# Patient Record
Sex: MALE | Race: WHITE | NOT HISPANIC OR LATINO | Employment: OTHER | URBAN - METROPOLITAN AREA
[De-identification: names, ages, dates, MRNs, and addresses within clinical notes are randomized per-mention and may not be internally consistent; named-entity substitution may affect disease eponyms.]

---

## 2017-12-19 ENCOUNTER — HOSPITAL ENCOUNTER (OUTPATIENT)
Dept: CARDIOLOGY | Facility: HOSPITAL | Age: 72
Discharge: HOME | End: 2017-12-19
Attending: INTERNAL MEDICINE | Admitting: INTERNAL MEDICINE

## 2019-02-12 ENCOUNTER — ON CAMPUS - OUTPATIENT (OUTPATIENT)
Dept: URBAN - METROPOLITAN AREA HOSPITAL 77 | Facility: HOSPITAL | Age: 74
End: 2019-02-12
Payer: MEDICARE

## 2019-02-12 DIAGNOSIS — R19.5 OTHER FECAL ABNORMALITIES: ICD-10-CM

## 2019-02-12 DIAGNOSIS — D12.5 BENIGN NEOPLASM OF SIGMOID COLON: ICD-10-CM

## 2019-02-12 DIAGNOSIS — D12.3 BENIGN NEOPLASM OF TRANSVERSE COLON: ICD-10-CM

## 2019-02-12 DIAGNOSIS — D12.4 BENIGN NEOPLASM OF DESCENDING COLON: ICD-10-CM

## 2019-02-12 DIAGNOSIS — K55.21 ANGIODYSPLASIA OF COLON WITH HEMORRHAGE: ICD-10-CM

## 2019-02-12 DIAGNOSIS — K62.1 RECTAL POLYP: ICD-10-CM

## 2019-02-12 PROCEDURE — 45382 COLONOSCOPY W/CONTROL BLEED: CPT | Performed by: INTERNAL MEDICINE

## 2019-02-12 PROCEDURE — 45385 COLONOSCOPY W/LESION REMOVAL: CPT | Mod: 59 | Performed by: INTERNAL MEDICINE

## 2019-06-13 ENCOUNTER — HOSPITAL ENCOUNTER (OUTPATIENT)
Dept: CARDIOLOGY | Facility: HOSPITAL | Age: 74
Discharge: HOME | End: 2019-06-13
Attending: NURSE PRACTITIONER | Admitting: NURSE PRACTITIONER

## 2019-06-26 ENCOUNTER — TRANSCRIBE ORDERS (OUTPATIENT)
Dept: CARDIOLOGY | Facility: CLINIC | Age: 74
End: 2019-06-26

## 2019-06-26 DIAGNOSIS — I73.9 PVD (PERIPHERAL VASCULAR DISEASE) (HCC): ICD-10-CM

## 2019-06-26 DIAGNOSIS — I35.0 AORTIC STENOSIS, MODERATE: ICD-10-CM

## 2019-06-26 DIAGNOSIS — I73.9 CLAUDICATION (HCC): Primary | ICD-10-CM

## 2019-06-26 DIAGNOSIS — R01.1 MURMUR: ICD-10-CM

## 2019-06-26 DIAGNOSIS — E78.5 HYPERLIPIDEMIA, UNSPECIFIED HYPERLIPIDEMIA TYPE: ICD-10-CM

## 2019-07-08 ENCOUNTER — HOSPITAL ENCOUNTER (OUTPATIENT)
Dept: CARDIOLOGY | Facility: HOSPITAL | Age: 74
Discharge: HOME | End: 2019-07-08
Admitting: NURSE PRACTITIONER

## 2019-07-08 VITALS
WEIGHT: 145 LBS | SYSTOLIC BLOOD PRESSURE: 154 MMHG | HEART RATE: 63 BPM | BODY MASS INDEX: 20.76 KG/M2 | DIASTOLIC BLOOD PRESSURE: 72 MMHG | HEIGHT: 70 IN

## 2019-07-08 DIAGNOSIS — I73.9 CLAUDICATION (HCC): ICD-10-CM

## 2019-07-08 DIAGNOSIS — I35.0 AORTIC STENOSIS, MODERATE: ICD-10-CM

## 2019-07-08 DIAGNOSIS — I73.9 PVD (PERIPHERAL VASCULAR DISEASE) (HCC): ICD-10-CM

## 2019-07-08 DIAGNOSIS — R01.1 MURMUR: ICD-10-CM

## 2019-07-08 DIAGNOSIS — E78.5 HYPERLIPIDEMIA, UNSPECIFIED HYPERLIPIDEMIA TYPE: ICD-10-CM

## 2019-07-08 LAB
BH CV ECHO MEAS - ACS: 0.7 CM
BH CV ECHO MEAS - AO MAX PG (FULL): 39.1 MMHG
BH CV ECHO MEAS - AO MAX PG: 41.1 MMHG
BH CV ECHO MEAS - AO MEAN PG (FULL): 22 MMHG
BH CV ECHO MEAS - AO MEAN PG: 23.1 MMHG
BH CV ECHO MEAS - AO ROOT AREA (BSA CORRECTED): 1.7
BH CV ECHO MEAS - AO ROOT AREA: 7.7 CM^2
BH CV ECHO MEAS - AO ROOT DIAM: 3.1 CM
BH CV ECHO MEAS - AO V2 MAX: 320.4 CM/SEC
BH CV ECHO MEAS - AO V2 MEAN: 227.7 CM/SEC
BH CV ECHO MEAS - AO V2 VTI: 76.6 CM
BH CV ECHO MEAS - ASC AORTA: 3.5 CM
BH CV ECHO MEAS - AVA(I,A): 0.88 CM^2
BH CV ECHO MEAS - AVA(I,D): 0.88 CM^2
BH CV ECHO MEAS - AVA(V,A): 0.92 CM^2
BH CV ECHO MEAS - AVA(V,D): 0.92 CM^2
BH CV ECHO MEAS - BSA(HAYCOCK): 1.8 M^2
BH CV ECHO MEAS - BSA: 1.8 M^2
BH CV ECHO MEAS - BZI_BMI: 20.4 KILOGRAMS/M^2
BH CV ECHO MEAS - BZI_METRIC_HEIGHT: 177.8 CM
BH CV ECHO MEAS - BZI_METRIC_WEIGHT: 64.4 KG
BH CV ECHO MEAS - EDV(CUBED): 123.4 ML
BH CV ECHO MEAS - EDV(MOD-SP4): 106.9 ML
BH CV ECHO MEAS - EDV(TEICH): 117.1 ML
BH CV ECHO MEAS - EF(CUBED): 60.5 %
BH CV ECHO MEAS - EF(MOD-SP4): 52.2 %
BH CV ECHO MEAS - EF(TEICH): 51.9 %
BH CV ECHO MEAS - ESV(CUBED): 48.7 ML
BH CV ECHO MEAS - ESV(MOD-SP4): 51.1 ML
BH CV ECHO MEAS - ESV(TEICH): 56.3 ML
BH CV ECHO MEAS - FS: 26.6 %
BH CV ECHO MEAS - IVS/LVPW: 1.6
BH CV ECHO MEAS - IVSD: 1.4 CM
BH CV ECHO MEAS - LA DIMENSION(2D): 3.9 CM
BH CV ECHO MEAS - LA DIMENSION: 3.9 CM
BH CV ECHO MEAS - LA/AO: 1.3
BH CV ECHO MEAS - LAT PEAK E' VEL: 7 CM/SEC
BH CV ECHO MEAS - LV DIASTOLIC VOL/BSA (35-75): 59.2 ML/M^2
BH CV ECHO MEAS - LV IVRT: 0.06 SEC
BH CV ECHO MEAS - LV MASS(C)D: 208.3 GRAMS
BH CV ECHO MEAS - LV MASS(C)DI: 115.4 GRAMS/M^2
BH CV ECHO MEAS - LV MAX PG: 2 MMHG
BH CV ECHO MEAS - LV MEAN PG: 1.1 MMHG
BH CV ECHO MEAS - LV SYSTOLIC VOL/BSA (12-30): 28.3 ML/M^2
BH CV ECHO MEAS - LV V1 MAX: 70.8 CM/SEC
BH CV ECHO MEAS - LV V1 MEAN: 49 CM/SEC
BH CV ECHO MEAS - LV V1 VTI: 16.2 CM
BH CV ECHO MEAS - LVIDD: 5 CM
BH CV ECHO MEAS - LVIDS: 3.7 CM
BH CV ECHO MEAS - LVOT AREA: 4.2 CM^2
BH CV ECHO MEAS - LVOT DIAM: 2.3 CM
BH CV ECHO MEAS - LVPWD: 0.87 CM
BH CV ECHO MEAS - MED PEAK E' VEL: 4 CM/SEC
BH CV ECHO MEAS - MV A MAX VEL: 114.5 CM/SEC
BH CV ECHO MEAS - MV DEC SLOPE: 676.9 CM/SEC^2
BH CV ECHO MEAS - MV DEC TIME: 0.17 SEC
BH CV ECHO MEAS - MV E MAX VEL: 115 CM/SEC
BH CV ECHO MEAS - MV E/A: 1
BH CV ECHO MEAS - MV MAX PG: 6.1 MMHG
BH CV ECHO MEAS - MV MEAN PG: 2.6 MMHG
BH CV ECHO MEAS - MV P1/2T: 58 MSEC
BH CV ECHO MEAS - MV V2 MAX: 123.7 CM/SEC
BH CV ECHO MEAS - MV V2 MEAN: 75.3 CM/SEC
BH CV ECHO MEAS - MV V2 VTI: 33.9 CM
BH CV ECHO MEAS - MVA(P1/2T): 3.8 CM2
BH CV ECHO MEAS - MVA(VTI): 2 CM^2
BH CV ECHO MEAS - PA ACC TIME: 0.15 SEC
BH CV ECHO MEAS - PA MAX PG (FULL): 0.54 MMHG
BH CV ECHO MEAS - PA MAX PG: 3.4 MMHG
BH CV ECHO MEAS - PA MEAN PG (FULL): 0.27 MMHG
BH CV ECHO MEAS - PA MEAN PG: 1.9 MMHG
BH CV ECHO MEAS - PA PR(ACCEL): 12.6 MMHG
BH CV ECHO MEAS - PA V2 MAX: 91.8 CM/SEC
BH CV ECHO MEAS - PA V2 MEAN: 64.6 CM/SEC
BH CV ECHO MEAS - PA V2 VTI: 20.6 CM
BH CV ECHO MEAS - PAPD(PI EDV): 16.5 MMHG
BH CV ECHO MEAS - PI END-D VEL: 145.8 CM/SEC
BH CV ECHO MEAS - PI MAX PG: 15 MMHG
BH CV ECHO MEAS - PI MAX VEL: 193.4 CM/SEC
BH CV ECHO MEAS - PULM A REVS DUR: 0.08 SEC
BH CV ECHO MEAS - PULM A REVS VEL: 30.5 CM/SEC
BH CV ECHO MEAS - PULM DIAS VEL: 53.8 CM/SEC
BH CV ECHO MEAS - PULM S/D: 1.1
BH CV ECHO MEAS - PULM SYS VEL: 59.8 CM/SEC
BH CV ECHO MEAS - RAP SYSTOLE: 8 MMHG
BH CV ECHO MEAS - RV MAX PG: 2.8 MMHG
BH CV ECHO MEAS - RV MEAN PG: 1.6 MMHG
BH CV ECHO MEAS - RV V1 MAX: 84.1 CM/SEC
BH CV ECHO MEAS - RV V1 MEAN: 60.1 CM/SEC
BH CV ECHO MEAS - RV V1 VTI: 19.4 CM
BH CV ECHO MEAS - RVSP: 49.8 MMHG
BH CV ECHO MEAS - SI(AO): 327.5 ML/M^2
BH CV ECHO MEAS - SI(CUBED): 41.4 ML/M^2
BH CV ECHO MEAS - SI(LVOT): 37.4 ML/M^2
BH CV ECHO MEAS - SI(MOD-SP4): 30.9 ML/M^2
BH CV ECHO MEAS - SI(TEICH): 33.6 ML/M^2
BH CV ECHO MEAS - SV(AO): 591 ML
BH CV ECHO MEAS - SV(CUBED): 74.7 ML
BH CV ECHO MEAS - SV(LVOT): 67.6 ML
BH CV ECHO MEAS - SV(MOD-SP4): 55.8 ML
BH CV ECHO MEAS - SV(TEICH): 60.7 ML
BH CV ECHO MEAS - TAPSE (>1.6): 2.9 CM2
BH CV ECHO MEAS - TR MAX VEL: 323.3 CM/SEC
BH CV ECHO MEASUREMENTS AVERAGE E/E' RATIO: 20.91
BH CV XLRA - RV BASE: 4 CM
BH CV XLRA - RV MID: 2.6 CM
BH CV XLRA - TDI S': 16 CM/SEC
IVRT: 55 MSEC
LEFT ATRIUM VOLUME INDEX: 42 ML/M2
LEFT ATRIUM VOLUME: 74 CM3
LV EF 2D ECHO EST: 65 %
MAXIMAL PREDICTED HEART RATE: 149 BPM
SINUS: 3 CM
STRESS TARGET HR: 127 BPM

## 2019-07-08 PROCEDURE — 93306 TTE W/DOPPLER COMPLETE: CPT

## 2019-07-08 PROCEDURE — 93306 TTE W/DOPPLER COMPLETE: CPT | Performed by: INTERNAL MEDICINE

## 2019-07-09 ENCOUNTER — OFFICE VISIT (OUTPATIENT)
Dept: CARDIOLOGY | Facility: CLINIC | Age: 74
End: 2019-07-09

## 2019-07-09 VITALS
OXYGEN SATURATION: 99 % | HEART RATE: 54 BPM | WEIGHT: 136 LBS | HEIGHT: 67 IN | BODY MASS INDEX: 21.35 KG/M2 | DIASTOLIC BLOOD PRESSURE: 74 MMHG | SYSTOLIC BLOOD PRESSURE: 134 MMHG

## 2019-07-09 DIAGNOSIS — R68.89 ABNORMAL ANKLE BRACHIAL INDEX (ABI): ICD-10-CM

## 2019-07-09 DIAGNOSIS — I35.0 AORTIC STENOSIS, MODERATE: Primary | ICD-10-CM

## 2019-07-09 DIAGNOSIS — I73.9 PVD (PERIPHERAL VASCULAR DISEASE) WITH CLAUDICATION (HCC): ICD-10-CM

## 2019-07-09 PROCEDURE — 99213 OFFICE O/P EST LOW 20 MIN: CPT | Performed by: NURSE PRACTITIONER

## 2019-07-09 RX ORDER — ELECTROLYTES/DEXTROSE
1 SOLUTION, ORAL ORAL DAILY
COMMUNITY

## 2019-07-09 RX ORDER — ASPIRIN 81 MG/1
81 TABLET ORAL
COMMUNITY
Start: 2017-03-08 | End: 2023-01-01

## 2019-07-09 RX ORDER — CLOPIDOGREL BISULFATE 75 MG/1
75 TABLET ORAL DAILY
COMMUNITY
Start: 2017-04-13

## 2019-07-09 RX ORDER — AMMONIUM LACTATE 12 G/100G
CREAM TOPICAL
COMMUNITY
End: 2023-01-01

## 2019-07-09 RX ORDER — ATORVASTATIN CALCIUM 40 MG/1
40 TABLET, FILM COATED ORAL NIGHTLY
COMMUNITY
Start: 2017-03-08

## 2019-07-09 RX ORDER — LISINOPRIL AND HYDROCHLOROTHIAZIDE 25; 20 MG/1; MG/1
1 TABLET ORAL DAILY
COMMUNITY
Start: 2017-03-08 | End: 2023-01-01

## 2019-07-09 NOTE — PROGRESS NOTES
Reason for follow-up: Aortic stenosis/claudication    Dear Dr Desmond NUNES had the pleasure of seeing Holger in followup for peripheral arterial disease.  This is a pleasant 73-year-old  male with no known history of ischemic.  he does have a history of peripheral vascular disease.  He is status post PTA of his right popliteal artery in March of 2017.   He presents today for follow-up on the above conditions.     Mr. Ibrahim presented initially with complaints of right lower extremity discomfort, described as a throbbing sensation that resolves with rest.  His last echo performed 12/2017 showed normal LV function, EF 55 to 60%, diastolic dysfunction and moderate to severe aortic stenosis with valve area 1.2 cm², peak/mean gradient 58/32.  He underwent further evaluation consisting of 2D echocardiogram and ABIs.  He presents today in follow-up for these diagnostics.     2D echocardiogram showed normal LV function, EF 65%.  There was mild aortic insufficiency and moderate to severe aortic stenosis- YG was recommended for further evaluation.  ABIs showed normal left, right digit moderate-severe occlusion.  Patient continues to have claudication, but denies any chest pains, pressure, palpitations or shortness of air.  He is an avid walker and has had no difficulty from a respiratory standpoint.  Results of tests were discussed with the patient in detail today.  Questions were answered.    Impressions  1.  claudication  2. Peripheral arterial disease status post PTA right popliteal artery 03/2017  3. Diabetes mellitus.  4. Hypertension  5. Reformed tobacco abuser.  6. Aortic stenosis  7.  Abnormal ABIs    Plan  We will send patient for further vascular evaluation for his claudication/peripheral vascular disease.  We will also schedule patient for YG to further evaluate aortic valve.  Continue low-dose aspirin daily.  Follow-up after testing.        Chief Complaint   Patient presents with   • Leg Pain      Followup test results            History of Present Illness     The following portions of the patient's history were reviewed and updated as appropriate: allergies, current medications, past family history, past medical history, past social history, past surgical history and problem list.    Review of Systems   Cardiovascular:        Claudication right lower extremity   All other systems reviewed and are negative.      Vitals:    07/09/19 1308   BP: 134/74   Pulse: 54   SpO2: 99%       Physical Exam   Constitutional: He is oriented to person, place, and time. He appears well-developed and well-nourished. No distress.   HENT:   Head: Normocephalic and atraumatic.   Eyes: Conjunctivae and EOM are normal. Pupils are equal, round, and reactive to light.   Neck: Neck supple. No JVD present.   No bruit   Cardiovascular: Normal rate and regular rhythm.   Murmur heard.  2/6 systolic murmur heard to the right sternal border   Pulmonary/Chest: Effort normal and breath sounds normal.   Musculoskeletal: Normal range of motion.   Ambulates freely without assistance   Neurological: He is alert and oriented to person, place, and time. No cranial nerve deficit.   Skin: Skin is warm and dry. No rash noted.   Psychiatric: He has a normal mood and affect. His behavior is normal. Thought content normal.       Past Medical History:   Diagnosis Date   • Alcoholism (CMS/[facility])     hx of   • DM2 (diabetes mellitus, type 2) (CMS/[facility])    • Hyperlipidemia    • Hypertension    • PVD (peripheral vascular disease) (CMS/[facility])     PTA 2017       Past Surgical History:   Procedure Laterality Date   • APPENDECTOMY     • EYE SURGERY Right    • HERNIA REPAIR     • SPLENECTOMY      partial spleen removal         Current Outpatient Medications:   •  aspirin (ASPIR-LOW) 81 MG EC tablet, Take 81 mg by mouth., Disp: , Rfl:   •  atorvastatin (LIPITOR) 40 MG tablet, Take 40 mg by mouth Daily., Disp: , Rfl:   •  clopidogrel (PLAVIX) 75 MG tablet, Take 75 mg by  "mouth Daily., Disp: , Rfl:   •  glucose blood (ONE TOUCH ULTRA TEST) test strip, TEST 2 TIMES DAILY AS INSTRUCTED, Disp: , Rfl:   •  Insulin Glargine (LANTUS SOLOSTAR) 100 UNIT/ML injection pen, Inject 30 Units under the skin into the appropriate area as directed Daily., Disp: , Rfl:   •  Insulin Pen Needle (BD ULTRA-FINE PEN NEEDLES) 29G X 12.7MM misc, ONE MISCELLANEOUS EVERY EVENING, Disp: , Rfl:   •  Insulin Syringe-Needle U-100 30G X 1/2\" 0.5 ML misc, USES TWICE A DAY AS DIRECTED. DX: 250.00, Disp: , Rfl:   •  lisinopril-hydrochlorothiazide (PRINZIDE,ZESTORETIC) 20-25 MG per tablet, Take 1 tablet by mouth Daily., Disp: , Rfl:   •  ammonium lactate (AMLACTIN) 12 % cream, by Intratympanic route., Disp: , Rfl:   •  Multiple Vitamins-Minerals (MULTIVITAMIN ADULT) tablet, Take 1 mg by mouth Daily., Disp: , Rfl:     Allergies   Allergen Reactions   • Contrast Dye Itching       Family History   Problem Relation Age of Onset   • Cancer Mother    • Hypertension Father    • Hypertension Brother        Social History     Tobacco Use   • Smoking status: Former Smoker     Types: Cigarettes   • Smokeless tobacco: Never Used   Substance Use Topics   • Alcohol use: No     Frequency: Never       Physical Exam   Constitutional: He is oriented to person, place, and time. He appears well-developed and well-nourished. No distress.   HENT:   Head: Normocephalic and atraumatic.   Eyes: Conjunctivae and EOM are normal. Pupils are equal, round, and reactive to light.   Neck: Neck supple. No JVD present.   No bruit   Cardiovascular: Normal rate and regular rhythm.   Murmur heard.  2/6 systolic murmur heard to the right sternal border   Pulmonary/Chest: Effort normal and breath sounds normal.   Musculoskeletal: Normal range of motion.   Ambulates freely without assistance   Neurological: He is alert and oriented to person, place, and time. No cranial nerve deficit.   Skin: Skin is warm and dry. No rash noted.   Psychiatric: He has a " normal mood and affect. His behavior is normal. Thought content normal.

## 2019-07-19 ENCOUNTER — APPOINTMENT (OUTPATIENT)
Dept: CARDIOLOGY | Facility: HOSPITAL | Age: 74
End: 2019-07-19

## 2019-07-30 ENCOUNTER — HOSPITAL ENCOUNTER (OUTPATIENT)
Dept: CARDIOLOGY | Facility: HOSPITAL | Age: 74
Discharge: HOME | End: 2019-07-30
Attending: INTERNAL MEDICINE | Admitting: INTERNAL MEDICINE

## 2019-07-30 ENCOUNTER — ANESTHESIA EVENT (OUTPATIENT)
Dept: CARDIOLOGY | Facility: HOSPITAL | Age: 74
End: 2019-07-30

## 2019-07-30 ENCOUNTER — ANESTHESIA (OUTPATIENT)
Dept: CARDIOLOGY | Facility: HOSPITAL | Age: 74
End: 2019-07-30

## 2019-07-30 VITALS
WEIGHT: 132.72 LBS | HEART RATE: 84 BPM | TEMPERATURE: 98.1 F | OXYGEN SATURATION: 99 % | DIASTOLIC BLOOD PRESSURE: 74 MMHG | BODY MASS INDEX: 19 KG/M2 | HEIGHT: 70 IN | RESPIRATION RATE: 16 BRPM | SYSTOLIC BLOOD PRESSURE: 163 MMHG

## 2019-07-30 VITALS — DIASTOLIC BLOOD PRESSURE: 56 MMHG | SYSTOLIC BLOOD PRESSURE: 123 MMHG | OXYGEN SATURATION: 100 %

## 2019-07-30 DIAGNOSIS — I35.0 AORTIC STENOSIS, MODERATE: ICD-10-CM

## 2019-07-30 PROCEDURE — 93312 ECHO TRANSESOPHAGEAL: CPT

## 2019-07-30 PROCEDURE — 93320 DOPPLER ECHO COMPLETE: CPT | Performed by: INTERNAL MEDICINE

## 2019-07-30 PROCEDURE — 93312 ECHO TRANSESOPHAGEAL: CPT | Performed by: INTERNAL MEDICINE

## 2019-07-30 PROCEDURE — 25010000002 PROPOFOL 10 MG/ML EMULSION: Performed by: ANESTHESIOLOGY

## 2019-07-30 PROCEDURE — 93325 DOPPLER ECHO COLOR FLOW MAPG: CPT

## 2019-07-30 PROCEDURE — 93320 DOPPLER ECHO COMPLETE: CPT

## 2019-07-30 PROCEDURE — 93325 DOPPLER ECHO COLOR FLOW MAPG: CPT | Performed by: INTERNAL MEDICINE

## 2019-07-30 RX ORDER — LIDOCAINE HYDROCHLORIDE 20 MG/ML
INJECTION, SOLUTION EPIDURAL; INFILTRATION; INTRACAUDAL; PERINEURAL AS NEEDED
Status: DISCONTINUED | OUTPATIENT
Start: 2019-07-30 | End: 2019-07-30 | Stop reason: SURG

## 2019-07-30 RX ORDER — EPHEDRINE SULFATE/0.9% NACL/PF 25 MG/5 ML
SYRINGE (ML) INTRAVENOUS AS NEEDED
Status: DISCONTINUED | OUTPATIENT
Start: 2019-07-30 | End: 2019-07-30 | Stop reason: SURG

## 2019-07-30 RX ORDER — SODIUM CHLORIDE 9 MG/ML
50 INJECTION, SOLUTION INTRAVENOUS CONTINUOUS
Status: DISCONTINUED | OUTPATIENT
Start: 2019-07-30 | End: 2019-08-12 | Stop reason: HOSPADM

## 2019-07-30 RX ORDER — PROPOFOL 10 MG/ML
VIAL (ML) INTRAVENOUS AS NEEDED
Status: DISCONTINUED | OUTPATIENT
Start: 2019-07-30 | End: 2019-07-30 | Stop reason: SURG

## 2019-07-30 RX ADMIN — Medication 15 MG: at 11:58

## 2019-07-30 RX ADMIN — PROPOFOL 470 MG: 10 INJECTION, EMULSION INTRAVENOUS at 11:32

## 2019-07-30 RX ADMIN — SODIUM CHLORIDE 50 ML/HR: 900 INJECTION, SOLUTION INTRAVENOUS at 10:17

## 2019-07-30 RX ADMIN — LIDOCAINE HYDROCHLORIDE 40 MG: 20 INJECTION, SOLUTION EPIDURAL; INFILTRATION; INTRACAUDAL; PERINEURAL at 11:32

## 2019-07-30 NOTE — ANESTHESIA POSTPROCEDURE EVALUATION
Patient: Holger Ibrahim    Procedure Summary     Date:  07/30/19 Room / Location:  [facility]    Anesthesia Start:  1131 Anesthesia Stop:  1211    Procedure:  ADULT TRANSESOPHAGEAL ECHO (YG) W/ CONT IF NECESSARY PER PROTOCOL Diagnosis:       Aortic stenosis, moderate      (Valvular Disease)      (Aortic Valve Assessment)    Scheduled Providers:  Dalton Thomas DO Provider:  Sven Awad MD    Anesthesia Type:  MAC ASA Status:  3          Anesthesia Type: MAC  Last vitals  BP   151/77 (07/30/19 1245)   Temp   98.1 °F (36.7 °C) (07/30/19 0945)   Pulse   87 (07/30/19 1300)   Resp   16 (07/30/19 0945)     SpO2   100 % (07/30/19 1300)     Post Anesthesia Care and Evaluation    Patient location during evaluation: PACU  Patient participation: complete - patient participated  Level of consciousness: awake  Pain scale: See nurse's notes for pain score.  Pain management: adequate  Airway patency: patent  Anesthetic complications: No anesthetic complications  PONV Status: none  Cardiovascular status: acceptable  Respiratory status: acceptable  Hydration status: acceptable    Comments: Patient seen and examined postoperatively; vital signs stable; SpO2 greater than or equal to 90%; cardiopulmonary status stable; nausea/vomiting adequately controlled; pain adequately controlled; no apparent anesthesia complications; patient discharged from anesthesia care when discharge criteria were met

## 2019-07-30 NOTE — ANESTHESIA PREPROCEDURE EVALUATION
Anesthesia Evaluation     Patient summary reviewed and Nursing notes reviewed   no history of anesthetic complications:  NPO Solid Status: > 8 hours  NPO Liquid Status: > 8 hours           Airway   Mallampati: II  TM distance: >3 FB  Neck ROM: full  No difficulty expected  Dental - normal exam     Pulmonary - normal exam   (+) a smoker Current Abstained day of surgery,   Cardiovascular - normal exam    PT is on anticoagulation therapy    (+) hypertension, valvular problems/murmurs AS, PVD, hyperlipidemia,       Neuro/Psych  (+) psychiatric history,     GI/Hepatic/Renal/Endo    (+)   diabetes mellitus,     Musculoskeletal     Abdominal    Substance History   (+) alcohol use,      OB/GYN          Other                        Anesthesia Plan    ASA 3     MAC   (Patient identified; pre-operative vital signs, all relevant labs/studies, complete medical/surgical/anesthetic history, full medication list, full allergy list, and NPO status obtained/reviewed; physical assessment performed; anesthetic options, side effects, potential complications, risks, and benefits discussed; questions answered; written anesthesia consent obtained; patient cleared for procedure; anesthesia machine and equipment checked and functioning)  Anesthetic plan, all risks, benefits, and alternatives have been provided, discussed and informed consent has been obtained with: patient.

## 2019-07-31 LAB
BH CV ECHO MEAS - AO MAX PG: 40.5 MMHG
BH CV ECHO MEAS - AO MEAN PG: 22.3 MMHG
BH CV ECHO MEAS - AO V2 MAX: 318.2 CM/SEC
BH CV ECHO MEAS - AO V2 MEAN: 210.7 CM/SEC
BH CV ECHO MEAS - AO V2 VTI: 56.4 CM
BH CV ECHO MEAS - BSA(HAYCOCK): 1.7 M^2
BH CV ECHO MEAS - BSA: 1.7 M^2
BH CV ECHO MEAS - BZI_BMI: 18.9 KILOGRAMS/M^2
BH CV ECHO MEAS - BZI_METRIC_HEIGHT: 177.8 CM
BH CV ECHO MEAS - BZI_METRIC_WEIGHT: 59.9 KG

## 2019-08-29 ENCOUNTER — OFFICE VISIT (OUTPATIENT)
Dept: CARDIOLOGY | Facility: CLINIC | Age: 74
End: 2019-08-29

## 2019-08-29 VITALS
DIASTOLIC BLOOD PRESSURE: 72 MMHG | HEIGHT: 70 IN | HEART RATE: 52 BPM | WEIGHT: 131 LBS | OXYGEN SATURATION: 99 % | BODY MASS INDEX: 18.75 KG/M2 | SYSTOLIC BLOOD PRESSURE: 147 MMHG

## 2019-08-29 DIAGNOSIS — I73.9 PVD (PERIPHERAL VASCULAR DISEASE) WITH CLAUDICATION (HCC): ICD-10-CM

## 2019-08-29 DIAGNOSIS — I51.89 DIASTOLIC DYSFUNCTION: ICD-10-CM

## 2019-08-29 DIAGNOSIS — R68.89 ABNORMAL ANKLE BRACHIAL INDEX (ABI): ICD-10-CM

## 2019-08-29 DIAGNOSIS — I35.0 AORTIC STENOSIS, MODERATE: Primary | ICD-10-CM

## 2019-08-29 PROCEDURE — 99213 OFFICE O/P EST LOW 20 MIN: CPT | Performed by: NURSE PRACTITIONER

## 2019-08-29 NOTE — PROGRESS NOTES
[facility] CARDIOLOGY      REASON FOR FOLLOW-UP:  Aortic stenosis  Peripheral arterial disease  Diastolic dysfunction          Chief Complaint   Patient presents with   • Peripheral Vascular Disease     Recent JYOTHI at hospital/PTA of popliteal artery 3/2017   • Cardiac Valve Problem     Echo-EF 65%, mild md HERMINIOerate to severe AS         Dear Dr. Priest        History of Present Illness   I had the pleasure of seeing Holger in the office today.  This is a pleasant 73-year-old  male with no known history of ischemic heart disease.  He does have a history of peripheral vascular disease, aortic valve stenosis, diastolic dysfunction, peripheral arterial disease status post PTA of his right popliteal artery in March of 2017.   He presents today for follow-up on YG    Mr. Ibrhaim presented initially with complaints of right lower extremity discomfort, described as a throbbing sensation that resolves with rest.  His last echo performed 12/2017 showed normal LV function, EF 55 to 60%, diastolic dysfunction and moderate to severe aortic stenosis with valve area 1.2 cm², peak/mean gradient 58/32. ABIs showed normal left, right digit moderate-severe occlusion. He underwent further evaluation consisting of 2D echocardiogram.  Due to abnormal aortic valve findings, YG was recommended.  This was performed 7/9/2019.      YG 7/9/19:  · Left ventricular systolic function is normal.  · Estimated EF appears to be in the range of 56 - 60%.  · There is moderate calcification of the aortic valve mainly affecting the left and right coronary cusp(s).  · A bicuspid valve is suggested with fused left-sided cusps.  · Moderate aortic valve stenosis is present.  · Peak/mean aortic valve gradients are 41/22 mmHg respectively.  · Planimeter aortic valve area is 1.5 cm².  · Mild aortic valve regurgitation is present.  · Mild tricuspid valve regurgitation is present.    He presents today in follow-up from this  procedure.  Results were discussed with the patient in detail.  Questions were answered.  Patient denies any chest pains, pressure, tightness.  He denies any palpitations or shortness of breath.  His only complaint continues to be pain in his right leg and difficulty walking very far.  Vascular surgery consult was placed during his last office visit.  We will follow-up on this for him.         Assessment:  1.  claudication  2. Peripheral arterial disease status post PTA right popliteal artery 03/2017  3. Diabetes mellitus.  4. Hypertension  5. Reformed tobacco abuser.  6. Aortic stenosis  7.  Abnormal ABIs       Plan:  Recommend yearly echo  Patient to call for appointment with vascular  Follow-up in 6 months or sooner if needed        The following portions of the patient's history were reviewed and updated as appropriate: allergies, current medications, past family history, past medical history, past social history, past surgical history and problem list.    REVIEW OF SYSTEMS:    Review of Systems   Cardiovascular:        Claudication right lower extremity   All other systems reviewed and are negative.      Vitals:    08/29/19 1452   BP: 147/72   Pulse: 52   SpO2: 99%         PHYSICAL EXAM:    General: This is a well-developed, thin appearing 73-year-old -American gentleman who is alert, cooperative, no distress, appears stated age  Head:  Normocephalic, atraumatic, mucous membranes moist  Eyes:  Conjunctiva/corneas clear, EOM's intact     Neck:  Supple,  no adenopathy;      Lungs: Clear to auscultation bilaterally, no wheezes rhonchi rales are noted  Chest wall: No tenderness  Musculoskeletal:   Ambulates freely without assistance  Heart::  Regular rate and rhythm, S1 and S2 normal, soft systolic murmur.  Abdomen: Soft, non-tender, nondistended bowel sounds active  Extremities: No cyanosis, clubbing, or edema   Pulses: 2+ and symmetric all extremities  Skin:  No rashes or lesions  Neuro/psych: A&O x3. CN  "II through XII are grossly intact with appropriate affect        Past Medical History:   Diagnosis Date   • Alcoholism (CMS/[facility])     hx of   • DM2 (diabetes mellitus, type 2) (CMS/[facility])    • Hyperlipidemia    • Hypertension    • PVD (peripheral vascular disease) (CMS/[facility])     PTA 2017       Past Surgical History:   Procedure Laterality Date   • APPENDECTOMY     • EYE SURGERY Right    • HERNIA REPAIR     • POPLITEAL ARTERY ANGIOPLASTY Right 2017   • SPLENECTOMY      partial spleen removal         Current Outpatient Medications:   •  ammonium lactate (AMLACTIN) 12 % cream, by Intratympanic route., Disp: , Rfl:   •  aspirin (ASPIR-LOW) 81 MG EC tablet, Take 81 mg by mouth., Disp: , Rfl:   •  atorvastatin (LIPITOR) 40 MG tablet, Take 40 mg by mouth Daily., Disp: , Rfl:   •  clopidogrel (PLAVIX) 75 MG tablet, Take 75 mg by mouth Daily., Disp: , Rfl:   •  glucose blood (ONE TOUCH ULTRA TEST) test strip, TEST 2 TIMES DAILY AS INSTRUCTED, Disp: , Rfl:   •  Insulin Glargine (LANTUS SOLOSTAR) 100 UNIT/ML injection pen, Inject 30 Units under the skin into the appropriate area as directed Every Night., Disp: , Rfl:   •  Insulin Pen Needle (BD ULTRA-FINE PEN NEEDLES) 29G X 12.7MM misc, ONE MISCELLANEOUS EVERY EVENING, Disp: , Rfl:   •  Insulin Syringe-Needle U-100 30G X 1/2\" 0.5 ML misc, USES TWICE A DAY AS DIRECTED. DX: 250.00, Disp: , Rfl:   •  lisinopril-hydrochlorothiazide (PRINZIDE,ZESTORETIC) 20-25 MG per tablet, Take 1 tablet by mouth Daily., Disp: , Rfl:   •  Multiple Vitamins-Minerals (MULTIVITAMIN ADULT) tablet, Take 1 mg by mouth Daily., Disp: , Rfl:     Allergies   Allergen Reactions   • Contrast Dye Itching       Family History   Problem Relation Age of Onset   • Cancer Mother    • Hypertension Father    • Hypertension Brother        Social History     Tobacco Use   • Smoking status: Current Some Day Smoker     Packs/day: 0.25     Types: Cigarettes   • Smokeless tobacco: Never Used   Substance Use Topics   • Alcohol " use: No     Frequency: Never           Current Electrocardiogram:  Procedures      JAH Tipton  08/29/19  3:16 PM

## 2019-09-05 DIAGNOSIS — I73.9 PVD (PERIPHERAL VASCULAR DISEASE) WITH CLAUDICATION (HCC): Primary | ICD-10-CM

## 2019-10-01 ENCOUNTER — APPOINTMENT (OUTPATIENT)
Dept: VASCULAR SURGERY | Facility: HOSPITAL | Age: 74
End: 2019-10-01

## 2019-10-01 ENCOUNTER — TRANSCRIBE ORDERS (OUTPATIENT)
Dept: ADMINISTRATIVE | Facility: HOSPITAL | Age: 74
End: 2019-10-01

## 2019-10-01 DIAGNOSIS — R09.89 BILATERAL CAROTID BRUITS: ICD-10-CM

## 2019-10-01 DIAGNOSIS — I73.9 CLAUDICATION (HCC): Primary | ICD-10-CM

## 2019-10-01 PROCEDURE — G0463 HOSPITAL OUTPT CLINIC VISIT: HCPCS | Performed by: SURGERY

## 2019-10-03 ENCOUNTER — HOSPITAL ENCOUNTER (OUTPATIENT)
Dept: CARDIOLOGY | Facility: HOSPITAL | Age: 74
Discharge: HOME | End: 2019-10-03

## 2019-10-03 ENCOUNTER — HOSPITAL ENCOUNTER (OUTPATIENT)
Dept: CARDIOLOGY | Facility: HOSPITAL | Age: 74
Discharge: HOME | End: 2019-10-03
Admitting: SURGERY

## 2019-10-03 DIAGNOSIS — I73.9 CLAUDICATION (HCC): ICD-10-CM

## 2019-10-03 DIAGNOSIS — R09.89 BILATERAL CAROTID BRUITS: ICD-10-CM

## 2019-10-03 DIAGNOSIS — I73.9 PVD (PERIPHERAL VASCULAR DISEASE) (HCC): ICD-10-CM

## 2019-10-03 LAB
BH CV LEA RIGHT ANT TIBIAL A PROX PSV: 54 CM/S
BH CV LEA RIGHT CFA PROX PSV: 151 CM/S
BH CV LEA RIGHT DFA PROX PSV: 113 CM/S
BH CV LEA RIGHT PERONEAL  MID PSV: 47 CM/S
BH CV LEA RIGHT POPITEAL A  DISTAL PSV: 31 CM/S
BH CV LEA RIGHT POPITEAL A  MID PSV: 136 CM/S
BH CV LEA RIGHT POPITEAL A  PROX PSV: 619 CM/S
BH CV LEA RIGHT PTA MID PSV: 0 CM/S
BH CV LEA RIGHT SFA DISTAL PSV: 62 CM/S
BH CV LEA RIGHT SFA MID PSV: 74 CM/S
BH CV LEA RIGHT SFA PROX PSV: 79 CM/S
BH CV LOWER ARTERIAL LEFT DORSALIS PEDIS SYS MAX: 255 MMHG
BH CV LOWER ARTERIAL LEFT GREAT TOE SYS MAX: 81 MMHG
BH CV LOWER ARTERIAL LEFT POST TIBIAL SYS MAX: 255 MMHG
BH CV LOWER ARTERIAL RIGHT DORSALIS PEDIS SYS MAX: 255 MMHG
BH CV LOWER ARTERIAL RIGHT GREAT TOE SYS MAX: 20 MMHG
BH CV LOWER ARTERIAL RIGHT POST TIBIAL SYS MAX: 25 MMHG
BH CV XLRA MEAS LEFT BULB EDV: -106 CM/SEC
BH CV XLRA MEAS LEFT BULB PSV: -355 CM/SEC
BH CV XLRA MEAS LEFT DIST CCA EDV: -54.2 CM/SEC
BH CV XLRA MEAS LEFT DIST CCA PSV: -243 CM/SEC
BH CV XLRA MEAS LEFT DIST ICA EDV: -33.8 CM/SEC
BH CV XLRA MEAS LEFT DIST ICA PSV: -91 CM/SEC
BH CV XLRA MEAS LEFT ICA/CCA RATIO: 1.4
BH CV XLRA MEAS LEFT MID CCA EDV: 42.6 CM/SEC
BH CV XLRA MEAS LEFT MID CCA PSV: 161 CM/SEC
BH CV XLRA MEAS LEFT PROX CCA EDV: 12.9 CM/SEC
BH CV XLRA MEAS LEFT PROX CCA PSV: 63.1 CM/SEC
BH CV XLRA MEAS LEFT PROX ECA PSV: -139 CM/SEC
BH CV XLRA MEAS LEFT PROX ICA EDV: 106 CM/SEC
BH CV XLRA MEAS LEFT PROX ICA PSV: 335 CM/SEC
BH CV XLRA MEAS LEFT PROX SCLA PSV: 178 CM/SEC
BH CV XLRA MEAS LEFT VERTEBRAL A PSV: -93.2 CM/SEC
BH CV XLRA MEAS RIGHT CCA RATIO VEL: 82.6 CM/SEC
BH CV XLRA MEAS RIGHT DIST CCA EDV: 19.3 CM/SEC
BH CV XLRA MEAS RIGHT DIST CCA PSV: 82.6 CM/SEC
BH CV XLRA MEAS RIGHT DIST ICA EDV: -16.7 CM/SEC
BH CV XLRA MEAS RIGHT DIST ICA PSV: -73.8 CM/SEC
BH CV XLRA MEAS RIGHT ICA RATIO VEL: -145 CM/SEC
BH CV XLRA MEAS RIGHT ICA/CCA RATIO: -1.8
BH CV XLRA MEAS RIGHT PROX CCA EDV: 14.3 CM/SEC
BH CV XLRA MEAS RIGHT PROX CCA PSV: 82.6 CM/SEC
BH CV XLRA MEAS RIGHT PROX ECA PSV: -93.8 CM/SEC
BH CV XLRA MEAS RIGHT PROX ICA EDV: -30.8 CM/SEC
BH CV XLRA MEAS RIGHT PROX ICA PSV: -145 CM/SEC
BH CV XLRA MEAS RIGHT PROX SCLA PSV: 123 CM/SEC
UPPER ARTERIAL LEFT ARM BRACHIAL SYS MAX: 131 MMHG
UPPER ARTERIAL RIGHT ARM BRACHIAL SYS MAX: 137 MMHG

## 2019-10-03 PROCEDURE — 93926 LOWER EXTREMITY STUDY: CPT

## 2019-10-03 PROCEDURE — 93922 UPR/L XTREMITY ART 2 LEVELS: CPT

## 2019-10-03 PROCEDURE — 93880 EXTRACRANIAL BILAT STUDY: CPT

## 2019-10-08 ENCOUNTER — APPOINTMENT (OUTPATIENT)
Dept: VASCULAR SURGERY | Facility: HOSPITAL | Age: 74
End: 2019-10-08

## 2019-10-08 PROCEDURE — G0463 HOSPITAL OUTPT CLINIC VISIT: HCPCS | Performed by: SURGERY

## 2019-11-04 RX ORDER — DIPHENHYDRAMINE HCL 50 MG
50 CAPSULE ORAL ONCE
COMMUNITY
End: 2023-01-01

## 2019-11-04 RX ORDER — PREDNISONE 50 MG/1
50 TABLET ORAL ONCE
COMMUNITY
End: 2023-01-01

## 2019-11-05 ENCOUNTER — HOSPITAL ENCOUNTER (OUTPATIENT)
Facility: HOSPITAL | Age: 74
Setting detail: HOSPITAL OUTPATIENT SURGERY
Discharge: HOME | End: 2019-11-05
Attending: SURGERY | Admitting: SURGERY

## 2019-11-05 VITALS
HEART RATE: 101 BPM | TEMPERATURE: 98.1 F | SYSTOLIC BLOOD PRESSURE: 123 MMHG | DIASTOLIC BLOOD PRESSURE: 55 MMHG | RESPIRATION RATE: 14 BRPM | BODY MASS INDEX: 19 KG/M2 | OXYGEN SATURATION: 100 % | WEIGHT: 132.72 LBS | HEIGHT: 70 IN

## 2019-11-05 DIAGNOSIS — I73.9 PAD (PERIPHERAL ARTERY DISEASE) (HCC): ICD-10-CM

## 2019-11-05 LAB
ANION GAP SERPL CALCULATED.3IONS-SCNC: 11 MMOL/L (ref 5–15)
BASOPHILS # BLD AUTO: 0 10*3/MM3 (ref 0–0.2)
BASOPHILS NFR BLD AUTO: 0.1 % (ref 0–1.5)
BUN BLD-MCNC: 28 MG/DL (ref 8–23)
BUN/CREAT SERPL: 19.9 (ref 7–25)
CALCIUM SPEC-SCNC: 9.4 MG/DL (ref 8.6–10.5)
CHLORIDE SERPL-SCNC: 110 MMOL/L (ref 98–107)
CO2 SERPL-SCNC: 24 MMOL/L (ref 22–29)
CREAT BLD-MCNC: 1.41 MG/DL (ref 0.76–1.27)
DEPRECATED RDW RBC AUTO: 47.7 FL (ref 37–54)
EOSINOPHIL # BLD AUTO: 0 10*3/MM3 (ref 0–0.4)
EOSINOPHIL NFR BLD AUTO: 0 % (ref 0.3–6.2)
ERYTHROCYTE [DISTWIDTH] IN BLOOD BY AUTOMATED COUNT: 14.5 % (ref 12.3–15.4)
GFR SERPL CREATININE-BSD FRML MDRD: 49 ML/MIN/1.73
GLUCOSE BLD-MCNC: 156 MG/DL (ref 65–99)
GLUCOSE BLDC GLUCOMTR-MCNC: 144 MG/DL (ref 70–105)
HCT VFR BLD AUTO: 33.6 % (ref 37.5–51)
HGB BLD-MCNC: 10.9 G/DL (ref 13–17.7)
INR PPP: 1.09 (ref 0.9–1.1)
LYMPHOCYTES # BLD AUTO: 0.4 10*3/MM3 (ref 0.7–3.1)
LYMPHOCYTES NFR BLD AUTO: 6.3 % (ref 19.6–45.3)
MCH RBC QN AUTO: 29.8 PG (ref 26.6–33)
MCHC RBC AUTO-ENTMCNC: 32.5 G/DL (ref 31.5–35.7)
MCV RBC AUTO: 91.8 FL (ref 79–97)
MONOCYTES # BLD AUTO: 0 10*3/MM3 (ref 0.1–0.9)
MONOCYTES NFR BLD AUTO: 0.7 % (ref 5–12)
NEUTROPHILS # BLD AUTO: 5.6 10*3/MM3 (ref 1.7–7)
NEUTROPHILS NFR BLD AUTO: 92.9 % (ref 42.7–76)
NRBC BLD AUTO-RTO: 0 /100 WBC (ref 0–0.2)
PLATELET # BLD AUTO: 229 10*3/MM3 (ref 140–450)
PMV BLD AUTO: 10 FL (ref 6–12)
POTASSIUM BLD-SCNC: 4.4 MMOL/L (ref 3.5–5.2)
PROTHROMBIN TIME: 11.2 SECONDS (ref 9.6–11.7)
RBC # BLD AUTO: 3.66 10*6/MM3 (ref 4.14–5.8)
SODIUM BLD-SCNC: 145 MMOL/L (ref 136–145)
WBC NRBC COR # BLD: 6 10*3/MM3 (ref 3.4–10.8)

## 2019-11-05 PROCEDURE — 85025 COMPLETE CBC W/AUTO DIFF WBC: CPT | Performed by: SURGERY

## 2019-11-05 PROCEDURE — 82962 GLUCOSE BLOOD TEST: CPT

## 2019-11-05 PROCEDURE — C1769 GUIDE WIRE: HCPCS | Performed by: SURGERY

## 2019-11-05 PROCEDURE — C1760 CLOSURE DEV, VASC: HCPCS | Performed by: SURGERY

## 2019-11-05 PROCEDURE — 85610 PROTHROMBIN TIME: CPT | Performed by: SURGERY

## 2019-11-05 PROCEDURE — 75710 ARTERY X-RAYS ARM/LEG: CPT | Performed by: SURGERY

## 2019-11-05 PROCEDURE — 0 IOPAMIDOL PER 1 ML: Performed by: SURGERY

## 2019-11-05 PROCEDURE — C1894 INTRO/SHEATH, NON-LASER: HCPCS | Performed by: SURGERY

## 2019-11-05 PROCEDURE — 25010000002 HYDRALAZINE PER 20 MG: Performed by: SURGERY

## 2019-11-05 PROCEDURE — C1725 CATH, TRANSLUMIN NON-LASER: HCPCS | Performed by: SURGERY

## 2019-11-05 PROCEDURE — 25010000002 HEPARIN (PORCINE) PER 1000 UNITS: Performed by: SURGERY

## 2019-11-05 PROCEDURE — 80048 BASIC METABOLIC PNL TOTAL CA: CPT | Performed by: SURGERY

## 2019-11-05 PROCEDURE — C1887 CATHETER, GUIDING: HCPCS | Performed by: SURGERY

## 2019-11-05 RX ORDER — SODIUM CHLORIDE 0.9 % (FLUSH) 0.9 %
10 SYRINGE (ML) INJECTION AS NEEDED
Status: DISCONTINUED | OUTPATIENT
Start: 2019-11-05 | End: 2019-11-05 | Stop reason: HOSPADM

## 2019-11-05 RX ORDER — LIDOCAINE HYDROCHLORIDE 20 MG/ML
INJECTION, SOLUTION INFILTRATION; PERINEURAL AS NEEDED
Status: DISCONTINUED | OUTPATIENT
Start: 2019-11-05 | End: 2019-11-05 | Stop reason: HOSPADM

## 2019-11-05 RX ORDER — AMLODIPINE BESYLATE 5 MG/1
5 TABLET ORAL DAILY
COMMUNITY
End: 2023-01-01

## 2019-11-05 RX ORDER — HYDRALAZINE HYDROCHLORIDE 20 MG/ML
INJECTION INTRAMUSCULAR; INTRAVENOUS AS NEEDED
Status: DISCONTINUED | OUTPATIENT
Start: 2019-11-05 | End: 2019-11-05 | Stop reason: HOSPADM

## 2019-11-05 RX ORDER — SODIUM CHLORIDE 0.9 % (FLUSH) 0.9 %
3 SYRINGE (ML) INJECTION EVERY 12 HOURS SCHEDULED
Status: DISCONTINUED | OUTPATIENT
Start: 2019-11-05 | End: 2019-11-05 | Stop reason: HOSPADM

## 2019-11-05 RX ORDER — HEPARIN SODIUM 1000 [USP'U]/ML
INJECTION, SOLUTION INTRAVENOUS; SUBCUTANEOUS AS NEEDED
Status: DISCONTINUED | OUTPATIENT
Start: 2019-11-05 | End: 2019-11-05 | Stop reason: HOSPADM

## 2019-11-05 RX ORDER — SODIUM CHLORIDE 9 MG/ML
100 INJECTION, SOLUTION INTRAVENOUS CONTINUOUS
Status: DISCONTINUED | OUTPATIENT
Start: 2019-11-05 | End: 2019-11-05 | Stop reason: HOSPADM

## 2019-11-05 RX ADMIN — SODIUM CHLORIDE 100 ML/HR: 0.9 INJECTION, SOLUTION INTRAVENOUS at 16:09

## 2019-11-05 NOTE — H&P
Name: Holger Ibrahim ADMIT: 2019   : 1945  PCP: Vikram Priest MD    MRN: [id number] LOS: 0 days   AGE/SEX: 74 y.o. male  ROOM: [address] [place] [address]         CHIEF COMPLAINT: severe right leg claudication   HPI: Holger Ibrahim is a 74 y.o. male whose previous medical records I have reviewed and summarize below in addition to the findings from today's exam. Severe lifestyle limiting claudication despite best medical management.     PAST MEDICAL HISTORY:   Past Medical History:   Diagnosis Date   • Alcoholism (CMS/[facility])     hx of   • DM2 (diabetes mellitus, type 2) (CMS/[facility])    • Hyperlipidemia    • Hypertension    • PVD (peripheral vascular disease) (CMS/[facility])     2017      PAST SURGICAL HISTORY:   Past Surgical History:   Procedure Laterality Date   • APPENDECTOMY     • EYE SURGERY Right    • HERNIA REPAIR     • POPLITEAL ARTERY ANGIOPLASTY Right 2017   • SPLENECTOMY      partial spleen removal      FAMILY HISTORY:   Family History   Problem Relation Age of Onset   • Cancer Mother    • Hypertension Father    • Hypertension Brother       SOCIAL HISTORY:   Social History     Tobacco Use   • Smoking status: Current Some Day Smoker     Packs/day: 0.25     Types: Cigarettes   • Smokeless tobacco: Never Used   Substance Use Topics   • Alcohol use: No     Frequency: Never   • Drug use: No      MEDICATIONS:   No current facility-administered medications on file prior to encounter.      Current Outpatient Medications on File Prior to Encounter   Medication Sig Dispense Refill   • amLODIPine (NORVASC) 5 MG tablet Take 5 mg by mouth Daily.     • ammonium lactate (AMLACTIN) 12 % cream Apply  topically to the appropriate area as directed.     • aspirin (ASPIR-LOW) 81 MG EC tablet Take 81 mg by mouth.     • atorvastatin (LIPITOR) 40 MG tablet Take 40 mg by mouth Daily.     • clopidogrel (PLAVIX) 75 MG tablet Take 75 mg by mouth Daily.     • diphenhydrAMINE (BENADRYL) 50 MG capsule Take 50 mg by mouth 1  "(One) Time.     • glucose blood (ONE TOUCH ULTRA TEST) test strip TEST 2 TIMES DAILY AS INSTRUCTED     • Insulin Glargine (LANTUS SOLOSTAR) 100 UNIT/ML injection pen Inject 32 Units under the skin into the appropriate area as directed Every Night.     • Insulin Pen Needle (BD ULTRA-FINE PEN NEEDLES) 29G X 12.7MM misc ONE MISCELLANEOUS EVERY EVENING     • Insulin Syringe-Needle U-100 30G X 1/2\" 0.5 ML misc USES TWICE A DAY AS DIRECTED.  DX: 250.00     • lisinopril-hydrochlorothiazide (PRINZIDE,ZESTORETIC) 20-25 MG per tablet Take 1 tablet by mouth Daily.     • Multiple Vitamins-Minerals (MULTIVITAMIN ADULT) tablet Take 1 mg by mouth Daily.     • predniSONE (DELTASONE) 50 MG tablet Take 50 mg by mouth 1 (One) Time.               ALLERGIES: Contrast dye     COMPLETE REVIEW OF SYSTEMS:     Review of Systems   Constitutional: Positive for activity change and fatigue.   Respiratory: Negative for shortness of breath.    Cardiovascular: Negative for chest pain.   Gastrointestinal: Negative for abdominal pain.         PHYSICAL EXAM:   Patient Vitals for the past 24 hrs:   BP Temp Temp src Pulse Resp SpO2 Height Weight   11/05/19 1140 159/80 98.1 °F (36.7 °C) Oral 77 14 99 % 177.8 cm (70\") 60.2 kg (132 lb 11.5 oz)        Physical Exam   Constitutional: He appears well-developed. No distress.   HENT:   Head: Normocephalic and atraumatic.   Cardiovascular: Normal rate and regular rhythm.   Pulmonary/Chest: Effort normal. No respiratory distress.   Abdominal: Soft. There is no tenderness.   Neurological: He is alert.   Skin: Skin is warm and dry.             LABS:      Recent Results (from the past 24 hour(s))   POC Glucose Once    Collection Time: 11/05/19 10:43 AM   Result Value Ref Range    Glucose 144 (H) 70 - 105 mg/dL   Basic Metabolic Panel    Collection Time: 11/05/19 11:27 AM   Result Value Ref Range    Glucose 156 (H) 65 - 99 mg/dL    BUN 28 (H) 8 - 23 mg/dL    Creatinine 1.41 (H) 0.76 - 1.27 mg/dL    Sodium 145 136 - " 145 mmol/L    Potassium 4.4 3.5 - 5.2 mmol/L    Chloride 110 (H) 98 - 107 mmol/L    CO2 24.0 22.0 - 29.0 mmol/L    Calcium 9.4 8.6 - 10.5 mg/dL    eGFR Non African Amer 49 (L) >60 mL/min/1.73    BUN/Creatinine Ratio 19.9 7.0 - 25.0    Anion Gap 11.0 5.0 - 15.0 mmol/L   Protime-INR    Collection Time: 11/05/19 11:27 AM   Result Value Ref Range    Protime 11.2 9.6 - 11.7 Seconds    INR 1.09 0.90 - 1.10   CBC Auto Differential    Collection Time: 11/05/19 11:27 AM   Result Value Ref Range    WBC 6.00 3.40 - 10.80 10*3/mm3    RBC 3.66 (L) 4.14 - 5.80 10*6/mm3    Hemoglobin 10.9 (L) 13.0 - 17.7 g/dL    Hematocrit 33.6 (L) 37.5 - 51.0 %    MCV 91.8 79.0 - 97.0 fL    MCH 29.8 26.6 - 33.0 pg    MCHC 32.5 31.5 - 35.7 g/dL    RDW 14.5 12.3 - 15.4 %    RDW-SD 47.7 37.0 - 54.0 fl    MPV 10.0 6.0 - 12.0 fL    Platelets 229 140 - 450 10*3/mm3    Neutrophil % 92.9 (H) 42.7 - 76.0 %    Lymphocyte % 6.3 (L) 19.6 - 45.3 %    Monocyte % 0.7 (L) 5.0 - 12.0 %    Eosinophil % 0.0 (L) 0.3 - 6.2 %    Basophil % 0.1 0.0 - 1.5 %    Neutrophils, Absolute 5.60 1.70 - 7.00 10*3/mm3    Lymphocytes, Absolute 0.40 (L) 0.70 - 3.10 10*3/mm3    Monocytes, Absolute 0.00 (L) 0.10 - 0.90 10*3/mm3    Eosinophils, Absolute 0.00 0.00 - 0.40 10*3/mm3    Basophils, Absolute 0.00 0.00 - 0.20 10*3/mm3    nRBC 0.0 0.0 - 0.2 /100 WBC   ]    The following radiologic or non-invasive studies including the images have been independently reviewed by me and my impressions are as follows: monophasic flow pattens bilaterally       ASSESSMENT/PLAN: 74 y.o. male with progressive lifestyle limiting right calf 2 block claudication. He has no tissue loss or rest pain. He had lower extremity ABIs, which are incompressible bilaterally with digit pressure of right 20 and left 81 consistent with severe right and mild left peripheral vascular disease. He did have lower extremity arterial duplex scan with velocities right superficial femoral artery in 70s and popliteal artery in  600s.   He has failed medical therapy      Plan:   Cath lab in [place] for a left femoral approach with right leg arteriogram with popliteal angioplasty and possible stent.     I have discussed with the patient the following five points:  1-  I have been asked to see Holger Ibrahim for the treatment of the diagnosis of: severe right leg claudication  2- The planned treatment of this diagnosis is: arteriogram with possible stent  3- The risks of a procedure include but are not limited to the following: bleeding, thrombosis, damage to adjacent anatomical structures including nerves or blood vessels, infections, wound healing problems, the need for further procedures, whether planned or emergent. The benefits of a procedure include but are not limited to the following: decrease in claudication  4- Alternatives to the planned procedure include:conservative management   5- The natural history of the diagnosis if no treatment is given is: slow progression   Assessment/Plan       * No active hospital problems. *      Jonny Ferguson MD   11/05/19

## 2019-11-25 ENCOUNTER — TRANSCRIBE ORDERS (OUTPATIENT)
Dept: ADMINISTRATIVE | Facility: HOSPITAL | Age: 74
End: 2019-11-25

## 2019-11-25 DIAGNOSIS — I73.9 PERIPHERAL VASCULAR DISEASE, UNSPECIFIED (HCC): Primary | ICD-10-CM

## 2019-11-26 ENCOUNTER — HOSPITAL ENCOUNTER (OUTPATIENT)
Dept: CARDIOLOGY | Facility: HOSPITAL | Age: 74
Discharge: HOME | End: 2019-11-26
Admitting: SURGERY

## 2019-11-26 ENCOUNTER — APPOINTMENT (OUTPATIENT)
Dept: VASCULAR SURGERY | Facility: HOSPITAL | Age: 74
End: 2019-11-26

## 2019-11-26 DIAGNOSIS — I73.9 PERIPHERAL VASCULAR DISEASE, UNSPECIFIED (HCC): ICD-10-CM

## 2019-11-26 LAB
BH CV LOWER ARTERIAL LEFT DORSALIS PEDIS SYS MAX: 255 MMHG
BH CV LOWER ARTERIAL LEFT GREAT TOE SYS MAX: 131 MMHG
BH CV LOWER ARTERIAL LEFT POST TIBIAL SYS MAX: 255 MMHG
BH CV LOWER ARTERIAL LEFT TBI RATIO: 0.73
BH CV LOWER ARTERIAL RIGHT ABI RATIO: 0.72
BH CV LOWER ARTERIAL RIGHT DORSALIS PEDIS SYS MAX: 255 MMHG
BH CV LOWER ARTERIAL RIGHT GREAT TOE SYS MAX: 71 MMHG
BH CV LOWER ARTERIAL RIGHT POST TIBIAL SYS MAX: 130 MMHG
BH CV LOWER ARTERIAL RIGHT TBI RATIO: 0.39
UPPER ARTERIAL LEFT ARM BRACHIAL SYS MAX: 178 MMHG
UPPER ARTERIAL RIGHT ARM BRACHIAL SYS MAX: 180 MMHG

## 2019-11-26 PROCEDURE — 93922 UPR/L XTREMITY ART 2 LEVELS: CPT

## 2019-11-26 PROCEDURE — G0463 HOSPITAL OUTPT CLINIC VISIT: HCPCS

## 2019-11-27 ENCOUNTER — TRANSCRIBE ORDERS (OUTPATIENT)
Dept: ADMINISTRATIVE | Facility: HOSPITAL | Age: 74
End: 2019-11-27

## 2019-11-27 DIAGNOSIS — I73.9 PERIPHERAL VASCULAR DISEASE, UNSPECIFIED (HCC): Primary | ICD-10-CM

## 2020-03-03 ENCOUNTER — OFFICE VISIT (OUTPATIENT)
Dept: CARDIOLOGY | Facility: CLINIC | Age: 75
End: 2020-03-03

## 2020-03-03 VITALS
BODY MASS INDEX: 19.61 KG/M2 | WEIGHT: 137 LBS | SYSTOLIC BLOOD PRESSURE: 138 MMHG | HEART RATE: 60 BPM | HEIGHT: 70 IN | DIASTOLIC BLOOD PRESSURE: 66 MMHG | OXYGEN SATURATION: 100 % | RESPIRATION RATE: 18 BRPM

## 2020-03-03 DIAGNOSIS — I73.9 PVD (PERIPHERAL VASCULAR DISEASE) WITH CLAUDICATION (HCC): ICD-10-CM

## 2020-03-03 DIAGNOSIS — I51.89 DIASTOLIC DYSFUNCTION: ICD-10-CM

## 2020-03-03 DIAGNOSIS — I35.0 AORTIC STENOSIS, MODERATE: Primary | ICD-10-CM

## 2020-03-03 DIAGNOSIS — R68.89 ABNORMAL ANKLE BRACHIAL INDEX (ABI): ICD-10-CM

## 2020-03-03 DIAGNOSIS — I10 ESSENTIAL HYPERTENSION: ICD-10-CM

## 2020-03-03 PROCEDURE — 99213 OFFICE O/P EST LOW 20 MIN: CPT | Performed by: NURSE PRACTITIONER

## 2020-03-03 PROCEDURE — 93000 ELECTROCARDIOGRAM COMPLETE: CPT | Performed by: NURSE PRACTITIONER

## 2020-03-03 NOTE — PROGRESS NOTES
[facility] CARDIOLOGY      REASON FOR FOLLOW-UP:  Peripheral vascular disease  Aortic valve stenosis  Diastolic dysfunction            Chief Complaint   Patient presents with   • Aortic Stenosis     6 month follow up          Dear Dr. Priest,    History of Present Illness     I had the pleasure of seeing Holger in the office today.  This is a pleasant 74-year-old  male with no known history of ischemic heart disease.  He does have a history of peripheral vascular disease, aortic valve stenosis, diastolic dysfunction, peripheral arterial disease status post PTA of his right popliteal artery in March of 2017.   Patient was referred to vascular surgeon.    Mr. Ibrahim was seen in vascular consultation by Dr. Ferguson.  He underwent percutaneous angioplasty of the right popliteal artery 11/5/2019 the patient presents in follow-up from that procedure.  Today, the patient reports no further pain in his right leg.  He is smiling, very happy that he is able to do all the activities he wants to do.  He denies any numbness or tingling in his foot, no paresthesias.  In addition, the patient reports no chest discomfort, shortness of breath, dizziness or lightheadedness.  No abnormal bleeding.  His blood pressure is under excellent control at 138/66.  EKG shows sinus rhythm.             Assessment:  1.  claudication  2. Peripheral arterial disease status post PTA right popliteal artery 03/2017  3. Diabetes mellitus.  4. Hypertension  5. Reformed tobacco abuser.  6. Aortic stenosis  7.  Abnormal ABIs        Plan:  Recommend yearly echo  Follow-up in 6 months or sooner if needed       The following portions of the patient's history were reviewed and updated as appropriate: allergies, current medications, past family history, past medical history, past social history, past surgical history and problem list.    REVIEW OF SYSTEMS:    Review of Systems   All other systems reviewed and are  negative.      Vitals:    03/03/20 1313   BP: 138/66   Pulse: 60   Resp: 18   SpO2: 100%         PHYSICAL EXAM:    General: Well-developed, well-nourished 74-year-old -American gentleman who is alert, cooperative, no distress, appears stated age  Head:  Normocephalic, atraumatic, mucous membranes moist  Eyes:  Conjunctiva/corneas clear, EOM's intact     Neck:  Supple,  no JVD or bruit     Lungs: Clear to auscultation bilaterally, no wheezes rhonchi rales are noted  Chest wall: No tenderness  Musculoskeletal:   Ambulates freely with slow shuffling gait  Heart::  Regular rate and rhythm, S1 and S2 normal, no murmur, rub or gallop  Abdomen: Soft, non-tender, nondistended bowel sounds active, no abdominal bruit  Extremities: No cyanosis, clubbing, or edema   Pulses: 2+ and symmetric all extremities  Skin:  No rashes or lesions  Neuro/psych: A&O x3. CN II through XII are grossly intact with appropriate affect        Past Medical History:   Diagnosis Date   • Alcoholism (CMS/HCC)     hx of   • DM2 (diabetes mellitus, type 2) (CMS/[facility])    • Hyperlipidemia    • Hypertension    • PVD (peripheral vascular disease) (CMS/[facility])     PTA 2017       Past Surgical History:   Procedure Laterality Date   • APPENDECTOMY     • CARDIAC CATHETERIZATION N/A 11/5/2019    Procedure: PERIPHERAL ANGIOGRAPHY, Rt popliteal PTA;  Surgeon: Jonny Ferguson MD;  Location: [facility] INVASIVE LOCATION;  Service: Cardiovascular   • EYE SURGERY Right    • HERNIA REPAIR     • LEG SURGERY  01/2020   • POPLITEAL ARTERY ANGIOPLASTY Right 2017   • SPLENECTOMY      partial spleen removal         Current Outpatient Medications:   •  amLODIPine (NORVASC) 5 MG tablet, Take 5 mg by mouth Daily., Disp: , Rfl:   •  ammonium lactate (AMLACTIN) 12 % cream, Apply  topically to the appropriate area as directed., Disp: , Rfl:   •  aspirin (ASPIR-LOW) 81 MG EC tablet, Take 81 mg by mouth., Disp: , Rfl:   •  atorvastatin (LIPITOR) 40 MG tablet, Take 40 mg  "by mouth Daily., Disp: , Rfl:   •  clopidogrel (PLAVIX) 75 MG tablet, Take 75 mg by mouth Daily., Disp: , Rfl:   •  diphenhydrAMINE (BENADRYL) 50 MG capsule, Take 50 mg by mouth 1 (One) Time., Disp: , Rfl:   •  glucose blood (ONE TOUCH ULTRA TEST) test strip, TEST 2 TIMES DAILY AS INSTRUCTED, Disp: , Rfl:   •  Insulin Glargine (LANTUS SOLOSTAR) 100 UNIT/ML injection pen, Inject 32 Units under the skin into the appropriate area as directed Every Night., Disp: , Rfl:   •  Insulin Pen Needle (BD ULTRA-FINE PEN NEEDLES) 29G X 12.7MM misc, ONE MISCELLANEOUS EVERY EVENING, Disp: , Rfl:   •  Insulin Syringe-Needle U-100 30G X 1/2\" 0.5 ML misc, USES TWICE A DAY AS DIRECTED. DX: 250.00, Disp: , Rfl:   •  lisinopril-hydrochlorothiazide (PRINZIDE,ZESTORETIC) 20-25 MG per tablet, Take 1 tablet by mouth Daily., Disp: , Rfl:   •  Multiple Vitamins-Minerals (MULTIVITAMIN ADULT) tablet, Take 1 mg by mouth Daily., Disp: , Rfl:   •  predniSONE (DELTASONE) 50 MG tablet, Take 50 mg by mouth 1 (One) Time., Disp: , Rfl:     Allergies   Allergen Reactions   • Contrast Dye Itching       Family History   Problem Relation Age of Onset   • Cancer Mother    • Hypertension Father    • Hypertension Brother        Social History     Tobacco Use   • Smoking status: Current Some Day Smoker     Packs/day: 0.25     Types: Cigarettes   • Smokeless tobacco: Never Used   Substance Use Topics   • Alcohol use: No     Frequency: Never           Current Electrocardiogram:    ECG 12 Lead  Date/Time: 3/3/2020 4:04 PM  Performed by: Evelina Garcia APRN  Authorized by: Evelina Garcai APRN   Comparison: not compared with previous ECG   Rhythm: sinus rhythm  BPM: 60  Q waves: V2                  JAH Tipton  03/04/20  4:03 PM              "

## 2020-03-04 PROBLEM — I10 ESSENTIAL HYPERTENSION: Status: ACTIVE | Noted: 2020-03-04

## 2020-03-05 ENCOUNTER — APPOINTMENT (OUTPATIENT)
Dept: CARDIOLOGY | Facility: HOSPITAL | Age: 75
End: 2020-03-05

## 2020-03-05 ENCOUNTER — HOSPITAL ENCOUNTER (OUTPATIENT)
Dept: CARDIOLOGY | Facility: HOSPITAL | Age: 75
Discharge: HOME | End: 2020-03-05

## 2020-03-05 ENCOUNTER — HOSPITAL ENCOUNTER (OUTPATIENT)
Dept: CARDIOLOGY | Facility: HOSPITAL | Age: 75
Discharge: HOME | End: 2020-03-05
Admitting: SURGERY

## 2020-03-05 ENCOUNTER — APPOINTMENT (OUTPATIENT)
Dept: VASCULAR SURGERY | Facility: HOSPITAL | Age: 75
End: 2020-03-05

## 2020-03-05 ENCOUNTER — TRANSCRIBE ORDERS (OUTPATIENT)
Dept: ADMINISTRATIVE | Facility: HOSPITAL | Age: 75
End: 2020-03-05

## 2020-03-05 DIAGNOSIS — I65.23 BILATERAL CAROTID ARTERY OCCLUSION: ICD-10-CM

## 2020-03-05 DIAGNOSIS — I65.23 BILATERAL CAROTID ARTERY OCCLUSION: Primary | ICD-10-CM

## 2020-03-05 DIAGNOSIS — I73.9 PERIPHERAL VASCULAR DISEASE, UNSPECIFIED (HCC): ICD-10-CM

## 2020-03-05 PROCEDURE — 93922 UPR/L XTREMITY ART 2 LEVELS: CPT

## 2020-03-05 PROCEDURE — G0463 HOSPITAL OUTPT CLINIC VISIT: HCPCS

## 2020-03-05 PROCEDURE — 93880 EXTRACRANIAL BILAT STUDY: CPT

## 2020-03-06 LAB
BH CV XLRA MEAS LEFT BULB EDV: -42.3 CM/SEC
BH CV XLRA MEAS LEFT BULB PSV: -223 CM/SEC
BH CV XLRA MEAS LEFT CCA RATIO VEL: -262 CM/SEC
BH CV XLRA MEAS LEFT DIST CCA EDV: -40.8 CM/SEC
BH CV XLRA MEAS LEFT DIST CCA PSV: -262 CM/SEC
BH CV XLRA MEAS LEFT DIST ICA EDV: -23.2 CM/SEC
BH CV XLRA MEAS LEFT DIST ICA PSV: -93 CM/SEC
BH CV XLRA MEAS LEFT ICA RATIO VEL: -333 CM/SEC
BH CV XLRA MEAS LEFT ICA/CCA RATIO: 1.3
BH CV XLRA MEAS LEFT MID ICA EDV: -36.6 CM/SEC
BH CV XLRA MEAS LEFT MID ICA PSV: -141 CM/SEC
BH CV XLRA MEAS LEFT PROX CCA EDV: 14.1 CM/SEC
BH CV XLRA MEAS LEFT PROX CCA PSV: 66.6 CM/SEC
BH CV XLRA MEAS LEFT PROX ECA PSV: -187 CM/SEC
BH CV XLRA MEAS LEFT PROX ICA EDV: 83 CM/SEC
BH CV XLRA MEAS LEFT PROX ICA PSV: 337 CM/SEC
BH CV XLRA MEAS LEFT PROX SCLA PSV: 149 CM/SEC
BH CV XLRA MEAS LEFT VERTEBRAL A PSV: -84.3 CM/SEC
BH CV XLRA MEAS RIGHT BULB EDV: -83.3 CM/SEC
BH CV XLRA MEAS RIGHT BULB PSV: -337 CM/SEC
BH CV XLRA MEAS RIGHT CCA RATIO VEL: -101 CM/SEC
BH CV XLRA MEAS RIGHT DIST CCA EDV: -16.8 CM/SEC
BH CV XLRA MEAS RIGHT DIST CCA PSV: -101 CM/SEC
BH CV XLRA MEAS RIGHT DIST ICA EDV: -23.1 CM/SEC
BH CV XLRA MEAS RIGHT DIST ICA PSV: -103 CM/SEC
BH CV XLRA MEAS RIGHT ICA RATIO VEL: -107 CM/SEC
BH CV XLRA MEAS RIGHT ICA/CCA RATIO: 1.1
BH CV XLRA MEAS RIGHT PROX CCA EDV: -13.7 CM/SEC
BH CV XLRA MEAS RIGHT PROX CCA PSV: -75.8 CM/SEC
BH CV XLRA MEAS RIGHT PROX ECA PSV: -87 CM/SEC
BH CV XLRA MEAS RIGHT PROX ICA EDV: -18.9 CM/SEC
BH CV XLRA MEAS RIGHT PROX ICA PSV: -107 CM/SEC
BH CV XLRA MEAS RIGHT PROX SCLA PSV: 135 CM/SEC
BH CV XLRA MEAS RIGHT VERTEBRAL A PSV: -39.2 CM/SEC

## 2020-03-07 LAB
BH CV LOWER ARTERIAL LEFT DORSALIS PEDIS SYS MAX: 255 MMHG
BH CV LOWER ARTERIAL LEFT GREAT TOE SYS MAX: 94 MMHG
BH CV LOWER ARTERIAL LEFT POST TIBIAL SYS MAX: 255 MMHG
BH CV LOWER ARTERIAL LEFT TBI RATIO: 0.57
BH CV LOWER ARTERIAL RIGHT ABI RATIO: 0.91
BH CV LOWER ARTERIAL RIGHT DORSALIS PEDIS SYS MAX: 255 MMHG
BH CV LOWER ARTERIAL RIGHT GREAT TOE SYS MAX: 49 MMHG
BH CV LOWER ARTERIAL RIGHT POST TIBIAL SYS MAX: 150 MMHG
BH CV LOWER ARTERIAL RIGHT TBI RATIO: 0.3
UPPER ARTERIAL LEFT ARM BRACHIAL SYS MAX: 161 MMHG
UPPER ARTERIAL RIGHT ARM BRACHIAL SYS MAX: 164 MMHG

## 2020-09-17 ENCOUNTER — APPOINTMENT (OUTPATIENT)
Dept: VASCULAR SURGERY | Facility: HOSPITAL | Age: 75
End: 2020-09-17

## 2020-09-17 ENCOUNTER — HOSPITAL ENCOUNTER (OUTPATIENT)
Dept: CARDIOLOGY | Facility: HOSPITAL | Age: 75
Discharge: HOME | End: 2020-09-17

## 2020-09-17 ENCOUNTER — APPOINTMENT (OUTPATIENT)
Dept: CARDIOLOGY | Facility: HOSPITAL | Age: 75
End: 2020-09-17

## 2020-09-17 DIAGNOSIS — I65.23 BILATERAL CAROTID ARTERY OCCLUSION: ICD-10-CM

## 2020-09-17 DIAGNOSIS — I73.9 PERIPHERAL VASCULAR DISEASE, UNSPECIFIED (HCC): ICD-10-CM

## 2020-09-17 PROCEDURE — 93880 EXTRACRANIAL BILAT STUDY: CPT

## 2020-09-17 PROCEDURE — 93922 UPR/L XTREMITY ART 2 LEVELS: CPT

## 2020-09-17 PROCEDURE — G0463 HOSPITAL OUTPT CLINIC VISIT: HCPCS

## 2020-09-20 LAB
BH CV LOWER ARTERIAL LEFT ABI RATIO: 1.25
BH CV LOWER ARTERIAL LEFT GREAT TOE SYS MAX: 184 MMHG
BH CV LOWER ARTERIAL LEFT POST TIBIAL SYS MAX: 211 MMHG
BH CV LOWER ARTERIAL LEFT TBI RATIO: 1.09
BH CV LOWER ARTERIAL RIGHT ABI RATIO: 0.98
BH CV LOWER ARTERIAL RIGHT GREAT TOE SYS MAX: 132 MMHG
BH CV LOWER ARTERIAL RIGHT POST TIBIAL SYS MAX: 165 MMHG
BH CV LOWER ARTERIAL RIGHT TBI RATIO: 0.78
BH CV XLRA MEAS LEFT BULB EDV: -85.9 CM/SEC
BH CV XLRA MEAS LEFT BULB PSV: -333 CM/SEC
BH CV XLRA MEAS LEFT CCA RATIO VEL: -228 CM/SEC
BH CV XLRA MEAS LEFT DIST CCA EDV: -33.7 CM/SEC
BH CV XLRA MEAS LEFT DIST CCA PSV: -206 CM/SEC
BH CV XLRA MEAS LEFT DIST ICA EDV: -34 CM/SEC
BH CV XLRA MEAS LEFT DIST ICA PSV: -91 CM/SEC
BH CV XLRA MEAS LEFT ICA RATIO VEL: -329 CM/SEC
BH CV XLRA MEAS LEFT ICA/CCA RATIO: 1.6
BH CV XLRA MEAS LEFT MID CCA EDV: -39.8 CM/SEC
BH CV XLRA MEAS LEFT MID CCA PSV: -214 CM/SEC
BH CV XLRA MEAS LEFT PROX CCA EDV: 15.4 CM/SEC
BH CV XLRA MEAS LEFT PROX CCA PSV: 58.8 CM/SEC
BH CV XLRA MEAS LEFT PROX ECA PSV: -59.5 CM/SEC
BH CV XLRA MEAS LEFT PROX ICA EDV: 85.9 CM/SEC
BH CV XLRA MEAS LEFT PROX ICA PSV: 333 CM/SEC
BH CV XLRA MEAS LEFT PROX SCLA PSV: 131 CM/SEC
BH CV XLRA MEAS LEFT VERTEBRAL A PSV: -57.4 CM/SEC
BH CV XLRA MEAS RIGHT CCA RATIO VEL: -78.3 CM/SEC
BH CV XLRA MEAS RIGHT DIST CCA EDV: -19.9 CM/SEC
BH CV XLRA MEAS RIGHT DIST CCA PSV: -78.3 CM/SEC
BH CV XLRA MEAS RIGHT DIST ICA EDV: -32.9 CM/SEC
BH CV XLRA MEAS RIGHT DIST ICA PSV: -109 CM/SEC
BH CV XLRA MEAS RIGHT ICA RATIO VEL: -109 CM/SEC
BH CV XLRA MEAS RIGHT ICA/CCA RATIO: 1.4
BH CV XLRA MEAS RIGHT PROX CCA EDV: -17.4 CM/SEC
BH CV XLRA MEAS RIGHT PROX CCA PSV: -72.7 CM/SEC
BH CV XLRA MEAS RIGHT PROX ECA PSV: -70.2 CM/SEC
BH CV XLRA MEAS RIGHT PROX ICA EDV: -28 CM/SEC
BH CV XLRA MEAS RIGHT PROX ICA PSV: -98.2 CM/SEC
BH CV XLRA MEAS RIGHT PROX SCLA PSV: 94.6 CM/SEC
BH CV XLRA MEAS RIGHT VERTEBRAL A PSV: -69.4 CM/SEC
UPPER ARTERIAL LEFT ARM BRACHIAL SYS MAX: 160 MMHG
UPPER ARTERIAL RIGHT ARM BRACHIAL SYS MAX: 169 MMHG

## 2020-09-24 ENCOUNTER — TRANSCRIBE ORDERS (OUTPATIENT)
Dept: ADMINISTRATIVE | Facility: HOSPITAL | Age: 75
End: 2020-09-24

## 2020-09-24 DIAGNOSIS — I73.9 PERIPHERAL VASCULAR DISEASE, UNSPECIFIED (HCC): ICD-10-CM

## 2020-09-24 DIAGNOSIS — I65.23 BILATERAL CAROTID ARTERY OCCLUSION: Primary | ICD-10-CM

## 2021-03-19 ENCOUNTER — APPOINTMENT (OUTPATIENT)
Dept: CARDIOLOGY | Facility: HOSPITAL | Age: 76
End: 2021-03-19

## 2021-03-23 ENCOUNTER — HOSPITAL ENCOUNTER (OUTPATIENT)
Dept: CARDIOLOGY | Facility: HOSPITAL | Age: 76
Discharge: HOME | End: 2021-03-23

## 2021-03-23 DIAGNOSIS — I65.23 BILATERAL CAROTID ARTERY OCCLUSION: ICD-10-CM

## 2021-03-23 DIAGNOSIS — I73.9 PERIPHERAL VASCULAR DISEASE, UNSPECIFIED (HCC): ICD-10-CM

## 2021-03-23 LAB
BH CV LOWER ARTERIAL LEFT DORSALIS PEDIS SYS MAX: 254 MMHG
BH CV LOWER ARTERIAL LEFT GREAT TOE SYS MAX: 115 MMHG
BH CV LOWER ARTERIAL LEFT POST TIBIAL SYS MAX: 254 MMHG
BH CV LOWER ARTERIAL LEFT TBI RATIO: 0.77
BH CV LOWER ARTERIAL RIGHT ABI RATIO: 1.57
BH CV LOWER ARTERIAL RIGHT DORSALIS PEDIS SYS MAX: 254 MMHG
BH CV LOWER ARTERIAL RIGHT GREAT TOE SYS MAX: 58 MMHG
BH CV LOWER ARTERIAL RIGHT POST TIBIAL SYS MAX: 234 MMHG
BH CV LOWER ARTERIAL RIGHT TBI RATIO: 0.39
BH CV XLRA MEAS LEFT BULB EDV: -61.1 CM/SEC
BH CV XLRA MEAS LEFT BULB PSV: -234 CM/SEC
BH CV XLRA MEAS LEFT CCA RATIO VEL: 176 CM/SEC
BH CV XLRA MEAS LEFT DIST CCA EDV: -24.2 CM/SEC
BH CV XLRA MEAS LEFT DIST CCA PSV: -173 CM/SEC
BH CV XLRA MEAS LEFT DIST ICA EDV: -24.1 CM/SEC
BH CV XLRA MEAS LEFT DIST ICA PSV: -74.2 CM/SEC
BH CV XLRA MEAS LEFT ICA RATIO VEL: -339 CM/SEC
BH CV XLRA MEAS LEFT ICA/CCA RATIO: -1.9
BH CV XLRA MEAS LEFT MID CCA EDV: 34.9 CM/SEC
BH CV XLRA MEAS LEFT MID CCA PSV: 176 CM/SEC
BH CV XLRA MEAS LEFT PROX CCA EDV: 11.8 CM/SEC
BH CV XLRA MEAS LEFT PROX CCA PSV: 58 CM/SEC
BH CV XLRA MEAS LEFT PROX ECA PSV: -133 CM/SEC
BH CV XLRA MEAS LEFT PROX ICA EDV: -70.6 CM/SEC
BH CV XLRA MEAS LEFT PROX ICA PSV: -339 CM/SEC
BH CV XLRA MEAS LEFT PROX SCLA PSV: 144 CM/SEC
BH CV XLRA MEAS LEFT VERTEBRAL A PSV: -83.1 CM/SEC
BH CV XLRA MEAS RIGHT CCA RATIO VEL: 150 CM/SEC
BH CV XLRA MEAS RIGHT DIST CCA EDV: -17.4 CM/SEC
BH CV XLRA MEAS RIGHT DIST CCA PSV: -67.7 CM/SEC
BH CV XLRA MEAS RIGHT DIST ICA EDV: -22.4 CM/SEC
BH CV XLRA MEAS RIGHT DIST ICA PSV: -77 CM/SEC
BH CV XLRA MEAS RIGHT ICA RATIO VEL: -89.5 CM/SEC
BH CV XLRA MEAS RIGHT ICA/CCA RATIO: -0.6
BH CV XLRA MEAS RIGHT MID CCA EDV: 30 CM/SEC
BH CV XLRA MEAS RIGHT MID CCA PSV: 150 CM/SEC
BH CV XLRA MEAS RIGHT PROX CCA EDV: 11.8 CM/SEC
BH CV XLRA MEAS RIGHT PROX CCA PSV: 83.2 CM/SEC
BH CV XLRA MEAS RIGHT PROX ECA PSV: -64.6 CM/SEC
BH CV XLRA MEAS RIGHT PROX ICA EDV: -20.5 CM/SEC
BH CV XLRA MEAS RIGHT PROX ICA PSV: -89.5 CM/SEC
BH CV XLRA MEAS RIGHT PROX SCLA PSV: 110 CM/SEC
BH CV XLRA MEAS RIGHT VERTEBRAL A EDV: -8.9 CM/SEC
BH CV XLRA MEAS RIGHT VERTEBRAL A PSV: -51.1 CM/SEC
UPPER ARTERIAL LEFT ARM BRACHIAL SYS MAX: 141 MMHG
UPPER ARTERIAL RIGHT ARM BRACHIAL SYS MAX: 149 MMHG

## 2021-03-23 PROCEDURE — 93880 EXTRACRANIAL BILAT STUDY: CPT

## 2021-03-23 PROCEDURE — 93922 UPR/L XTREMITY ART 2 LEVELS: CPT

## 2021-03-24 ENCOUNTER — APPOINTMENT (OUTPATIENT)
Dept: VASCULAR SURGERY | Facility: HOSPITAL | Age: 76
End: 2021-03-24

## 2021-03-30 ENCOUNTER — APPOINTMENT (OUTPATIENT)
Dept: VASCULAR SURGERY | Facility: HOSPITAL | Age: 76
End: 2021-03-30

## 2021-03-30 PROCEDURE — G0463 HOSPITAL OUTPT CLINIC VISIT: HCPCS

## 2021-05-10 ENCOUNTER — TRANSCRIBE ORDERS (OUTPATIENT)
Dept: ADMINISTRATIVE | Facility: HOSPITAL | Age: 76
End: 2021-05-10

## 2021-05-10 DIAGNOSIS — I73.9 PERIPHERAL VASCULAR DISEASE, UNSPECIFIED (HCC): ICD-10-CM

## 2021-05-10 DIAGNOSIS — I65.23 BILATERAL CAROTID ARTERY OCCLUSION: Primary | ICD-10-CM

## 2021-09-30 ENCOUNTER — HOSPITAL ENCOUNTER (OUTPATIENT)
Dept: CARDIOLOGY | Facility: HOSPITAL | Age: 76
Discharge: HOME | End: 2021-09-30

## 2021-09-30 DIAGNOSIS — I65.23 BILATERAL CAROTID ARTERY OCCLUSION: ICD-10-CM

## 2021-09-30 DIAGNOSIS — I73.9 PERIPHERAL VASCULAR DISEASE, UNSPECIFIED (HCC): ICD-10-CM

## 2021-09-30 LAB
BH CV LOWER ARTERIAL LEFT ABI RATIO: 1.17
BH CV LOWER ARTERIAL LEFT DORSALIS PEDIS SYS MAX: 254 MMHG
BH CV LOWER ARTERIAL LEFT GREAT TOE SYS MAX: 109 MMHG
BH CV LOWER ARTERIAL LEFT POST TIBIAL SYS MAX: 187 MMHG
BH CV LOWER ARTERIAL LEFT TBI RATIO: 0.68
BH CV LOWER ARTERIAL RIGHT ABI RATIO: 1.47
BH CV LOWER ARTERIAL RIGHT DORSALIS PEDIS SYS MAX: 254 MMHG
BH CV LOWER ARTERIAL RIGHT GREAT TOE SYS MAX: 113 MMHG
BH CV LOWER ARTERIAL RIGHT POST TIBIAL SYS MAX: 235 MMHG
BH CV LOWER ARTERIAL RIGHT TBI RATIO: 0.71
BH CV XLRA MEAS LEFT BULB EDV: -20.8 CM/SEC
BH CV XLRA MEAS LEFT BULB PSV: -184 CM/SEC
BH CV XLRA MEAS LEFT CCA RATIO VEL: -200 CM/SEC
BH CV XLRA MEAS LEFT DIST CCA EDV: -24.7 CM/SEC
BH CV XLRA MEAS LEFT DIST CCA PSV: -200 CM/SEC
BH CV XLRA MEAS LEFT DIST ICA EDV: -23.4 CM/SEC
BH CV XLRA MEAS LEFT DIST ICA PSV: -102 CM/SEC
BH CV XLRA MEAS LEFT ICA RATIO VEL: -329 CM/SEC
BH CV XLRA MEAS LEFT ICA/CCA RATIO: 1.6
BH CV XLRA MEAS LEFT MID CCA EDV: 28 CM/SEC
BH CV XLRA MEAS LEFT MID CCA PSV: 163 CM/SEC
BH CV XLRA MEAS LEFT PROX CCA EDV: 10.8 CM/SEC
BH CV XLRA MEAS LEFT PROX CCA PSV: 51.6 CM/SEC
BH CV XLRA MEAS LEFT PROX ECA PSV: -270 CM/SEC
BH CV XLRA MEAS LEFT PROX ICA EDV: -81.2 CM/SEC
BH CV XLRA MEAS LEFT PROX ICA PSV: -329 CM/SEC
BH CV XLRA MEAS LEFT PROX SCLA PSV: 166 CM/SEC
BH CV XLRA MEAS LEFT VERTEBRAL A PSV: -165 CM/SEC
BH CV XLRA MEAS RIGHT CCA RATIO VEL: 72.4 CM/SEC
BH CV XLRA MEAS RIGHT DIST CCA EDV: -12.3 CM/SEC
BH CV XLRA MEAS RIGHT DIST CCA PSV: -62.3 CM/SEC
BH CV XLRA MEAS RIGHT DIST ICA EDV: -22.4 CM/SEC
BH CV XLRA MEAS RIGHT DIST ICA PSV: -91.3 CM/SEC
BH CV XLRA MEAS RIGHT ICA RATIO VEL: -91.3 CM/SEC
BH CV XLRA MEAS RIGHT ICA/CCA RATIO: -1.3
BH CV XLRA MEAS RIGHT PROX CCA EDV: 9.7 CM/SEC
BH CV XLRA MEAS RIGHT PROX CCA PSV: 72.4 CM/SEC
BH CV XLRA MEAS RIGHT PROX ECA PSV: -57.1 CM/SEC
BH CV XLRA MEAS RIGHT PROX ICA EDV: -14.9 CM/SEC
BH CV XLRA MEAS RIGHT PROX ICA PSV: -67.2 CM/SEC
BH CV XLRA MEAS RIGHT PROX SCLA PSV: 131 CM/SEC
BH CV XLRA MEAS RIGHT VERTEBRAL A PSV: -31.5 CM/SEC
BH CVPROX RIGHT ICA HIDDEN LRR: 1 CM
LEFT ARM BP: 154 MMHG
MAXIMAL PREDICTED HEART RATE: 147 BPM
MAXIMAL PREDICTED HEART RATE: 147 BPM
RIGHT ARM BP: 160 MMHG
STRESS TARGET HR: 125 BPM
STRESS TARGET HR: 125 BPM
UPPER ARTERIAL LEFT ARM BRACHIAL SYS MAX: 154 MMHG
UPPER ARTERIAL RIGHT ARM BRACHIAL SYS MAX: 160 MMHG

## 2021-09-30 PROCEDURE — 93922 UPR/L XTREMITY ART 2 LEVELS: CPT

## 2021-09-30 PROCEDURE — 93880 EXTRACRANIAL BILAT STUDY: CPT

## 2021-10-07 ENCOUNTER — APPOINTMENT (OUTPATIENT)
Dept: VASCULAR SURGERY | Facility: HOSPITAL | Age: 76
End: 2021-10-07

## 2021-10-07 PROCEDURE — G0463 HOSPITAL OUTPT CLINIC VISIT: HCPCS

## 2021-12-22 ENCOUNTER — TRANSCRIBE ORDERS (OUTPATIENT)
Dept: ADMINISTRATIVE | Facility: HOSPITAL | Age: 76
End: 2021-12-22

## 2021-12-22 DIAGNOSIS — I65.23 BILATERAL CAROTID ARTERY OCCLUSION: ICD-10-CM

## 2021-12-22 DIAGNOSIS — I73.9 PERIPHERAL VASCULAR DISEASE, UNSPECIFIED (HCC): Primary | ICD-10-CM

## 2022-01-01 ENCOUNTER — APPOINTMENT (OUTPATIENT)
Dept: CARDIOLOGY | Facility: HOSPITAL | Age: 77
End: 2022-01-01

## 2022-01-01 ENCOUNTER — APPOINTMENT (OUTPATIENT)
Dept: VASCULAR SURGERY | Facility: HOSPITAL | Age: 77
End: 2022-01-01

## 2022-01-01 ENCOUNTER — TRANSCRIBE ORDERS (OUTPATIENT)
Dept: ADMINISTRATIVE | Facility: HOSPITAL | Age: 77
End: 2022-01-01

## 2022-01-01 ENCOUNTER — HOSPITAL ENCOUNTER (OUTPATIENT)
Dept: CARDIOLOGY | Facility: HOSPITAL | Age: 77
Discharge: HOME | End: 2022-04-12

## 2022-01-01 ENCOUNTER — HOSPITAL ENCOUNTER (OUTPATIENT)
Dept: CARDIOLOGY | Facility: HOSPITAL | Age: 77
Discharge: HOME | End: 2022-11-15

## 2022-01-01 DIAGNOSIS — I73.9 PERIPHERAL VASCULAR DISEASE, UNSPECIFIED: ICD-10-CM

## 2022-01-01 DIAGNOSIS — I73.9 PERIPHERAL VASCULAR DISEASE, UNSPECIFIED: Primary | ICD-10-CM

## 2022-01-01 DIAGNOSIS — I65.23 BILATERAL CAROTID ARTERY OCCLUSION: ICD-10-CM

## 2022-01-01 LAB
BH CV LOWER ARTERIAL LEFT ABI RATIO: NORMAL
BH CV LOWER ARTERIAL LEFT ABI RATIO: NORMAL
BH CV LOWER ARTERIAL LEFT DORSALIS PEDIS SYS MAX: NORMAL
BH CV LOWER ARTERIAL LEFT DORSALIS PEDIS SYS MAX: NORMAL
BH CV LOWER ARTERIAL LEFT GREAT TOE SYS MAX: 101
BH CV LOWER ARTERIAL LEFT GREAT TOE SYS MAX: 91
BH CV LOWER ARTERIAL LEFT POST TIBIAL SYS MAX: NORMAL
BH CV LOWER ARTERIAL LEFT POST TIBIAL SYS MAX: NORMAL
BH CV LOWER ARTERIAL LEFT TBI RATIO: 0.67
BH CV LOWER ARTERIAL LEFT TBI RATIO: 0.77
BH CV LOWER ARTERIAL RIGHT ABI RATIO: 1.79
BH CV LOWER ARTERIAL RIGHT ABI RATIO: NORMAL
BH CV LOWER ARTERIAL RIGHT DORSALIS PEDIS SYS MAX: 242
BH CV LOWER ARTERIAL RIGHT DORSALIS PEDIS SYS MAX: NORMAL
BH CV LOWER ARTERIAL RIGHT GREAT TOE SYS MAX: 55
BH CV LOWER ARTERIAL RIGHT GREAT TOE SYS MAX: 58
BH CV LOWER ARTERIAL RIGHT POST TIBIAL SYS MAX: 109
BH CV LOWER ARTERIAL RIGHT POST TIBIAL SYS MAX: NORMAL
BH CV LOWER ARTERIAL RIGHT TBI RATIO: 0.42
BH CV LOWER ARTERIAL RIGHT TBI RATIO: 0.43
BH CV XLRA MEAS LEFT DIST CCA EDV: -39.9 CM/SEC
BH CV XLRA MEAS LEFT DIST CCA EDV: 32.9 CM/SEC
BH CV XLRA MEAS LEFT DIST CCA PSV: -194 CM/SEC
BH CV XLRA MEAS LEFT DIST CCA PSV: 142 CM/SEC
BH CV XLRA MEAS LEFT DIST ICA EDV: -23.6 CM/SEC
BH CV XLRA MEAS LEFT DIST ICA EDV: -30.6 CM/SEC
BH CV XLRA MEAS LEFT DIST ICA PSV: -76.4 CM/SEC
BH CV XLRA MEAS LEFT DIST ICA PSV: -87.8 CM/SEC
BH CV XLRA MEAS LEFT ICA/CCA RATIO: -2.23
BH CV XLRA MEAS LEFT ICA/CCA RATIO: 1.97
BH CV XLRA MEAS LEFT MID CCA EDV: -38.1 CM/SEC
BH CV XLRA MEAS LEFT MID CCA EDV: 17.4 CM/SEC
BH CV XLRA MEAS LEFT MID CCA PSV: -194 CM/SEC
BH CV XLRA MEAS LEFT MID CCA PSV: 96.3 CM/SEC
BH CV XLRA MEAS LEFT PROX CCA EDV: 11.8 CM/SEC
BH CV XLRA MEAS LEFT PROX CCA EDV: 12.6 CM/SEC
BH CV XLRA MEAS LEFT PROX CCA PSV: 48.3 CM/SEC
BH CV XLRA MEAS LEFT PROX CCA PSV: 57 CM/SEC
BH CV XLRA MEAS LEFT PROX ECA PSV: -76.3 CM/SEC
BH CV XLRA MEAS LEFT PROX ECA PSV: 93.9 CM/SEC
BH CV XLRA MEAS LEFT PROX ICA EDV: -71.1 CM/SEC
BH CV XLRA MEAS LEFT PROX ICA EDV: -89.4 CM/SEC
BH CV XLRA MEAS LEFT PROX ICA PSV: -317 CM/SEC
BH CV XLRA MEAS LEFT PROX ICA PSV: -383 CM/SEC
BH CV XLRA MEAS LEFT PROX SCLA PSV: 118 CM/SEC
BH CV XLRA MEAS LEFT PROX SCLA PSV: 127 CM/SEC
BH CV XLRA MEAS LEFT VERTEBRAL A PSV: -66.3 CM/SEC
BH CV XLRA MEAS LEFT VERTEBRAL A PSV: -94.9 CM/SEC
BH CV XLRA MEAS RIGHT DIST CCA EDV: -12.3 CM/SEC
BH CV XLRA MEAS RIGHT DIST CCA EDV: -14.5 CM/SEC
BH CV XLRA MEAS RIGHT DIST CCA PSV: -54.1 CM/SEC
BH CV XLRA MEAS RIGHT DIST CCA PSV: -71.1 CM/SEC
BH CV XLRA MEAS RIGHT DIST ICA EDV: -16.2 CM/SEC
BH CV XLRA MEAS RIGHT DIST ICA EDV: -24.9 CM/SEC
BH CV XLRA MEAS RIGHT DIST ICA PSV: -67.3 CM/SEC
BH CV XLRA MEAS RIGHT DIST ICA PSV: -83.2 CM/SEC
BH CV XLRA MEAS RIGHT ICA/CCA RATIO: 1.46
BH CV XLRA MEAS RIGHT ICA/CCA RATIO: 1.56
BH CV XLRA MEAS RIGHT PROX CCA EDV: -7.7 CM/SEC
BH CV XLRA MEAS RIGHT PROX CCA EDV: -9.7 CM/SEC
BH CV XLRA MEAS RIGHT PROX CCA PSV: -43 CM/SEC
BH CV XLRA MEAS RIGHT PROX CCA PSV: -46.9 CM/SEC
BH CV XLRA MEAS RIGHT PROX ECA PSV: -54.5 CM/SEC
BH CV XLRA MEAS RIGHT PROX ECA PSV: -90.1 CM/SEC
BH CV XLRA MEAS RIGHT PROX ICA EDV: -17 CM/SEC
BH CV XLRA MEAS RIGHT PROX ICA EDV: -23.8 CM/SEC
BH CV XLRA MEAS RIGHT PROX ICA PSV: -104 CM/SEC
BH CV XLRA MEAS RIGHT PROX ICA PSV: -84.5 CM/SEC
BH CV XLRA MEAS RIGHT PROX SCLA PSV: 93.2 CM/SEC
BH CV XLRA MEAS RIGHT PROX SCLA PSV: 97.8 CM/SEC
BH CV XLRA MEAS RIGHT VERTEBRAL A PSV: 26.7 CM/SEC
BH CV XLRA MEAS RIGHT VERTEBRAL A PSV: 33.3 CM/SEC
LEFT ARM BP: 130 MMHG
LEFT ARM BP: 132 MMHG
MAXIMAL PREDICTED HEART RATE: 145 BPM
MAXIMAL PREDICTED HEART RATE: 145 BPM
MAXIMAL PREDICTED HEART RATE: 146 BPM
MAXIMAL PREDICTED HEART RATE: 146 BPM
RIGHT ARM BP: 130 MMHG
RIGHT ARM BP: 135 MMHG
STRESS TARGET HR: 123 BPM
STRESS TARGET HR: 123 BPM
STRESS TARGET HR: 124 BPM
STRESS TARGET HR: 124 BPM
UPPER ARTERIAL LEFT ARM BRACHIAL SYS MAX: 130
UPPER ARTERIAL LEFT ARM BRACHIAL SYS MAX: 132 MMHG
UPPER ARTERIAL RIGHT ARM BRACHIAL SYS MAX: 130 MMHG
UPPER ARTERIAL RIGHT ARM BRACHIAL SYS MAX: 135

## 2022-01-01 PROCEDURE — 93880 EXTRACRANIAL BILAT STUDY: CPT

## 2022-01-01 PROCEDURE — G0463 HOSPITAL OUTPT CLINIC VISIT: HCPCS

## 2022-01-01 PROCEDURE — 93922 UPR/L XTREMITY ART 2 LEVELS: CPT

## 2022-03-11 ENCOUNTER — TRANSCRIBE ORDERS (OUTPATIENT)
Dept: ADMINISTRATIVE | Facility: HOSPITAL | Age: 77
End: 2022-03-11

## 2022-03-11 ENCOUNTER — HOSPITAL ENCOUNTER (OUTPATIENT)
Dept: CARDIOLOGY | Facility: HOSPITAL | Age: 77
Discharge: HOME | End: 2022-03-11
Admitting: NURSE PRACTITIONER

## 2022-03-11 DIAGNOSIS — R60.0 BILATERAL LOWER EXTREMITY EDEMA: Primary | ICD-10-CM

## 2022-03-11 DIAGNOSIS — R60.0 BILATERAL LOWER EXTREMITY EDEMA: ICD-10-CM

## 2022-03-11 DIAGNOSIS — R60.0 EDEMA OF BOTH LEGS: Primary | ICD-10-CM

## 2022-03-11 LAB

## 2022-03-11 PROCEDURE — 93970 EXTREMITY STUDY: CPT

## 2022-03-17 ENCOUNTER — APPOINTMENT (OUTPATIENT)
Dept: CARDIOLOGY | Facility: HOSPITAL | Age: 77
End: 2022-03-17

## 2023-01-01 ENCOUNTER — HOSPITAL ENCOUNTER (INPATIENT)
Facility: HOSPITAL | Age: 78
LOS: 5 days | Discharge: NURSING, REHAB OR LONG-TERM CARE | DRG: 193 | End: 2023-03-13
Attending: EMERGENCY MEDICINE | Admitting: STUDENT IN AN ORGANIZED HEALTH CARE EDUCATION/TRAINING PROGRAM
Payer: MEDICARE

## 2023-01-01 ENCOUNTER — APPOINTMENT (OUTPATIENT)
Dept: GENERAL RADIOLOGY | Facility: HOSPITAL | Age: 78
DRG: 193 | End: 2023-01-01
Payer: MEDICARE

## 2023-01-01 ENCOUNTER — APPOINTMENT (OUTPATIENT)
Dept: GENERAL RADIOLOGY | Facility: HOSPITAL | Age: 78
DRG: 637 | End: 2023-01-01
Payer: MEDICARE

## 2023-01-01 ENCOUNTER — HOSPITAL ENCOUNTER (INPATIENT)
Facility: HOSPITAL | Age: 78
LOS: 2 days | DRG: 637 | End: 2023-03-24
Attending: EMERGENCY MEDICINE | Admitting: INTERNAL MEDICINE
Payer: MEDICARE

## 2023-01-01 ENCOUNTER — HOSPITAL ENCOUNTER (INPATIENT)
Facility: HOSPITAL | Age: 78
LOS: 10 days | Discharge: NURSING, REHAB OR LONG-TERM CARE | DRG: 637 | End: 2023-03-07
Attending: EMERGENCY MEDICINE | Admitting: INTERNAL MEDICINE
Payer: MEDICARE

## 2023-01-01 ENCOUNTER — APPOINTMENT (OUTPATIENT)
Dept: CARDIOLOGY | Facility: HOSPITAL | Age: 78
DRG: 637 | End: 2023-01-01
Payer: MEDICARE

## 2023-01-01 ENCOUNTER — APPOINTMENT (OUTPATIENT)
Dept: INTERVENTIONAL RADIOLOGY/VASCULAR | Facility: HOSPITAL | Age: 78
DRG: 193 | End: 2023-01-01
Payer: MEDICARE

## 2023-01-01 VITALS
WEIGHT: 165.34 LBS | SYSTOLIC BLOOD PRESSURE: 126 MMHG | RESPIRATION RATE: 14 BRPM | DIASTOLIC BLOOD PRESSURE: 88 MMHG | HEART RATE: 62 BPM | HEIGHT: 70 IN | OXYGEN SATURATION: 98 % | TEMPERATURE: 97.4 F | BODY MASS INDEX: 23.67 KG/M2

## 2023-01-01 VITALS
OXYGEN SATURATION: 98 % | TEMPERATURE: 97.8 F | RESPIRATION RATE: 16 BRPM | SYSTOLIC BLOOD PRESSURE: 98 MMHG | HEIGHT: 70 IN | HEART RATE: 66 BPM | DIASTOLIC BLOOD PRESSURE: 58 MMHG | WEIGHT: 180.78 LBS | BODY MASS INDEX: 25.88 KG/M2

## 2023-01-01 VITALS
HEART RATE: 89 BPM | WEIGHT: 171.74 LBS | DIASTOLIC BLOOD PRESSURE: 49 MMHG | TEMPERATURE: 98.8 F | SYSTOLIC BLOOD PRESSURE: 81 MMHG | OXYGEN SATURATION: 96 % | BODY MASS INDEX: 24.59 KG/M2 | HEIGHT: 70 IN | RESPIRATION RATE: 27 BRPM

## 2023-01-01 DIAGNOSIS — E87.79 OTHER HYPERVOLEMIA: ICD-10-CM

## 2023-01-01 DIAGNOSIS — E16.2 HYPOGLYCEMIA: Primary | ICD-10-CM

## 2023-01-01 DIAGNOSIS — J18.9 PNEUMONIA DUE TO INFECTIOUS ORGANISM, UNSPECIFIED LATERALITY, UNSPECIFIED PART OF LUNG: ICD-10-CM

## 2023-01-01 LAB
ABO GROUP BLD: NORMAL
ACTH PLAS-MCNC: 23 PG/ML (ref 7.2–63.3)
ACTH PLAS-MCNC: 28.6 PG/ML (ref 7.2–63.3)
ALBUMIN SERPL-MCNC: 2.3 G/DL (ref 3.5–5.2)
ALBUMIN SERPL-MCNC: 2.5 G/DL (ref 3.5–5.2)
ALBUMIN SERPL-MCNC: 2.6 G/DL (ref 3.5–5.2)
ALBUMIN SERPL-MCNC: 2.7 G/DL (ref 3.5–5.2)
ALBUMIN SERPL-MCNC: 2.8 G/DL (ref 3.5–5.2)
ALBUMIN SERPL-MCNC: 2.9 G/DL (ref 3.5–5.2)
ALBUMIN SERPL-MCNC: 3 G/DL (ref 3.5–5.2)
ALBUMIN SERPL-MCNC: 3.1 G/DL (ref 3.5–5.2)
ALBUMIN/GLOB SERPL: 0.6 G/DL
ALBUMIN/GLOB SERPL: 0.7 G/DL
ALBUMIN/GLOB SERPL: 1.1 G/DL
ALP SERPL-CCNC: 107 U/L (ref 39–117)
ALP SERPL-CCNC: 111 U/L (ref 39–117)
ALP SERPL-CCNC: 112 U/L (ref 39–117)
ALP SERPL-CCNC: 113 U/L (ref 39–117)
ALP SERPL-CCNC: 113 U/L (ref 39–117)
ALP SERPL-CCNC: 114 U/L (ref 39–117)
ALP SERPL-CCNC: 114 U/L (ref 39–117)
ALP SERPL-CCNC: 118 U/L (ref 39–117)
ALP SERPL-CCNC: 123 U/L (ref 39–117)
ALP SERPL-CCNC: 125 U/L (ref 39–117)
ALP SERPL-CCNC: 132 U/L (ref 39–117)
ALP SERPL-CCNC: 133 U/L (ref 39–117)
ALP SERPL-CCNC: 137 U/L (ref 39–117)
ALP SERPL-CCNC: 225 U/L (ref 39–117)
ALT SERPL W P-5'-P-CCNC: 17 U/L (ref 1–41)
ALT SERPL W P-5'-P-CCNC: 18 U/L (ref 1–41)
ALT SERPL W P-5'-P-CCNC: 19 U/L (ref 1–41)
ALT SERPL W P-5'-P-CCNC: 19 U/L (ref 1–41)
ALT SERPL W P-5'-P-CCNC: 20 U/L (ref 1–41)
ALT SERPL W P-5'-P-CCNC: 21 U/L (ref 1–41)
ALT SERPL W P-5'-P-CCNC: 21 U/L (ref 1–41)
ALT SERPL W P-5'-P-CCNC: 23 U/L (ref 1–41)
ALT SERPL W P-5'-P-CCNC: 24 U/L (ref 1–41)
ALT SERPL W P-5'-P-CCNC: 25 U/L (ref 1–41)
ALT SERPL W P-5'-P-CCNC: 30 U/L (ref 1–41)
ALT SERPL W P-5'-P-CCNC: 42 U/L (ref 1–41)
ANION GAP SERPL CALCULATED.3IONS-SCNC: 10 MMOL/L (ref 5–15)
ANION GAP SERPL CALCULATED.3IONS-SCNC: 11 MMOL/L (ref 5–15)
ANION GAP SERPL CALCULATED.3IONS-SCNC: 11 MMOL/L (ref 5–15)
ANION GAP SERPL CALCULATED.3IONS-SCNC: 12 MMOL/L (ref 5–15)
ANION GAP SERPL CALCULATED.3IONS-SCNC: 13 MMOL/L (ref 5–15)
ANION GAP SERPL CALCULATED.3IONS-SCNC: 14 MMOL/L (ref 5–15)
ANION GAP SERPL CALCULATED.3IONS-SCNC: 15 MMOL/L (ref 5–15)
ANION GAP SERPL CALCULATED.3IONS-SCNC: 15 MMOL/L (ref 5–15)
ANION GAP SERPL CALCULATED.3IONS-SCNC: 16 MMOL/L (ref 5–15)
ANION GAP SERPL CALCULATED.3IONS-SCNC: 16 MMOL/L (ref 5–15)
ANION GAP SERPL CALCULATED.3IONS-SCNC: 17 MMOL/L (ref 5–15)
ANION GAP SERPL CALCULATED.3IONS-SCNC: 17 MMOL/L (ref 5–15)
ANION GAP SERPL CALCULATED.3IONS-SCNC: 18 MMOL/L (ref 5–15)
APTT PPP: 117.3 SECONDS (ref 61–76.5)
APTT PPP: 34.8 SECONDS (ref 24–31)
AST SERPL-CCNC: 22 U/L (ref 1–40)
AST SERPL-CCNC: 23 U/L (ref 1–40)
AST SERPL-CCNC: 24 U/L (ref 1–40)
AST SERPL-CCNC: 25 U/L (ref 1–40)
AST SERPL-CCNC: 25 U/L (ref 1–40)
AST SERPL-CCNC: 28 U/L (ref 1–40)
AST SERPL-CCNC: 30 U/L (ref 1–40)
AST SERPL-CCNC: 33 U/L (ref 1–40)
AST SERPL-CCNC: 35 U/L (ref 1–40)
AST SERPL-CCNC: 38 U/L (ref 1–40)
AST SERPL-CCNC: 67 U/L (ref 1–40)
AST SERPL-CCNC: 88 U/L (ref 1–40)
B PARAPERT DNA SPEC QL NAA+PROBE: NOT DETECTED
B PARAPERT DNA SPEC QL NAA+PROBE: NOT DETECTED
B PERT DNA SPEC QL NAA+PROBE: NOT DETECTED
B PERT DNA SPEC QL NAA+PROBE: NOT DETECTED
BACTERIA ISLT: NORMAL
BACTERIA SPEC AEROBE CULT: NORMAL
BACTERIA SPEC AEROBE CULT: NORMAL
BACTERIA UR QL AUTO: ABNORMAL /HPF
BASOPHILS # BLD AUTO: 0 10*3/MM3 (ref 0–0.2)
BASOPHILS # BLD AUTO: 0.1 10*3/MM3 (ref 0–0.2)
BASOPHILS NFR BLD AUTO: 0.1 % (ref 0–1.5)
BASOPHILS NFR BLD AUTO: 0.2 % (ref 0–1.5)
BASOPHILS NFR BLD AUTO: 0.3 % (ref 0–1.5)
BASOPHILS NFR BLD AUTO: 0.3 % (ref 0–1.5)
BASOPHILS NFR BLD AUTO: 0.4 % (ref 0–1.5)
BASOPHILS NFR BLD AUTO: 0.5 % (ref 0–1.5)
BASOPHILS NFR BLD AUTO: 0.5 % (ref 0–1.5)
BASOPHILS NFR BLD AUTO: 0.6 % (ref 0–1.5)
BASOPHILS NFR BLD AUTO: 0.6 % (ref 0–1.5)
BASOPHILS NFR BLD AUTO: 0.7 % (ref 0–1.5)
BASOPHILS NFR BLD AUTO: 0.8 % (ref 0–1.5)
BASOPHILS NFR BLD AUTO: 0.9 % (ref 0–1.5)
BASOPHILS NFR BLD AUTO: 0.9 % (ref 0–1.5)
BASOPHILS NFR BLD AUTO: 1.1 % (ref 0–1.5)
BH BB BLOOD EXPIRATION DATE: NORMAL
BH BB BLOOD TYPE BARCODE: 6200
BH BB DISPENSE STATUS: NORMAL
BH BB PRODUCT CODE: NORMAL
BH BB UNIT NUMBER: NORMAL
BH CV ECHO MEAS - AI P1/2T: 277.3 MSEC
BH CV ECHO MEAS - AO MAX PG: 37.5 MMHG
BH CV ECHO MEAS - AO MEAN PG: 22 MMHG
BH CV ECHO MEAS - AO V2 MAX: 306 CM/SEC
BH CV ECHO MEAS - AO V2 VTI: 54.3 CM
BH CV ECHO MEAS - AVA(I,D): 1.22 CM2
BH CV ECHO MEAS - EDV(CUBED): 91.1 ML
BH CV ECHO MEAS - EDV(MOD-SP4): 66.8 ML
BH CV ECHO MEAS - EF(MOD-SP4): 35 %
BH CV ECHO MEAS - ESV(CUBED): 39.3 ML
BH CV ECHO MEAS - ESV(MOD-SP4): 43.4 ML
BH CV ECHO MEAS - FS: 24.4 %
BH CV ECHO MEAS - IVS/LVPW: 1 CM
BH CV ECHO MEAS - IVSD: 1.3 CM
BH CV ECHO MEAS - LA DIMENSION: 3.4 CM
BH CV ECHO MEAS - LV DIASTOLIC VOL/BSA (35-75): 34.5 CM2
BH CV ECHO MEAS - LV MASS(C)D: 222.6 GRAMS
BH CV ECHO MEAS - LV MAX PG: 4.3 MMHG
BH CV ECHO MEAS - LV MEAN PG: 2 MMHG
BH CV ECHO MEAS - LV SYSTOLIC VOL/BSA (12-30): 22.4 CM2
BH CV ECHO MEAS - LV V1 MAX: 104 CM/SEC
BH CV ECHO MEAS - LV V1 VTI: 15.9 CM
BH CV ECHO MEAS - LVIDD: 4.5 CM
BH CV ECHO MEAS - LVIDS: 3.4 CM
BH CV ECHO MEAS - LVOT AREA: 4.2 CM2
BH CV ECHO MEAS - LVOT DIAM: 2.3 CM
BH CV ECHO MEAS - LVPWD: 1.3 CM
BH CV ECHO MEAS - MV DEC SLOPE: 558 CM/SEC2
BH CV ECHO MEAS - MV MAX PG: 8.9 MMHG
BH CV ECHO MEAS - MV MEAN PG: 3 MMHG
BH CV ECHO MEAS - MV P1/2T: 84 MSEC
BH CV ECHO MEAS - MV V2 VTI: 39.9 CM
BH CV ECHO MEAS - MVA(P1/2T): 2.6 CM2
BH CV ECHO MEAS - MVA(VTI): 1.66 CM2
BH CV ECHO MEAS - PA V2 MAX: 82.1 CM/SEC
BH CV ECHO MEAS - QP/QS: 0.4
BH CV ECHO MEAS - RV MAX PG: 1.29 MMHG
BH CV ECHO MEAS - RV V1 MAX: 56.7 CM/SEC
BH CV ECHO MEAS - RV V1 VTI: 9.2 CM
BH CV ECHO MEAS - RVDD: 2.4 CM
BH CV ECHO MEAS - RVOT DIAM: 1.9 CM
BH CV ECHO MEAS - SI(MOD-SP4): 12.1 ML/M2
BH CV ECHO MEAS - SV(LVOT): 66.1 ML
BH CV ECHO MEAS - SV(MOD-SP4): 23.4 ML
BH CV ECHO MEAS - SV(RVOT): 26.1 ML
BH CV ECHO MEAS - TR MAX PG: 28.3 MMHG
BH CV ECHO MEAS - TR MAX VEL: 266 CM/SEC
BILIRUB SERPL-MCNC: 0.2 MG/DL (ref 0–1.2)
BILIRUB SERPL-MCNC: 0.3 MG/DL (ref 0–1.2)
BILIRUB SERPL-MCNC: 0.3 MG/DL (ref 0–1.2)
BILIRUB SERPL-MCNC: <0.2 MG/DL (ref 0–1.2)
BILIRUB UR QL STRIP: ABNORMAL
BILIRUB UR QL STRIP: NEGATIVE
BLD GP AB SCN SERPL QL: NEGATIVE
BUN SERPL-MCNC: 22 MG/DL (ref 8–23)
BUN SERPL-MCNC: 33 MG/DL (ref 8–23)
BUN SERPL-MCNC: 34 MG/DL (ref 8–23)
BUN SERPL-MCNC: 57 MG/DL (ref 8–23)
BUN SERPL-MCNC: 57 MG/DL (ref 8–23)
BUN SERPL-MCNC: 63 MG/DL (ref 8–23)
BUN SERPL-MCNC: 63 MG/DL (ref 8–23)
BUN SERPL-MCNC: 64 MG/DL (ref 8–23)
BUN SERPL-MCNC: 65 MG/DL (ref 8–23)
BUN SERPL-MCNC: 65 MG/DL (ref 8–23)
BUN SERPL-MCNC: 68 MG/DL (ref 8–23)
BUN SERPL-MCNC: 70 MG/DL (ref 8–23)
BUN SERPL-MCNC: 71 MG/DL (ref 8–23)
BUN SERPL-MCNC: 78 MG/DL (ref 8–23)
BUN SERPL-MCNC: 79 MG/DL (ref 8–23)
BUN SERPL-MCNC: 80 MG/DL (ref 8–23)
BUN SERPL-MCNC: 81 MG/DL (ref 8–23)
BUN SERPL-MCNC: 83 MG/DL (ref 8–23)
BUN SERPL-MCNC: 86 MG/DL (ref 8–23)
BUN SERPL-MCNC: 94 MG/DL (ref 8–23)
BUN SERPL-MCNC: 97 MG/DL (ref 8–23)
BUN SERPL-MCNC: 98 MG/DL (ref 8–23)
BUN SERPL-MCNC: 98 MG/DL (ref 8–23)
BUN/CREAT SERPL: 15.5 (ref 7–25)
BUN/CREAT SERPL: 16.4 (ref 7–25)
BUN/CREAT SERPL: 17 (ref 7–25)
BUN/CREAT SERPL: 17.1 (ref 7–25)
BUN/CREAT SERPL: 18.1 (ref 7–25)
BUN/CREAT SERPL: 18.6 (ref 7–25)
BUN/CREAT SERPL: 18.7 (ref 7–25)
BUN/CREAT SERPL: 18.9 (ref 7–25)
BUN/CREAT SERPL: 19 (ref 7–25)
BUN/CREAT SERPL: 19 (ref 7–25)
BUN/CREAT SERPL: 19.4 (ref 7–25)
BUN/CREAT SERPL: 19.4 (ref 7–25)
BUN/CREAT SERPL: 19.5 (ref 7–25)
BUN/CREAT SERPL: 19.5 (ref 7–25)
BUN/CREAT SERPL: 19.6 (ref 7–25)
BUN/CREAT SERPL: 19.7 (ref 7–25)
BUN/CREAT SERPL: 19.9 (ref 7–25)
BUN/CREAT SERPL: 19.9 (ref 7–25)
BUN/CREAT SERPL: 20.4 (ref 7–25)
BUN/CREAT SERPL: 20.7 (ref 7–25)
BUN/CREAT SERPL: 6.9 (ref 7–25)
BUN/CREAT SERPL: 7.2 (ref 7–25)
BUN/CREAT SERPL: 7.8 (ref 7–25)
C PNEUM DNA NPH QL NAA+NON-PROBE: NOT DETECTED
C PNEUM DNA NPH QL NAA+NON-PROBE: NOT DETECTED
CALCIUM SPEC-SCNC: 7.8 MG/DL (ref 8.6–10.5)
CALCIUM SPEC-SCNC: 7.9 MG/DL (ref 8.6–10.5)
CALCIUM SPEC-SCNC: 8 MG/DL (ref 8.6–10.5)
CALCIUM SPEC-SCNC: 8.1 MG/DL (ref 8.6–10.5)
CALCIUM SPEC-SCNC: 8.2 MG/DL (ref 8.6–10.5)
CALCIUM SPEC-SCNC: 8.3 MG/DL (ref 8.6–10.5)
CALCIUM SPEC-SCNC: 8.4 MG/DL (ref 8.6–10.5)
CALCIUM SPEC-SCNC: 8.6 MG/DL (ref 8.6–10.5)
CALCIUM SPEC-SCNC: 8.7 MG/DL (ref 8.6–10.5)
CHLORIDE SERPL-SCNC: 93 MMOL/L (ref 98–107)
CHLORIDE SERPL-SCNC: 94 MMOL/L (ref 98–107)
CHLORIDE SERPL-SCNC: 95 MMOL/L (ref 98–107)
CHLORIDE SERPL-SCNC: 96 MMOL/L (ref 98–107)
CHLORIDE SERPL-SCNC: 97 MMOL/L (ref 98–107)
CHLORIDE SERPL-SCNC: 98 MMOL/L (ref 98–107)
CHLORIDE SERPL-SCNC: 99 MMOL/L (ref 98–107)
CHLORIDE SERPL-SCNC: 99 MMOL/L (ref 98–107)
CHOLEST SERPL-MCNC: 136 MG/DL (ref 0–200)
CLARITY UR: ABNORMAL
CLARITY UR: ABNORMAL
CO2 SERPL-SCNC: 18 MMOL/L (ref 22–29)
CO2 SERPL-SCNC: 19 MMOL/L (ref 22–29)
CO2 SERPL-SCNC: 20 MMOL/L (ref 22–29)
CO2 SERPL-SCNC: 20 MMOL/L (ref 22–29)
CO2 SERPL-SCNC: 21 MMOL/L (ref 22–29)
CO2 SERPL-SCNC: 21 MMOL/L (ref 22–29)
CO2 SERPL-SCNC: 22 MMOL/L (ref 22–29)
CO2 SERPL-SCNC: 23 MMOL/L (ref 22–29)
CO2 SERPL-SCNC: 24 MMOL/L (ref 22–29)
CO2 SERPL-SCNC: 25 MMOL/L (ref 22–29)
CO2 SERPL-SCNC: 25 MMOL/L (ref 22–29)
CO2 SERPL-SCNC: 26 MMOL/L (ref 22–29)
COLOR UR: ABNORMAL
COLOR UR: ABNORMAL
CORTIS SERPL-MCNC: 16.58 MCG/DL
CORTIS SERPL-MCNC: 26.54 MCG/DL
CORTIS SERPL-MCNC: 30.21 MCG/DL
CREAT SERPL-MCNC: 3.19 MG/DL (ref 0.76–1.27)
CREAT SERPL-MCNC: 3.24 MG/DL (ref 0.76–1.27)
CREAT SERPL-MCNC: 3.25 MG/DL (ref 0.76–1.27)
CREAT SERPL-MCNC: 3.27 MG/DL (ref 0.76–1.27)
CREAT SERPL-MCNC: 3.27 MG/DL (ref 0.76–1.27)
CREAT SERPL-MCNC: 3.36 MG/DL (ref 0.76–1.27)
CREAT SERPL-MCNC: 3.48 MG/DL (ref 0.76–1.27)
CREAT SERPL-MCNC: 3.65 MG/DL (ref 0.76–1.27)
CREAT SERPL-MCNC: 3.67 MG/DL (ref 0.76–1.27)
CREAT SERPL-MCNC: 3.74 MG/DL (ref 0.76–1.27)
CREAT SERPL-MCNC: 4.05 MG/DL (ref 0.76–1.27)
CREAT SERPL-MCNC: 4.06 MG/DL (ref 0.76–1.27)
CREAT SERPL-MCNC: 4.18 MG/DL (ref 0.76–1.27)
CREAT SERPL-MCNC: 4.21 MG/DL (ref 0.76–1.27)
CREAT SERPL-MCNC: 4.27 MG/DL (ref 0.76–1.27)
CREAT SERPL-MCNC: 4.4 MG/DL (ref 0.76–1.27)
CREAT SERPL-MCNC: 4.55 MG/DL (ref 0.76–1.27)
CREAT SERPL-MCNC: 4.55 MG/DL (ref 0.76–1.27)
CREAT SERPL-MCNC: 4.73 MG/DL (ref 0.76–1.27)
CREAT SERPL-MCNC: 4.74 MG/DL (ref 0.76–1.27)
CREAT SERPL-MCNC: 4.81 MG/DL (ref 0.76–1.27)
CREAT SERPL-MCNC: 4.98 MG/DL (ref 0.76–1.27)
CREAT SERPL-MCNC: 5 MG/DL (ref 0.76–1.27)
CREAT UR-MCNC: 68 MG/DL
CROSSMATCH INTERPRETATION: NORMAL
CRP SERPL-MCNC: 12.94 MG/DL (ref 0–0.5)
D-LACTATE SERPL-SCNC: 1.3 MMOL/L (ref 0.5–2)
D-LACTATE SERPL-SCNC: 3.3 MMOL/L (ref 0.3–2)
DEPRECATED RDW RBC AUTO: 45.5 FL (ref 37–54)
DEPRECATED RDW RBC AUTO: 46.4 FL (ref 37–54)
DEPRECATED RDW RBC AUTO: 47.3 FL (ref 37–54)
DEPRECATED RDW RBC AUTO: 47.7 FL (ref 37–54)
DEPRECATED RDW RBC AUTO: 49 FL (ref 37–54)
DEPRECATED RDW RBC AUTO: 49.4 FL (ref 37–54)
DEPRECATED RDW RBC AUTO: 50.3 FL (ref 37–54)
DEPRECATED RDW RBC AUTO: 50.3 FL (ref 37–54)
DEPRECATED RDW RBC AUTO: 50.8 FL (ref 37–54)
DEPRECATED RDW RBC AUTO: 51.2 FL (ref 37–54)
DEPRECATED RDW RBC AUTO: 51.2 FL (ref 37–54)
DEPRECATED RDW RBC AUTO: 51.6 FL (ref 37–54)
DEPRECATED RDW RBC AUTO: 52.1 FL (ref 37–54)
DEPRECATED RDW RBC AUTO: 52.1 FL (ref 37–54)
DEPRECATED RDW RBC AUTO: 52.5 FL (ref 37–54)
EGFRCR SERPLBLD CKD-EPI 2021: 11.4 ML/MIN/1.73
EGFRCR SERPLBLD CKD-EPI 2021: 11.4 ML/MIN/1.73
EGFRCR SERPLBLD CKD-EPI 2021: 11.9 ML/MIN/1.73
EGFRCR SERPLBLD CKD-EPI 2021: 12.1 ML/MIN/1.73
EGFRCR SERPLBLD CKD-EPI 2021: 12.2 ML/MIN/1.73
EGFRCR SERPLBLD CKD-EPI 2021: 12.7 ML/MIN/1.73
EGFRCR SERPLBLD CKD-EPI 2021: 12.7 ML/MIN/1.73
EGFRCR SERPLBLD CKD-EPI 2021: 13.3 ML/MIN/1.73
EGFRCR SERPLBLD CKD-EPI 2021: 13.7 ML/MIN/1.73
EGFRCR SERPLBLD CKD-EPI 2021: 14 ML/MIN/1.73
EGFRCR SERPLBLD CKD-EPI 2021: 14.1 ML/MIN/1.73
EGFRCR SERPLBLD CKD-EPI 2021: 14.6 ML/MIN/1.73
EGFRCR SERPLBLD CKD-EPI 2021: 14.7 ML/MIN/1.73
EGFRCR SERPLBLD CKD-EPI 2021: 16.1 ML/MIN/1.73
EGFRCR SERPLBLD CKD-EPI 2021: 16.5 ML/MIN/1.73
EGFRCR SERPLBLD CKD-EPI 2021: 16.6 ML/MIN/1.73
EGFRCR SERPLBLD CKD-EPI 2021: 17.6 ML/MIN/1.73
EGFRCR SERPLBLD CKD-EPI 2021: 18.3 ML/MIN/1.73
EGFRCR SERPLBLD CKD-EPI 2021: 18.9 ML/MIN/1.73
EGFRCR SERPLBLD CKD-EPI 2021: 18.9 ML/MIN/1.73
EGFRCR SERPLBLD CKD-EPI 2021: 19.1 ML/MIN/1.73
EGFRCR SERPLBLD CKD-EPI 2021: 19.1 ML/MIN/1.73
EGFRCR SERPLBLD CKD-EPI 2021: 19.5 ML/MIN/1.73
EOSINOPHIL # BLD AUTO: 0 10*3/MM3 (ref 0–0.4)
EOSINOPHIL NFR BLD AUTO: 0.1 % (ref 0.3–6.2)
EOSINOPHIL NFR BLD AUTO: 0.2 % (ref 0.3–6.2)
EOSINOPHIL NFR BLD AUTO: 0.3 % (ref 0.3–6.2)
EOSINOPHIL NFR BLD AUTO: 0.4 % (ref 0.3–6.2)
EOSINOPHIL NFR BLD AUTO: 0.4 % (ref 0.3–6.2)
EOSINOPHIL NFR BLD AUTO: 0.5 % (ref 0.3–6.2)
EOSINOPHIL NFR BLD AUTO: 0.6 % (ref 0.3–6.2)
EOSINOPHIL NFR BLD AUTO: 0.7 % (ref 0.3–6.2)
EOSINOPHIL NFR BLD AUTO: 0.8 % (ref 0.3–6.2)
EOSINOPHIL NFR BLD AUTO: 0.8 % (ref 0.3–6.2)
EOSINOPHIL NFR BLD AUTO: 0.9 % (ref 0.3–6.2)
EOSINOPHIL NFR BLD AUTO: 1 % (ref 0.3–6.2)
EOSINOPHIL NFR BLD AUTO: 1 % (ref 0.3–6.2)
EOSINOPHIL NFR BLD AUTO: 1.1 % (ref 0.3–6.2)
EOSINOPHIL NFR BLD AUTO: 1.5 % (ref 0.3–6.2)
ERYTHROCYTE [DISTWIDTH] IN BLOOD BY AUTOMATED COUNT: 14.6 % (ref 12.3–15.4)
ERYTHROCYTE [DISTWIDTH] IN BLOOD BY AUTOMATED COUNT: 14.8 % (ref 12.3–15.4)
ERYTHROCYTE [DISTWIDTH] IN BLOOD BY AUTOMATED COUNT: 14.8 % (ref 12.3–15.4)
ERYTHROCYTE [DISTWIDTH] IN BLOOD BY AUTOMATED COUNT: 14.9 % (ref 12.3–15.4)
ERYTHROCYTE [DISTWIDTH] IN BLOOD BY AUTOMATED COUNT: 15 % (ref 12.3–15.4)
ERYTHROCYTE [DISTWIDTH] IN BLOOD BY AUTOMATED COUNT: 15.1 % (ref 12.3–15.4)
ERYTHROCYTE [DISTWIDTH] IN BLOOD BY AUTOMATED COUNT: 15.1 % (ref 12.3–15.4)
ERYTHROCYTE [DISTWIDTH] IN BLOOD BY AUTOMATED COUNT: 15.2 % (ref 12.3–15.4)
ERYTHROCYTE [DISTWIDTH] IN BLOOD BY AUTOMATED COUNT: 15.2 % (ref 12.3–15.4)
ERYTHROCYTE [DISTWIDTH] IN BLOOD BY AUTOMATED COUNT: 15.3 % (ref 12.3–15.4)
ERYTHROCYTE [DISTWIDTH] IN BLOOD BY AUTOMATED COUNT: 15.4 % (ref 12.3–15.4)
ERYTHROCYTE [DISTWIDTH] IN BLOOD BY AUTOMATED COUNT: 15.5 % (ref 12.3–15.4)
ERYTHROCYTE [DISTWIDTH] IN BLOOD BY AUTOMATED COUNT: 15.6 % (ref 12.3–15.4)
ERYTHROCYTE [DISTWIDTH] IN BLOOD BY AUTOMATED COUNT: 16.1 % (ref 12.3–15.4)
ERYTHROCYTE [SEDIMENTATION RATE] IN BLOOD: 42 MM/HR (ref 0–20)
FLUAV SUBTYP SPEC NAA+PROBE: NOT DETECTED
FLUAV SUBTYP SPEC NAA+PROBE: NOT DETECTED
FLUBV RNA ISLT QL NAA+PROBE: NOT DETECTED
FLUBV RNA ISLT QL NAA+PROBE: NOT DETECTED
GEN 5 2HR TROPONIN T REFLEX: 215 NG/L
GLOBULIN UR ELPH-MCNC: 2.9 GM/DL
GLOBULIN UR ELPH-MCNC: 3.3 GM/DL
GLOBULIN UR ELPH-MCNC: 3.9 GM/DL
GLOBULIN UR ELPH-MCNC: 4 GM/DL
GLOBULIN UR ELPH-MCNC: 4.2 GM/DL
GLOBULIN UR ELPH-MCNC: 4.3 GM/DL
GLOBULIN UR ELPH-MCNC: 4.4 GM/DL
GLUCOSE BLDC GLUCOMTR-MCNC: 101 MG/DL (ref 70–105)
GLUCOSE BLDC GLUCOMTR-MCNC: 103 MG/DL (ref 70–105)
GLUCOSE BLDC GLUCOMTR-MCNC: 104 MG/DL (ref 70–105)
GLUCOSE BLDC GLUCOMTR-MCNC: 105 MG/DL (ref 70–105)
GLUCOSE BLDC GLUCOMTR-MCNC: 105 MG/DL (ref 70–105)
GLUCOSE BLDC GLUCOMTR-MCNC: 107 MG/DL (ref 70–105)
GLUCOSE BLDC GLUCOMTR-MCNC: 109 MG/DL (ref 70–105)
GLUCOSE BLDC GLUCOMTR-MCNC: 110 MG/DL (ref 70–105)
GLUCOSE BLDC GLUCOMTR-MCNC: 112 MG/DL (ref 70–105)
GLUCOSE BLDC GLUCOMTR-MCNC: 113 MG/DL (ref 70–105)
GLUCOSE BLDC GLUCOMTR-MCNC: 115 MG/DL (ref 70–105)
GLUCOSE BLDC GLUCOMTR-MCNC: 117 MG/DL (ref 70–105)
GLUCOSE BLDC GLUCOMTR-MCNC: 119 MG/DL (ref 70–105)
GLUCOSE BLDC GLUCOMTR-MCNC: 120 MG/DL (ref 70–105)
GLUCOSE BLDC GLUCOMTR-MCNC: 120 MG/DL (ref 70–105)
GLUCOSE BLDC GLUCOMTR-MCNC: 124 MG/DL (ref 70–105)
GLUCOSE BLDC GLUCOMTR-MCNC: 124 MG/DL (ref 70–105)
GLUCOSE BLDC GLUCOMTR-MCNC: 125 MG/DL (ref 70–105)
GLUCOSE BLDC GLUCOMTR-MCNC: 126 MG/DL (ref 70–105)
GLUCOSE BLDC GLUCOMTR-MCNC: 127 MG/DL (ref 70–105)
GLUCOSE BLDC GLUCOMTR-MCNC: 130 MG/DL (ref 70–105)
GLUCOSE BLDC GLUCOMTR-MCNC: 134 MG/DL (ref 70–105)
GLUCOSE BLDC GLUCOMTR-MCNC: 134 MG/DL (ref 70–105)
GLUCOSE BLDC GLUCOMTR-MCNC: 135 MG/DL (ref 70–105)
GLUCOSE BLDC GLUCOMTR-MCNC: 140 MG/DL (ref 70–105)
GLUCOSE BLDC GLUCOMTR-MCNC: 140 MG/DL (ref 70–105)
GLUCOSE BLDC GLUCOMTR-MCNC: 141 MG/DL (ref 70–105)
GLUCOSE BLDC GLUCOMTR-MCNC: 144 MG/DL (ref 70–105)
GLUCOSE BLDC GLUCOMTR-MCNC: 148 MG/DL (ref 70–105)
GLUCOSE BLDC GLUCOMTR-MCNC: 149 MG/DL (ref 70–105)
GLUCOSE BLDC GLUCOMTR-MCNC: 150 MG/DL (ref 70–105)
GLUCOSE BLDC GLUCOMTR-MCNC: 155 MG/DL (ref 70–105)
GLUCOSE BLDC GLUCOMTR-MCNC: 156 MG/DL (ref 70–105)
GLUCOSE BLDC GLUCOMTR-MCNC: 156 MG/DL (ref 70–105)
GLUCOSE BLDC GLUCOMTR-MCNC: 160 MG/DL (ref 70–105)
GLUCOSE BLDC GLUCOMTR-MCNC: 162 MG/DL (ref 70–105)
GLUCOSE BLDC GLUCOMTR-MCNC: 165 MG/DL (ref 70–105)
GLUCOSE BLDC GLUCOMTR-MCNC: 170 MG/DL (ref 70–105)
GLUCOSE BLDC GLUCOMTR-MCNC: 170 MG/DL (ref 70–105)
GLUCOSE BLDC GLUCOMTR-MCNC: 171 MG/DL (ref 70–105)
GLUCOSE BLDC GLUCOMTR-MCNC: 171 MG/DL (ref 70–105)
GLUCOSE BLDC GLUCOMTR-MCNC: 177 MG/DL (ref 70–105)
GLUCOSE BLDC GLUCOMTR-MCNC: 177 MG/DL (ref 70–105)
GLUCOSE BLDC GLUCOMTR-MCNC: 178 MG/DL (ref 70–105)
GLUCOSE BLDC GLUCOMTR-MCNC: 181 MG/DL (ref 70–105)
GLUCOSE BLDC GLUCOMTR-MCNC: 181 MG/DL (ref 70–105)
GLUCOSE BLDC GLUCOMTR-MCNC: 184 MG/DL (ref 70–105)
GLUCOSE BLDC GLUCOMTR-MCNC: 193 MG/DL (ref 70–105)
GLUCOSE BLDC GLUCOMTR-MCNC: 194 MG/DL (ref 70–105)
GLUCOSE BLDC GLUCOMTR-MCNC: 199 MG/DL (ref 70–105)
GLUCOSE BLDC GLUCOMTR-MCNC: 199 MG/DL (ref 70–105)
GLUCOSE BLDC GLUCOMTR-MCNC: 202 MG/DL (ref 70–105)
GLUCOSE BLDC GLUCOMTR-MCNC: 204 MG/DL (ref 70–105)
GLUCOSE BLDC GLUCOMTR-MCNC: 208 MG/DL (ref 70–105)
GLUCOSE BLDC GLUCOMTR-MCNC: 209 MG/DL (ref 70–105)
GLUCOSE BLDC GLUCOMTR-MCNC: 211 MG/DL (ref 70–105)
GLUCOSE BLDC GLUCOMTR-MCNC: 212 MG/DL (ref 70–105)
GLUCOSE BLDC GLUCOMTR-MCNC: 213 MG/DL (ref 70–105)
GLUCOSE BLDC GLUCOMTR-MCNC: 213 MG/DL (ref 70–105)
GLUCOSE BLDC GLUCOMTR-MCNC: 214 MG/DL (ref 70–105)
GLUCOSE BLDC GLUCOMTR-MCNC: 214 MG/DL (ref 70–105)
GLUCOSE BLDC GLUCOMTR-MCNC: 215 MG/DL (ref 70–105)
GLUCOSE BLDC GLUCOMTR-MCNC: 223 MG/DL (ref 70–105)
GLUCOSE BLDC GLUCOMTR-MCNC: 223 MG/DL (ref 70–105)
GLUCOSE BLDC GLUCOMTR-MCNC: 225 MG/DL (ref 70–105)
GLUCOSE BLDC GLUCOMTR-MCNC: 229 MG/DL (ref 70–105)
GLUCOSE BLDC GLUCOMTR-MCNC: 230 MG/DL (ref 70–105)
GLUCOSE BLDC GLUCOMTR-MCNC: 232 MG/DL (ref 70–105)
GLUCOSE BLDC GLUCOMTR-MCNC: 233 MG/DL (ref 70–105)
GLUCOSE BLDC GLUCOMTR-MCNC: 233 MG/DL (ref 70–105)
GLUCOSE BLDC GLUCOMTR-MCNC: 234 MG/DL (ref 70–105)
GLUCOSE BLDC GLUCOMTR-MCNC: 243 MG/DL (ref 70–105)
GLUCOSE BLDC GLUCOMTR-MCNC: 243 MG/DL (ref 70–105)
GLUCOSE BLDC GLUCOMTR-MCNC: 244 MG/DL (ref 70–105)
GLUCOSE BLDC GLUCOMTR-MCNC: 247 MG/DL (ref 70–105)
GLUCOSE BLDC GLUCOMTR-MCNC: 249 MG/DL (ref 70–105)
GLUCOSE BLDC GLUCOMTR-MCNC: 249 MG/DL (ref 70–105)
GLUCOSE BLDC GLUCOMTR-MCNC: 253 MG/DL (ref 70–105)
GLUCOSE BLDC GLUCOMTR-MCNC: 255 MG/DL (ref 70–105)
GLUCOSE BLDC GLUCOMTR-MCNC: 26 MG/DL (ref 70–105)
GLUCOSE BLDC GLUCOMTR-MCNC: 262 MG/DL (ref 70–105)
GLUCOSE BLDC GLUCOMTR-MCNC: 264 MG/DL (ref 70–105)
GLUCOSE BLDC GLUCOMTR-MCNC: 271 MG/DL (ref 70–105)
GLUCOSE BLDC GLUCOMTR-MCNC: 274 MG/DL (ref 70–105)
GLUCOSE BLDC GLUCOMTR-MCNC: 275 MG/DL (ref 70–105)
GLUCOSE BLDC GLUCOMTR-MCNC: 276 MG/DL (ref 70–105)
GLUCOSE BLDC GLUCOMTR-MCNC: 280 MG/DL (ref 70–105)
GLUCOSE BLDC GLUCOMTR-MCNC: 284 MG/DL (ref 70–105)
GLUCOSE BLDC GLUCOMTR-MCNC: 288 MG/DL (ref 70–105)
GLUCOSE BLDC GLUCOMTR-MCNC: 298 MG/DL (ref 70–105)
GLUCOSE BLDC GLUCOMTR-MCNC: 304 MG/DL (ref 70–105)
GLUCOSE BLDC GLUCOMTR-MCNC: 309 MG/DL (ref 70–105)
GLUCOSE BLDC GLUCOMTR-MCNC: 31 MG/DL (ref 70–105)
GLUCOSE BLDC GLUCOMTR-MCNC: 315 MG/DL (ref 70–105)
GLUCOSE BLDC GLUCOMTR-MCNC: 317 MG/DL (ref 70–105)
GLUCOSE BLDC GLUCOMTR-MCNC: 321 MG/DL (ref 70–105)
GLUCOSE BLDC GLUCOMTR-MCNC: 36 MG/DL (ref 70–105)
GLUCOSE BLDC GLUCOMTR-MCNC: 409 MG/DL (ref 70–105)
GLUCOSE BLDC GLUCOMTR-MCNC: 43 MG/DL (ref 70–105)
GLUCOSE BLDC GLUCOMTR-MCNC: 444 MG/DL (ref 70–105)
GLUCOSE BLDC GLUCOMTR-MCNC: 448 MG/DL (ref 70–105)
GLUCOSE BLDC GLUCOMTR-MCNC: 463 MG/DL (ref 70–105)
GLUCOSE BLDC GLUCOMTR-MCNC: 487 MG/DL (ref 70–105)
GLUCOSE BLDC GLUCOMTR-MCNC: 53 MG/DL (ref 70–105)
GLUCOSE BLDC GLUCOMTR-MCNC: 55 MG/DL (ref 70–105)
GLUCOSE BLDC GLUCOMTR-MCNC: 58 MG/DL (ref 70–105)
GLUCOSE BLDC GLUCOMTR-MCNC: 58 MG/DL (ref 70–105)
GLUCOSE BLDC GLUCOMTR-MCNC: 60 MG/DL (ref 70–105)
GLUCOSE BLDC GLUCOMTR-MCNC: 60 MG/DL (ref 70–105)
GLUCOSE BLDC GLUCOMTR-MCNC: 64 MG/DL (ref 70–105)
GLUCOSE BLDC GLUCOMTR-MCNC: 69 MG/DL (ref 70–105)
GLUCOSE BLDC GLUCOMTR-MCNC: 70 MG/DL (ref 70–105)
GLUCOSE BLDC GLUCOMTR-MCNC: 73 MG/DL (ref 70–105)
GLUCOSE BLDC GLUCOMTR-MCNC: 73 MG/DL (ref 70–105)
GLUCOSE BLDC GLUCOMTR-MCNC: 76 MG/DL (ref 70–105)
GLUCOSE BLDC GLUCOMTR-MCNC: 76 MG/DL (ref 70–105)
GLUCOSE BLDC GLUCOMTR-MCNC: 79 MG/DL (ref 70–105)
GLUCOSE BLDC GLUCOMTR-MCNC: 81 MG/DL (ref 70–105)
GLUCOSE BLDC GLUCOMTR-MCNC: 81 MG/DL (ref 70–105)
GLUCOSE BLDC GLUCOMTR-MCNC: 84 MG/DL (ref 70–105)
GLUCOSE BLDC GLUCOMTR-MCNC: 89 MG/DL (ref 70–105)
GLUCOSE BLDC GLUCOMTR-MCNC: 89 MG/DL (ref 70–105)
GLUCOSE BLDC GLUCOMTR-MCNC: 92 MG/DL (ref 70–105)
GLUCOSE BLDC GLUCOMTR-MCNC: 94 MG/DL (ref 70–105)
GLUCOSE BLDC GLUCOMTR-MCNC: 96 MG/DL (ref 70–105)
GLUCOSE BLDC GLUCOMTR-MCNC: 96 MG/DL (ref 70–105)
GLUCOSE BLDC GLUCOMTR-MCNC: 98 MG/DL (ref 70–105)
GLUCOSE BLDC GLUCOMTR-MCNC: >600 MG/DL (ref 70–105)
GLUCOSE SERPL-MCNC: 108 MG/DL (ref 65–99)
GLUCOSE SERPL-MCNC: 115 MG/DL (ref 65–99)
GLUCOSE SERPL-MCNC: 119 MG/DL (ref 65–99)
GLUCOSE SERPL-MCNC: 124 MG/DL (ref 65–99)
GLUCOSE SERPL-MCNC: 172 MG/DL (ref 65–99)
GLUCOSE SERPL-MCNC: 180 MG/DL (ref 65–99)
GLUCOSE SERPL-MCNC: 180 MG/DL (ref 65–99)
GLUCOSE SERPL-MCNC: 185 MG/DL (ref 65–99)
GLUCOSE SERPL-MCNC: 196 MG/DL (ref 65–99)
GLUCOSE SERPL-MCNC: 198 MG/DL (ref 65–99)
GLUCOSE SERPL-MCNC: 210 MG/DL (ref 65–99)
GLUCOSE SERPL-MCNC: 211 MG/DL (ref 65–99)
GLUCOSE SERPL-MCNC: 233 MG/DL (ref 65–99)
GLUCOSE SERPL-MCNC: 243 MG/DL (ref 65–99)
GLUCOSE SERPL-MCNC: 251 MG/DL (ref 65–99)
GLUCOSE SERPL-MCNC: 309 MG/DL (ref 65–99)
GLUCOSE SERPL-MCNC: 310 MG/DL (ref 65–99)
GLUCOSE SERPL-MCNC: 43 MG/DL (ref 65–99)
GLUCOSE SERPL-MCNC: 582 MG/DL (ref 65–99)
GLUCOSE SERPL-MCNC: 83 MG/DL (ref 65–99)
GLUCOSE SERPL-MCNC: 96 MG/DL (ref 65–99)
GLUCOSE UR STRIP-MCNC: NEGATIVE MG/DL
GLUCOSE UR STRIP-MCNC: NEGATIVE MG/DL
GRAN CASTS URNS QL MICRO: ABNORMAL /LPF
HADV DNA SPEC NAA+PROBE: NOT DETECTED
HADV DNA SPEC NAA+PROBE: NOT DETECTED
HBA1C MFR BLD: 7.4 % (ref 3.5–5.6)
HBV CORE IGM SERPL QL IA: NORMAL
HBV SURFACE AB SER RIA-ACNC: NORMAL
HBV SURFACE AG SERPL QL IA: NORMAL
HBV SURFACE AG SERPL QL IA: NORMAL
HCOV 229E RNA SPEC QL NAA+PROBE: NOT DETECTED
HCOV 229E RNA SPEC QL NAA+PROBE: NOT DETECTED
HCOV HKU1 RNA SPEC QL NAA+PROBE: NOT DETECTED
HCOV HKU1 RNA SPEC QL NAA+PROBE: NOT DETECTED
HCOV NL63 RNA SPEC QL NAA+PROBE: NOT DETECTED
HCOV NL63 RNA SPEC QL NAA+PROBE: NOT DETECTED
HCOV OC43 RNA SPEC QL NAA+PROBE: NOT DETECTED
HCOV OC43 RNA SPEC QL NAA+PROBE: NOT DETECTED
HCT VFR BLD AUTO: 22.6 % (ref 37.5–51)
HCT VFR BLD AUTO: 23.1 % (ref 37.5–51)
HCT VFR BLD AUTO: 24 % (ref 37.5–51)
HCT VFR BLD AUTO: 24 % (ref 37.5–51)
HCT VFR BLD AUTO: 24.5 % (ref 37.5–51)
HCT VFR BLD AUTO: 24.7 % (ref 37.5–51)
HCT VFR BLD AUTO: 25 % (ref 37.5–51)
HCT VFR BLD AUTO: 25.1 % (ref 37.5–51)
HCT VFR BLD AUTO: 25.3 % (ref 37.5–51)
HCT VFR BLD AUTO: 25.6 % (ref 37.5–51)
HCT VFR BLD AUTO: 25.7 % (ref 37.5–51)
HCT VFR BLD AUTO: 26 % (ref 37.5–51)
HCT VFR BLD AUTO: 26.5 % (ref 37.5–51)
HCT VFR BLD AUTO: 27.7 % (ref 37.5–51)
HCT VFR BLD AUTO: 27.9 % (ref 37.5–51)
HCT VFR BLD AUTO: 28.2 % (ref 37.5–51)
HCT VFR BLD AUTO: 28.7 % (ref 37.5–51)
HCT VFR BLD AUTO: 28.9 % (ref 37.5–51)
HCT VFR BLD AUTO: 29.2 % (ref 37.5–51)
HDLC SERPL-MCNC: 75 MG/DL (ref 40–60)
HEMOCCULT STL QL IA: POSITIVE
HGB BLD-MCNC: 7.3 G/DL (ref 13–17.7)
HGB BLD-MCNC: 7.3 G/DL (ref 13–17.7)
HGB BLD-MCNC: 7.7 G/DL (ref 13–17.7)
HGB BLD-MCNC: 7.8 G/DL (ref 13–17.7)
HGB BLD-MCNC: 8 G/DL (ref 13–17.7)
HGB BLD-MCNC: 8.3 G/DL (ref 13–17.7)
HGB BLD-MCNC: 8.4 G/DL (ref 13–17.7)
HGB BLD-MCNC: 8.6 G/DL (ref 13–17.7)
HGB BLD-MCNC: 8.7 G/DL (ref 13–17.7)
HGB BLD-MCNC: 9 G/DL (ref 13–17.7)
HGB BLD-MCNC: 9.1 G/DL (ref 13–17.7)
HGB BLD-MCNC: 9.3 G/DL (ref 13–17.7)
HGB UR QL STRIP.AUTO: ABNORMAL
HGB UR QL STRIP.AUTO: ABNORMAL
HMPV RNA NPH QL NAA+NON-PROBE: NOT DETECTED
HMPV RNA NPH QL NAA+NON-PROBE: NOT DETECTED
HOLD SPECIMEN: NORMAL
HPIV1 RNA ISLT QL NAA+PROBE: NOT DETECTED
HPIV1 RNA ISLT QL NAA+PROBE: NOT DETECTED
HPIV2 RNA SPEC QL NAA+PROBE: NOT DETECTED
HPIV2 RNA SPEC QL NAA+PROBE: NOT DETECTED
HPIV3 RNA NPH QL NAA+PROBE: NOT DETECTED
HPIV3 RNA NPH QL NAA+PROBE: NOT DETECTED
HPIV4 P GENE NPH QL NAA+PROBE: NOT DETECTED
HPIV4 P GENE NPH QL NAA+PROBE: NOT DETECTED
HYALINE CASTS UR QL AUTO: ABNORMAL /LPF
INR PPP: 1.26 (ref 0.93–1.1)
INR PPP: 1.81 (ref 0.93–1.1)
IRON 24H UR-MRATE: 19 MCG/DL (ref 59–158)
IRON 24H UR-MRATE: 21 MCG/DL (ref 59–158)
IRON SATN MFR SERPL: 10 % (ref 20–50)
IRON SATN MFR SERPL: 11 % (ref 20–50)
KETONES UR QL STRIP: ABNORMAL
KETONES UR QL STRIP: NEGATIVE
LDLC SERPL CALC-MCNC: 53 MG/DL (ref 0–100)
LDLC/HDLC SERPL: 0.74 {RATIO}
LEUKOCYTE ESTERASE UR QL STRIP.AUTO: ABNORMAL
LEUKOCYTE ESTERASE UR QL STRIP.AUTO: ABNORMAL
LIPASE SERPL-CCNC: 5 U/L (ref 13–60)
LYMPHOCYTES # BLD AUTO: 0.1 10*3/MM3 (ref 0.7–3.1)
LYMPHOCYTES # BLD AUTO: 0.1 10*3/MM3 (ref 0.7–3.1)
LYMPHOCYTES # BLD AUTO: 0.2 10*3/MM3 (ref 0.7–3.1)
LYMPHOCYTES # BLD AUTO: 0.3 10*3/MM3 (ref 0.7–3.1)
LYMPHOCYTES # BLD AUTO: 0.4 10*3/MM3 (ref 0.7–3.1)
LYMPHOCYTES NFR BLD AUTO: 1.3 % (ref 19.6–45.3)
LYMPHOCYTES NFR BLD AUTO: 10.4 % (ref 19.6–45.3)
LYMPHOCYTES NFR BLD AUTO: 10.8 % (ref 19.6–45.3)
LYMPHOCYTES NFR BLD AUTO: 2.1 % (ref 19.6–45.3)
LYMPHOCYTES NFR BLD AUTO: 2.1 % (ref 19.6–45.3)
LYMPHOCYTES NFR BLD AUTO: 4.1 % (ref 19.6–45.3)
LYMPHOCYTES NFR BLD AUTO: 5.6 % (ref 19.6–45.3)
LYMPHOCYTES NFR BLD AUTO: 5.7 % (ref 19.6–45.3)
LYMPHOCYTES NFR BLD AUTO: 5.8 % (ref 19.6–45.3)
LYMPHOCYTES NFR BLD AUTO: 6.1 % (ref 19.6–45.3)
LYMPHOCYTES NFR BLD AUTO: 6.1 % (ref 19.6–45.3)
LYMPHOCYTES NFR BLD AUTO: 6.4 % (ref 19.6–45.3)
LYMPHOCYTES NFR BLD AUTO: 6.7 % (ref 19.6–45.3)
LYMPHOCYTES NFR BLD AUTO: 6.8 % (ref 19.6–45.3)
LYMPHOCYTES NFR BLD AUTO: 8.1 % (ref 19.6–45.3)
LYMPHOCYTES NFR BLD AUTO: 8.1 % (ref 19.6–45.3)
LYMPHOCYTES NFR BLD AUTO: 8.3 % (ref 19.6–45.3)
LYMPHOCYTES NFR BLD AUTO: 8.4 % (ref 19.6–45.3)
LYMPHOCYTES NFR BLD AUTO: 9.1 % (ref 19.6–45.3)
M PNEUMO IGG SER IA-ACNC: NOT DETECTED
M PNEUMO IGG SER IA-ACNC: NOT DETECTED
MAGNESIUM SERPL-MCNC: 1.5 MG/DL (ref 1.6–2.4)
MAGNESIUM SERPL-MCNC: 1.6 MG/DL (ref 1.6–2.4)
MAGNESIUM SERPL-MCNC: 1.7 MG/DL (ref 1.6–2.4)
MAGNESIUM SERPL-MCNC: 1.8 MG/DL (ref 1.6–2.4)
MAGNESIUM SERPL-MCNC: 1.9 MG/DL (ref 1.6–2.4)
MAGNESIUM SERPL-MCNC: 1.9 MG/DL (ref 1.6–2.4)
MAGNESIUM SERPL-MCNC: 2 MG/DL (ref 1.6–2.4)
MAXIMAL PREDICTED HEART RATE: 145 BPM
MCH RBC QN AUTO: 28.2 PG (ref 26.6–33)
MCH RBC QN AUTO: 28.2 PG (ref 26.6–33)
MCH RBC QN AUTO: 28.8 PG (ref 26.6–33)
MCH RBC QN AUTO: 28.8 PG (ref 26.6–33)
MCH RBC QN AUTO: 28.9 PG (ref 26.6–33)
MCH RBC QN AUTO: 28.9 PG (ref 26.6–33)
MCH RBC QN AUTO: 29 PG (ref 26.6–33)
MCH RBC QN AUTO: 29 PG (ref 26.6–33)
MCH RBC QN AUTO: 29.1 PG (ref 26.6–33)
MCH RBC QN AUTO: 29.2 PG (ref 26.6–33)
MCH RBC QN AUTO: 29.2 PG (ref 26.6–33)
MCH RBC QN AUTO: 29.3 PG (ref 26.6–33)
MCH RBC QN AUTO: 29.4 PG (ref 26.6–33)
MCH RBC QN AUTO: 29.5 PG (ref 26.6–33)
MCH RBC QN AUTO: 29.5 PG (ref 26.6–33)
MCH RBC QN AUTO: 30 PG (ref 26.6–33)
MCH RBC QN AUTO: 30.1 PG (ref 26.6–33)
MCHC RBC AUTO-ENTMCNC: 31.4 G/DL (ref 31.5–35.7)
MCHC RBC AUTO-ENTMCNC: 31.5 G/DL (ref 31.5–35.7)
MCHC RBC AUTO-ENTMCNC: 31.5 G/DL (ref 31.5–35.7)
MCHC RBC AUTO-ENTMCNC: 31.8 G/DL (ref 31.5–35.7)
MCHC RBC AUTO-ENTMCNC: 32 G/DL (ref 31.5–35.7)
MCHC RBC AUTO-ENTMCNC: 32.1 G/DL (ref 31.5–35.7)
MCHC RBC AUTO-ENTMCNC: 32.2 G/DL (ref 31.5–35.7)
MCHC RBC AUTO-ENTMCNC: 32.3 G/DL (ref 31.5–35.7)
MCHC RBC AUTO-ENTMCNC: 32.3 G/DL (ref 31.5–35.7)
MCHC RBC AUTO-ENTMCNC: 32.4 G/DL (ref 31.5–35.7)
MCHC RBC AUTO-ENTMCNC: 32.4 G/DL (ref 31.5–35.7)
MCHC RBC AUTO-ENTMCNC: 32.5 G/DL (ref 31.5–35.7)
MCHC RBC AUTO-ENTMCNC: 32.5 G/DL (ref 31.5–35.7)
MCHC RBC AUTO-ENTMCNC: 32.6 G/DL (ref 31.5–35.7)
MCHC RBC AUTO-ENTMCNC: 33.1 G/DL (ref 31.5–35.7)
MCHC RBC AUTO-ENTMCNC: 33.3 G/DL (ref 31.5–35.7)
MCHC RBC AUTO-ENTMCNC: 33.3 G/DL (ref 31.5–35.7)
MCHC RBC AUTO-ENTMCNC: 33.4 G/DL (ref 31.5–35.7)
MCHC RBC AUTO-ENTMCNC: 33.5 G/DL (ref 31.5–35.7)
MCV RBC AUTO: 88.2 FL (ref 79–97)
MCV RBC AUTO: 88.4 FL (ref 79–97)
MCV RBC AUTO: 88.8 FL (ref 79–97)
MCV RBC AUTO: 89 FL (ref 79–97)
MCV RBC AUTO: 89.5 FL (ref 79–97)
MCV RBC AUTO: 89.5 FL (ref 79–97)
MCV RBC AUTO: 89.7 FL (ref 79–97)
MCV RBC AUTO: 89.9 FL (ref 79–97)
MCV RBC AUTO: 90 FL (ref 79–97)
MCV RBC AUTO: 90.1 FL (ref 79–97)
MCV RBC AUTO: 90.2 FL (ref 79–97)
MCV RBC AUTO: 90.4 FL (ref 79–97)
MCV RBC AUTO: 90.7 FL (ref 79–97)
MCV RBC AUTO: 91.7 FL (ref 79–97)
MCV RBC AUTO: 91.9 FL (ref 79–97)
MCV RBC AUTO: 92.1 FL (ref 79–97)
MCV RBC AUTO: 92.9 FL (ref 79–97)
MONOCYTES # BLD AUTO: 0.1 10*3/MM3 (ref 0.1–0.9)
MONOCYTES # BLD AUTO: 0.2 10*3/MM3 (ref 0.1–0.9)
MONOCYTES # BLD AUTO: 0.3 10*3/MM3 (ref 0.1–0.9)
MONOCYTES # BLD AUTO: 0.4 10*3/MM3 (ref 0.1–0.9)
MONOCYTES # BLD AUTO: 0.4 10*3/MM3 (ref 0.1–0.9)
MONOCYTES NFR BLD AUTO: 3.1 % (ref 5–12)
MONOCYTES NFR BLD AUTO: 3.2 % (ref 5–12)
MONOCYTES NFR BLD AUTO: 3.3 % (ref 5–12)
MONOCYTES NFR BLD AUTO: 3.8 % (ref 5–12)
MONOCYTES NFR BLD AUTO: 4 % (ref 5–12)
MONOCYTES NFR BLD AUTO: 4.6 % (ref 5–12)
MONOCYTES NFR BLD AUTO: 4.8 % (ref 5–12)
MONOCYTES NFR BLD AUTO: 5.2 % (ref 5–12)
MONOCYTES NFR BLD AUTO: 5.4 % (ref 5–12)
MONOCYTES NFR BLD AUTO: 5.4 % (ref 5–12)
MONOCYTES NFR BLD AUTO: 5.5 % (ref 5–12)
MONOCYTES NFR BLD AUTO: 5.6 % (ref 5–12)
MONOCYTES NFR BLD AUTO: 5.6 % (ref 5–12)
MONOCYTES NFR BLD AUTO: 6.1 % (ref 5–12)
MONOCYTES NFR BLD AUTO: 6.8 % (ref 5–12)
MONOCYTES NFR BLD AUTO: 6.9 % (ref 5–12)
MONOCYTES NFR BLD AUTO: 7.4 % (ref 5–12)
MONOCYTES NFR BLD AUTO: 7.5 % (ref 5–12)
MONOCYTES NFR BLD AUTO: 7.7 % (ref 5–12)
NEUTROPHILS NFR BLD AUTO: 2.6 10*3/MM3 (ref 1.7–7)
NEUTROPHILS NFR BLD AUTO: 2.7 10*3/MM3 (ref 1.7–7)
NEUTROPHILS NFR BLD AUTO: 3 10*3/MM3 (ref 1.7–7)
NEUTROPHILS NFR BLD AUTO: 3 10*3/MM3 (ref 1.7–7)
NEUTROPHILS NFR BLD AUTO: 3.3 10*3/MM3 (ref 1.7–7)
NEUTROPHILS NFR BLD AUTO: 3.7 10*3/MM3 (ref 1.7–7)
NEUTROPHILS NFR BLD AUTO: 3.9 10*3/MM3 (ref 1.7–7)
NEUTROPHILS NFR BLD AUTO: 4 10*3/MM3 (ref 1.7–7)
NEUTROPHILS NFR BLD AUTO: 4.1 10*3/MM3 (ref 1.7–7)
NEUTROPHILS NFR BLD AUTO: 4.4 10*3/MM3 (ref 1.7–7)
NEUTROPHILS NFR BLD AUTO: 5.2 10*3/MM3 (ref 1.7–7)
NEUTROPHILS NFR BLD AUTO: 5.5 10*3/MM3 (ref 1.7–7)
NEUTROPHILS NFR BLD AUTO: 7 10*3/MM3 (ref 1.7–7)
NEUTROPHILS NFR BLD AUTO: 7.6 10*3/MM3 (ref 1.7–7)
NEUTROPHILS NFR BLD AUTO: 80.8 % (ref 42.7–76)
NEUTROPHILS NFR BLD AUTO: 81.6 % (ref 42.7–76)
NEUTROPHILS NFR BLD AUTO: 82.4 % (ref 42.7–76)
NEUTROPHILS NFR BLD AUTO: 82.7 % (ref 42.7–76)
NEUTROPHILS NFR BLD AUTO: 83.1 % (ref 42.7–76)
NEUTROPHILS NFR BLD AUTO: 85.1 % (ref 42.7–76)
NEUTROPHILS NFR BLD AUTO: 85.4 % (ref 42.7–76)
NEUTROPHILS NFR BLD AUTO: 87.1 % (ref 42.7–76)
NEUTROPHILS NFR BLD AUTO: 87.2 % (ref 42.7–76)
NEUTROPHILS NFR BLD AUTO: 87.3 % (ref 42.7–76)
NEUTROPHILS NFR BLD AUTO: 87.3 % (ref 42.7–76)
NEUTROPHILS NFR BLD AUTO: 87.4 % (ref 42.7–76)
NEUTROPHILS NFR BLD AUTO: 87.9 % (ref 42.7–76)
NEUTROPHILS NFR BLD AUTO: 88.1 % (ref 42.7–76)
NEUTROPHILS NFR BLD AUTO: 89.1 % (ref 42.7–76)
NEUTROPHILS NFR BLD AUTO: 89.8 % (ref 42.7–76)
NEUTROPHILS NFR BLD AUTO: 91 % (ref 42.7–76)
NEUTROPHILS NFR BLD AUTO: 93.5 % (ref 42.7–76)
NEUTROPHILS NFR BLD AUTO: 95 % (ref 42.7–76)
NITRITE UR QL STRIP: NEGATIVE
NITRITE UR QL STRIP: NEGATIVE
NRBC BLD AUTO-RTO: 0 /100 WBC (ref 0–0.2)
NRBC BLD AUTO-RTO: 0.1 /100 WBC (ref 0–0.2)
NRBC BLD AUTO-RTO: 0.2 /100 WBC (ref 0–0.2)
NRBC BLD AUTO-RTO: 0.3 /100 WBC (ref 0–0.2)
NRBC BLD AUTO-RTO: 0.4 /100 WBC (ref 0–0.2)
NRBC BLD AUTO-RTO: 0.5 /100 WBC (ref 0–0.2)
NT-PROBNP SERPL-MCNC: ABNORMAL PG/ML (ref 0–1800)
NT-PROBNP SERPL-MCNC: ABNORMAL PG/ML (ref 0–1800)
PH UR STRIP.AUTO: <=5 [PH] (ref 5–8)
PH UR STRIP.AUTO: <=5 [PH] (ref 5–8)
PHOSPHATE SERPL-MCNC: 4 MG/DL (ref 2.5–4.5)
PHOSPHATE SERPL-MCNC: 4.8 MG/DL (ref 2.5–4.5)
PHOSPHATE SERPL-MCNC: 5 MG/DL (ref 2.5–4.5)
PHOSPHATE SERPL-MCNC: 5.2 MG/DL (ref 2.5–4.5)
PHOSPHATE SERPL-MCNC: 5.4 MG/DL (ref 2.5–4.5)
PHOSPHATE SERPL-MCNC: 5.6 MG/DL (ref 2.5–4.5)
PHOSPHATE SERPL-MCNC: 5.6 MG/DL (ref 2.5–4.5)
PHOSPHATE SERPL-MCNC: 5.7 MG/DL (ref 2.5–4.5)
PHOSPHATE SERPL-MCNC: 6 MG/DL (ref 2.5–4.5)
PHOSPHATE SERPL-MCNC: 6.5 MG/DL (ref 2.5–4.5)
PHOSPHATE SERPL-MCNC: 6.5 MG/DL (ref 2.5–4.5)
PHOSPHATE SERPL-MCNC: 6.7 MG/DL (ref 2.5–4.5)
PHOSPHATE SERPL-MCNC: 6.9 MG/DL (ref 2.5–4.5)
PHOSPHATE SERPL-MCNC: 7.2 MG/DL (ref 2.5–4.5)
PHOSPHATE SERPL-MCNC: 7.4 MG/DL (ref 2.5–4.5)
PHOSPHATE SERPL-MCNC: 8 MG/DL (ref 2.5–4.5)
PLATELET # BLD AUTO: 148 10*3/MM3 (ref 140–450)
PLATELET # BLD AUTO: 168 10*3/MM3 (ref 140–450)
PLATELET # BLD AUTO: 171 10*3/MM3 (ref 140–450)
PLATELET # BLD AUTO: 285 10*3/MM3 (ref 140–450)
PLATELET # BLD AUTO: 296 10*3/MM3 (ref 140–450)
PLATELET # BLD AUTO: 299 10*3/MM3 (ref 140–450)
PLATELET # BLD AUTO: 306 10*3/MM3 (ref 140–450)
PLATELET # BLD AUTO: 316 10*3/MM3 (ref 140–450)
PLATELET # BLD AUTO: 316 10*3/MM3 (ref 140–450)
PLATELET # BLD AUTO: 323 10*3/MM3 (ref 140–450)
PLATELET # BLD AUTO: 323 10*3/MM3 (ref 140–450)
PLATELET # BLD AUTO: 336 10*3/MM3 (ref 140–450)
PLATELET # BLD AUTO: 338 10*3/MM3 (ref 140–450)
PLATELET # BLD AUTO: 342 10*3/MM3 (ref 140–450)
PLATELET # BLD AUTO: 344 10*3/MM3 (ref 140–450)
PLATELET # BLD AUTO: 351 10*3/MM3 (ref 140–450)
PLATELET # BLD AUTO: 360 10*3/MM3 (ref 140–450)
PMV BLD AUTO: 10 FL (ref 6–12)
PMV BLD AUTO: 10.1 FL (ref 6–12)
PMV BLD AUTO: 10.1 FL (ref 6–12)
PMV BLD AUTO: 10.2 FL (ref 6–12)
PMV BLD AUTO: 10.3 FL (ref 6–12)
PMV BLD AUTO: 9.1 FL (ref 6–12)
PMV BLD AUTO: 9.2 FL (ref 6–12)
PMV BLD AUTO: 9.3 FL (ref 6–12)
PMV BLD AUTO: 9.3 FL (ref 6–12)
PMV BLD AUTO: 9.4 FL (ref 6–12)
PMV BLD AUTO: 9.5 FL (ref 6–12)
PMV BLD AUTO: 9.5 FL (ref 6–12)
PMV BLD AUTO: 9.6 FL (ref 6–12)
PMV BLD AUTO: 9.7 FL (ref 6–12)
PMV BLD AUTO: 9.7 FL (ref 6–12)
PMV BLD AUTO: 9.8 FL (ref 6–12)
PMV BLD AUTO: 9.8 FL (ref 6–12)
POTASSIUM SERPL-SCNC: 3.6 MMOL/L (ref 3.5–5.2)
POTASSIUM SERPL-SCNC: 4.3 MMOL/L (ref 3.5–5.2)
POTASSIUM SERPL-SCNC: 4.4 MMOL/L (ref 3.5–5.2)
POTASSIUM SERPL-SCNC: 4.5 MMOL/L (ref 3.5–5.2)
POTASSIUM SERPL-SCNC: 4.5 MMOL/L (ref 3.5–5.2)
POTASSIUM SERPL-SCNC: 4.6 MMOL/L (ref 3.5–5.2)
POTASSIUM SERPL-SCNC: 4.6 MMOL/L (ref 3.5–5.2)
POTASSIUM SERPL-SCNC: 4.7 MMOL/L (ref 3.5–5.2)
POTASSIUM SERPL-SCNC: 4.8 MMOL/L (ref 3.5–5.2)
POTASSIUM SERPL-SCNC: 4.9 MMOL/L (ref 3.5–5.2)
POTASSIUM SERPL-SCNC: 4.9 MMOL/L (ref 3.5–5.2)
POTASSIUM SERPL-SCNC: 5 MMOL/L (ref 3.5–5.2)
POTASSIUM SERPL-SCNC: 5.1 MMOL/L (ref 3.5–5.2)
POTASSIUM SERPL-SCNC: 5.2 MMOL/L (ref 3.5–5.2)
POTASSIUM SERPL-SCNC: 5.2 MMOL/L (ref 3.5–5.2)
POTASSIUM SERPL-SCNC: 5.3 MMOL/L (ref 3.5–5.2)
POTASSIUM SERPL-SCNC: 5.3 MMOL/L (ref 3.5–5.2)
POTASSIUM SERPL-SCNC: 5.7 MMOL/L (ref 3.5–5.2)
PROCALCITONIN SERPL-MCNC: 0.45 NG/ML (ref 0–0.25)
PROCALCITONIN SERPL-MCNC: 51.71 NG/ML (ref 0–0.25)
PROT ?TM UR-MCNC: 86.2 MG/DL
PROT SERPL-MCNC: 5.6 G/DL (ref 6–8.5)
PROT SERPL-MCNC: 6 G/DL (ref 6–8.5)
PROT SERPL-MCNC: 6.2 G/DL (ref 6–8.5)
PROT SERPL-MCNC: 6.3 G/DL (ref 6–8.5)
PROT SERPL-MCNC: 6.4 G/DL (ref 6–8.5)
PROT SERPL-MCNC: 6.6 G/DL (ref 6–8.5)
PROT SERPL-MCNC: 6.7 G/DL (ref 6–8.5)
PROT SERPL-MCNC: 6.8 G/DL (ref 6–8.5)
PROT SERPL-MCNC: 6.8 G/DL (ref 6–8.5)
PROT SERPL-MCNC: 6.9 G/DL (ref 6–8.5)
PROT SERPL-MCNC: 7 G/DL (ref 6–8.5)
PROT SERPL-MCNC: 7 G/DL (ref 6–8.5)
PROT SERPL-MCNC: 7.1 G/DL (ref 6–8.5)
PROT SERPL-MCNC: 7.3 G/DL (ref 6–8.5)
PROT UR QL STRIP: ABNORMAL
PROT UR QL STRIP: ABNORMAL
PROT/CREAT UR: 1267.6 MG/G CREA (ref 0–200)
PROTHROMBIN TIME: 12.8 SECONDS (ref 9.6–11.7)
PROTHROMBIN TIME: 18 SECONDS (ref 9.6–11.7)
QT INTERVAL: 443 MS
QT INTERVAL: 481 MS
RBC # BLD AUTO: 2.51 10*6/MM3 (ref 4.14–5.8)
RBC # BLD AUTO: 2.57 10*6/MM3 (ref 4.14–5.8)
RBC # BLD AUTO: 2.67 10*6/MM3 (ref 4.14–5.8)
RBC # BLD AUTO: 2.72 10*6/MM3 (ref 4.14–5.8)
RBC # BLD AUTO: 2.74 10*6/MM3 (ref 4.14–5.8)
RBC # BLD AUTO: 2.75 10*6/MM3 (ref 4.14–5.8)
RBC # BLD AUTO: 2.78 10*6/MM3 (ref 4.14–5.8)
RBC # BLD AUTO: 2.78 10*6/MM3 (ref 4.14–5.8)
RBC # BLD AUTO: 2.8 10*6/MM3 (ref 4.14–5.8)
RBC # BLD AUTO: 2.84 10*6/MM3 (ref 4.14–5.8)
RBC # BLD AUTO: 2.85 10*6/MM3 (ref 4.14–5.8)
RBC # BLD AUTO: 2.89 10*6/MM3 (ref 4.14–5.8)
RBC # BLD AUTO: 2.9 10*6/MM3 (ref 4.14–5.8)
RBC # BLD AUTO: 3.08 10*6/MM3 (ref 4.14–5.8)
RBC # BLD AUTO: 3.11 10*6/MM3 (ref 4.14–5.8)
RBC # BLD AUTO: 3.12 10*6/MM3 (ref 4.14–5.8)
RBC # BLD AUTO: 3.15 10*6/MM3 (ref 4.14–5.8)
RBC # BLD AUTO: 3.15 10*6/MM3 (ref 4.14–5.8)
RBC # BLD AUTO: 3.22 10*6/MM3 (ref 4.14–5.8)
RBC # UR STRIP: ABNORMAL /HPF
REF LAB TEST METHOD: ABNORMAL
RH BLD: POSITIVE
RHINOVIRUS RNA SPEC NAA+PROBE: NOT DETECTED
RHINOVIRUS RNA SPEC NAA+PROBE: NOT DETECTED
RSV RNA NPH QL NAA+NON-PROBE: NOT DETECTED
RSV RNA NPH QL NAA+NON-PROBE: NOT DETECTED
SARS-COV-2 RNA NPH QL NAA+NON-PROBE: NOT DETECTED
SARS-COV-2 RNA NPH QL NAA+NON-PROBE: NOT DETECTED
SINUS: 2.8 CM
SODIUM SERPL-SCNC: 128 MMOL/L (ref 136–145)
SODIUM SERPL-SCNC: 129 MMOL/L (ref 136–145)
SODIUM SERPL-SCNC: 130 MMOL/L (ref 136–145)
SODIUM SERPL-SCNC: 131 MMOL/L (ref 136–145)
SODIUM SERPL-SCNC: 132 MMOL/L (ref 136–145)
SODIUM SERPL-SCNC: 133 MMOL/L (ref 136–145)
SODIUM SERPL-SCNC: 134 MMOL/L (ref 136–145)
SODIUM SERPL-SCNC: 134 MMOL/L (ref 136–145)
SODIUM SERPL-SCNC: 135 MMOL/L (ref 136–145)
SODIUM UR-SCNC: <20 MMOL/L
SP GR UR STRIP: 1.01 (ref 1–1.03)
SP GR UR STRIP: 1.02 (ref 1–1.03)
SQUAMOUS #/AREA URNS HPF: ABNORMAL /HPF
STRESS TARGET HR: 123 BPM
T&S EXPIRATION DATE: NORMAL
T4 FREE SERPL-MCNC: 1.21 NG/DL (ref 0.93–1.7)
T4 FREE SERPL-MCNC: 1.33 NG/DL (ref 0.93–1.7)
THYROPEROXIDASE AB SERPL-ACNC: 11 IU/ML (ref 0–34)
TIBC SERPL-MCNC: 168 MCG/DL (ref 298–536)
TIBC SERPL-MCNC: 201 MCG/DL (ref 298–536)
TRANSFERRIN SERPL-MCNC: 113 MG/DL (ref 200–360)
TRANSFERRIN SERPL-MCNC: 135 MG/DL (ref 200–360)
TRIGL SERPL-MCNC: 26 MG/DL (ref 0–150)
TROPONIN T DELTA: 6 NG/L
TROPONIN T SERPL HS-MCNC: 169 NG/L
TROPONIN T SERPL HS-MCNC: 209 NG/L
TSH SERPL DL<=0.05 MIU/L-ACNC: 5.32 UIU/ML (ref 0.27–4.2)
TSH SERPL DL<=0.05 MIU/L-ACNC: 5.35 UIU/ML (ref 0.27–4.2)
TSH SERPL DL<=0.05 MIU/L-ACNC: 6.47 UIU/ML (ref 0.27–4.2)
UNIT  ABO: NORMAL
UNIT  RH: NORMAL
UROBILINOGEN UR QL STRIP: ABNORMAL
UROBILINOGEN UR QL STRIP: ABNORMAL
VLDLC SERPL-MCNC: 8 MG/DL (ref 5–40)
WBC # UR STRIP: ABNORMAL /HPF
WBC NRBC COR # BLD: 3.2 10*3/MM3 (ref 3.4–10.8)
WBC NRBC COR # BLD: 3.3 10*3/MM3 (ref 3.4–10.8)
WBC NRBC COR # BLD: 3.6 10*3/MM3 (ref 3.4–10.8)
WBC NRBC COR # BLD: 3.6 10*3/MM3 (ref 3.4–10.8)
WBC NRBC COR # BLD: 4 10*3/MM3 (ref 3.4–10.8)
WBC NRBC COR # BLD: 4.2 10*3/MM3 (ref 3.4–10.8)
WBC NRBC COR # BLD: 4.2 10*3/MM3 (ref 3.4–10.8)
WBC NRBC COR # BLD: 4.3 10*3/MM3 (ref 3.4–10.8)
WBC NRBC COR # BLD: 4.5 10*3/MM3 (ref 3.4–10.8)
WBC NRBC COR # BLD: 4.6 10*3/MM3 (ref 3.4–10.8)
WBC NRBC COR # BLD: 4.8 10*3/MM3 (ref 3.4–10.8)
WBC NRBC COR # BLD: 5 10*3/MM3 (ref 3.4–10.8)
WBC NRBC COR # BLD: 5 10*3/MM3 (ref 3.4–10.8)
WBC NRBC COR # BLD: 5.1 10*3/MM3 (ref 3.4–10.8)
WBC NRBC COR # BLD: 5.2 10*3/MM3 (ref 3.4–10.8)
WBC NRBC COR # BLD: 5.9 10*3/MM3 (ref 3.4–10.8)
WBC NRBC COR # BLD: 6.1 10*3/MM3 (ref 3.4–10.8)
WBC NRBC COR # BLD: 7.4 10*3/MM3 (ref 3.4–10.8)
WBC NRBC COR # BLD: 8.2 10*3/MM3 (ref 3.4–10.8)
WHOLE BLOOD HOLD SPECIMEN: NORMAL
YEAST URNS QL MICRO: ABNORMAL /HPF

## 2023-01-01 PROCEDURE — 99231 SBSQ HOSP IP/OBS SF/LOW 25: CPT | Performed by: INTERNAL MEDICINE

## 2023-01-01 PROCEDURE — 85025 COMPLETE CBC W/AUTO DIFF WBC: CPT | Performed by: NURSE PRACTITIONER

## 2023-01-01 PROCEDURE — 85025 COMPLETE CBC W/AUTO DIFF WBC: CPT | Performed by: INTERNAL MEDICINE

## 2023-01-01 PROCEDURE — 83735 ASSAY OF MAGNESIUM: CPT | Performed by: INTERNAL MEDICINE

## 2023-01-01 PROCEDURE — C1769 GUIDE WIRE: HCPCS

## 2023-01-01 PROCEDURE — 94799 UNLISTED PULMONARY SVC/PX: CPT

## 2023-01-01 PROCEDURE — 82962 GLUCOSE BLOOD TEST: CPT

## 2023-01-01 PROCEDURE — 25010000002 CEFTRIAXONE PER 250 MG: Performed by: EMERGENCY MEDICINE

## 2023-01-01 PROCEDURE — 87040 BLOOD CULTURE FOR BACTERIA: CPT | Performed by: EMERGENCY MEDICINE

## 2023-01-01 PROCEDURE — 93306 TTE W/DOPPLER COMPLETE: CPT | Performed by: INTERNAL MEDICINE

## 2023-01-01 PROCEDURE — 82570 ASSAY OF URINE CREATININE: CPT | Performed by: INTERNAL MEDICINE

## 2023-01-01 PROCEDURE — 97162 PT EVAL MOD COMPLEX 30 MIN: CPT

## 2023-01-01 PROCEDURE — 63710000001 INSULIN LISPRO (HUMAN) PER 5 UNITS: Performed by: INTERNAL MEDICINE

## 2023-01-01 PROCEDURE — 80053 COMPREHEN METABOLIC PANEL: CPT | Performed by: INTERNAL MEDICINE

## 2023-01-01 PROCEDURE — 99232 SBSQ HOSP IP/OBS MODERATE 35: CPT | Performed by: INTERNAL MEDICINE

## 2023-01-01 PROCEDURE — 84466 ASSAY OF TRANSFERRIN: CPT | Performed by: INTERNAL MEDICINE

## 2023-01-01 PROCEDURE — 0202U NFCT DS 22 TRGT SARS-COV-2: CPT | Performed by: INTERNAL MEDICINE

## 2023-01-01 PROCEDURE — 82024 ASSAY OF ACTH: CPT | Performed by: INTERNAL MEDICINE

## 2023-01-01 PROCEDURE — 85025 COMPLETE CBC W/AUTO DIFF WBC: CPT | Performed by: EMERGENCY MEDICINE

## 2023-01-01 PROCEDURE — 87340 HEPATITIS B SURFACE AG IA: CPT | Performed by: INTERNAL MEDICINE

## 2023-01-01 PROCEDURE — 80048 BASIC METABOLIC PNL TOTAL CA: CPT | Performed by: EMERGENCY MEDICINE

## 2023-01-01 PROCEDURE — 87086 URINE CULTURE/COLONY COUNT: CPT | Performed by: EMERGENCY MEDICINE

## 2023-01-01 PROCEDURE — 92610 EVALUATE SWALLOWING FUNCTION: CPT

## 2023-01-01 PROCEDURE — 25010000002 NA FERRIC GLUC CPLX PER 12.5 MG: Performed by: INTERNAL MEDICINE

## 2023-01-01 PROCEDURE — 94664 DEMO&/EVAL PT USE INHALER: CPT

## 2023-01-01 PROCEDURE — 0JH63XZ INSERTION OF TUNNELED VASCULAR ACCESS DEVICE INTO CHEST SUBCUTANEOUS TISSUE AND FASCIA, PERCUTANEOUS APPROACH: ICD-10-PCS | Performed by: RADIOLOGY

## 2023-01-01 PROCEDURE — 84100 ASSAY OF PHOSPHORUS: CPT | Performed by: INTERNAL MEDICINE

## 2023-01-01 PROCEDURE — 71045 X-RAY EXAM CHEST 1 VIEW: CPT

## 2023-01-01 PROCEDURE — 80061 LIPID PANEL: CPT | Performed by: NURSE PRACTITIONER

## 2023-01-01 PROCEDURE — 99285 EMERGENCY DEPT VISIT HI MDM: CPT

## 2023-01-01 PROCEDURE — 85730 THROMBOPLASTIN TIME PARTIAL: CPT | Performed by: RADIOLOGY

## 2023-01-01 PROCEDURE — 81003 URINALYSIS AUTO W/O SCOPE: CPT | Performed by: INTERNAL MEDICINE

## 2023-01-01 PROCEDURE — 25010000002 CEFTRIAXONE PER 250 MG: Performed by: NURSE PRACTITIONER

## 2023-01-01 PROCEDURE — 86901 BLOOD TYPING SEROLOGIC RH(D): CPT

## 2023-01-01 PROCEDURE — 63710000001 INSULIN REGULAR HUMAN PER 5 UNITS: Performed by: INTERNAL MEDICINE

## 2023-01-01 PROCEDURE — 87077 CULTURE AEROBIC IDENTIFY: CPT | Performed by: EMERGENCY MEDICINE

## 2023-01-01 PROCEDURE — 86923 COMPATIBILITY TEST ELECTRIC: CPT

## 2023-01-01 PROCEDURE — 94761 N-INVAS EAR/PLS OXIMETRY MLT: CPT

## 2023-01-01 PROCEDURE — 84443 ASSAY THYROID STIM HORMONE: CPT | Performed by: INTERNAL MEDICINE

## 2023-01-01 PROCEDURE — 25010000002 HEPARIN LOCK FLUSH PER 10 UNITS: Performed by: EMERGENCY MEDICINE

## 2023-01-01 PROCEDURE — 36410 VNPNXR 3YR/> PHY/QHP DX/THER: CPT

## 2023-01-01 PROCEDURE — 5A1D70Z PERFORMANCE OF URINARY FILTRATION, INTERMITTENT, LESS THAN 6 HOURS PER DAY: ICD-10-PCS | Performed by: INTERNAL MEDICINE

## 2023-01-01 PROCEDURE — 25010000002 DOPAMINE PER 40 MG: Performed by: INTERNAL MEDICINE

## 2023-01-01 PROCEDURE — 25010000002 DOBUTAMINE PER 250 MG: Performed by: INTERNAL MEDICINE

## 2023-01-01 PROCEDURE — 80069 RENAL FUNCTION PANEL: CPT | Performed by: INTERNAL MEDICINE

## 2023-01-01 PROCEDURE — 94640 AIRWAY INHALATION TREATMENT: CPT

## 2023-01-01 PROCEDURE — G0378 HOSPITAL OBSERVATION PER HR: HCPCS

## 2023-01-01 PROCEDURE — 84484 ASSAY OF TROPONIN QUANT: CPT | Performed by: EMERGENCY MEDICINE

## 2023-01-01 PROCEDURE — 87186 SC STD MICRODIL/AGAR DIL: CPT | Performed by: EMERGENCY MEDICINE

## 2023-01-01 PROCEDURE — 76937 US GUIDE VASCULAR ACCESS: CPT

## 2023-01-01 PROCEDURE — P9047 ALBUMIN (HUMAN), 25%, 50ML: HCPCS | Performed by: INTERNAL MEDICINE

## 2023-01-01 PROCEDURE — 83605 ASSAY OF LACTIC ACID: CPT

## 2023-01-01 PROCEDURE — 82533 TOTAL CORTISOL: CPT | Performed by: STUDENT IN AN ORGANIZED HEALTH CARE EDUCATION/TRAINING PROGRAM

## 2023-01-01 PROCEDURE — 25010000002 HEPARIN (PORCINE) PER 1000 UNITS: Performed by: RADIOLOGY

## 2023-01-01 PROCEDURE — 25010000002 ALBUMIN HUMAN 25% PER 50 ML: Performed by: INTERNAL MEDICINE

## 2023-01-01 PROCEDURE — 02H633Z INSERTION OF INFUSION DEVICE INTO RIGHT ATRIUM, PERCUTANEOUS APPROACH: ICD-10-PCS | Performed by: RADIOLOGY

## 2023-01-01 PROCEDURE — 84132 ASSAY OF SERUM POTASSIUM: CPT | Performed by: INTERNAL MEDICINE

## 2023-01-01 PROCEDURE — 25010000002 HEPARIN (PORCINE) PER 1000 UNITS: Performed by: INTERNAL MEDICINE

## 2023-01-01 PROCEDURE — 99291 CRITICAL CARE FIRST HOUR: CPT

## 2023-01-01 PROCEDURE — 86706 HEP B SURFACE ANTIBODY: CPT | Performed by: INTERNAL MEDICINE

## 2023-01-01 PROCEDURE — 0 LIDOCAINE 1 % SOLUTION: Performed by: RADIOLOGY

## 2023-01-01 PROCEDURE — 83540 ASSAY OF IRON: CPT | Performed by: INTERNAL MEDICINE

## 2023-01-01 PROCEDURE — 83880 ASSAY OF NATRIURETIC PEPTIDE: CPT | Performed by: NURSE PRACTITIONER

## 2023-01-01 PROCEDURE — 84439 ASSAY OF FREE THYROXINE: CPT | Performed by: INTERNAL MEDICINE

## 2023-01-01 PROCEDURE — 81001 URINALYSIS AUTO W/SCOPE: CPT | Performed by: EMERGENCY MEDICINE

## 2023-01-01 PROCEDURE — 83036 HEMOGLOBIN GLYCOSYLATED A1C: CPT | Performed by: NURSE PRACTITIONER

## 2023-01-01 PROCEDURE — 0202U NFCT DS 22 TRGT SARS-COV-2: CPT

## 2023-01-01 PROCEDURE — 36558 INSERT TUNNELED CV CATH: CPT

## 2023-01-01 PROCEDURE — 97530 THERAPEUTIC ACTIVITIES: CPT

## 2023-01-01 PROCEDURE — 99222 1ST HOSP IP/OBS MODERATE 55: CPT | Performed by: INTERNAL MEDICINE

## 2023-01-01 PROCEDURE — 80048 BASIC METABOLIC PNL TOTAL CA: CPT | Performed by: INTERNAL MEDICINE

## 2023-01-01 PROCEDURE — 83690 ASSAY OF LIPASE: CPT | Performed by: EMERGENCY MEDICINE

## 2023-01-01 PROCEDURE — 85652 RBC SED RATE AUTOMATED: CPT | Performed by: INTERNAL MEDICINE

## 2023-01-01 PROCEDURE — 84100 ASSAY OF PHOSPHORUS: CPT | Performed by: HOSPITALIST

## 2023-01-01 PROCEDURE — 84145 PROCALCITONIN (PCT): CPT

## 2023-01-01 PROCEDURE — 84484 ASSAY OF TROPONIN QUANT: CPT | Performed by: PHYSICIAN ASSISTANT

## 2023-01-01 PROCEDURE — 86900 BLOOD TYPING SEROLOGIC ABO: CPT

## 2023-01-01 PROCEDURE — 94660 CPAP INITIATION&MGMT: CPT

## 2023-01-01 PROCEDURE — 83735 ASSAY OF MAGNESIUM: CPT | Performed by: EMERGENCY MEDICINE

## 2023-01-01 PROCEDURE — 36415 COLL VENOUS BLD VENIPUNCTURE: CPT

## 2023-01-01 PROCEDURE — 85610 PROTHROMBIN TIME: CPT | Performed by: EMERGENCY MEDICINE

## 2023-01-01 PROCEDURE — 86705 HEP B CORE ANTIBODY IGM: CPT | Performed by: INTERNAL MEDICINE

## 2023-01-01 PROCEDURE — 82274 ASSAY TEST FOR BLOOD FECAL: CPT | Performed by: INTERNAL MEDICINE

## 2023-01-01 PROCEDURE — 87102 FUNGUS ISOLATION CULTURE: CPT | Performed by: STUDENT IN AN ORGANIZED HEALTH CARE EDUCATION/TRAINING PROGRAM

## 2023-01-01 PROCEDURE — 25010000002 COSYNTROPIN PER 0.25 MG: Performed by: STUDENT IN AN ORGANIZED HEALTH CARE EDUCATION/TRAINING PROGRAM

## 2023-01-01 PROCEDURE — 25010000002 CEFAZOLIN PER 500 MG: Performed by: RADIOLOGY

## 2023-01-01 PROCEDURE — 87040 BLOOD CULTURE FOR BACTERIA: CPT

## 2023-01-01 PROCEDURE — 36430 TRANSFUSION BLD/BLD COMPNT: CPT

## 2023-01-01 PROCEDURE — 71046 X-RAY EXAM CHEST 2 VIEWS: CPT

## 2023-01-01 PROCEDURE — 25010000002 MAGNESIUM SULFATE 2 GM/50ML SOLUTION: Performed by: HOSPITALIST

## 2023-01-01 PROCEDURE — 85730 THROMBOPLASTIN TIME PARTIAL: CPT | Performed by: EMERGENCY MEDICINE

## 2023-01-01 PROCEDURE — 63710000001 INSULIN GLARGINE PER 5 UNITS: Performed by: INTERNAL MEDICINE

## 2023-01-01 PROCEDURE — 63710000001 INSULIN LISPRO (HUMAN) PER 5 UNITS: Performed by: HOSPITALIST

## 2023-01-01 PROCEDURE — 25010000002 FUROSEMIDE PER 20 MG: Performed by: HOSPITALIST

## 2023-01-01 PROCEDURE — 25010000002 CEFTRIAXONE PER 250 MG: Performed by: PHYSICIAN ASSISTANT

## 2023-01-01 PROCEDURE — 25010000002 GLUCAGON (HUMAN RECOMBINANT) 1 MG RECONSTITUTED SOLUTION 1 EACH VIAL: Performed by: EMERGENCY MEDICINE

## 2023-01-01 PROCEDURE — 86901 BLOOD TYPING SEROLOGIC RH(D): CPT | Performed by: INTERNAL MEDICINE

## 2023-01-01 PROCEDURE — 93306 TTE W/DOPPLER COMPLETE: CPT

## 2023-01-01 PROCEDURE — B548ZZA ULTRASONOGRAPHY OF SUPERIOR VENA CAVA, GUIDANCE: ICD-10-PCS | Performed by: RADIOLOGY

## 2023-01-01 PROCEDURE — 86850 RBC ANTIBODY SCREEN: CPT | Performed by: INTERNAL MEDICINE

## 2023-01-01 PROCEDURE — 86900 BLOOD TYPING SEROLOGIC ABO: CPT | Performed by: INTERNAL MEDICINE

## 2023-01-01 PROCEDURE — 99221 1ST HOSP IP/OBS SF/LOW 40: CPT | Performed by: NURSE PRACTITIONER

## 2023-01-01 PROCEDURE — 84443 ASSAY THYROID STIM HORMONE: CPT | Performed by: NURSE PRACTITIONER

## 2023-01-01 PROCEDURE — 77001 FLUOROGUIDE FOR VEIN DEVICE: CPT

## 2023-01-01 PROCEDURE — 80053 COMPREHEN METABOLIC PANEL: CPT | Performed by: EMERGENCY MEDICINE

## 2023-01-01 PROCEDURE — C1751 CATH, INF, PER/CENT/MIDLINE: HCPCS

## 2023-01-01 PROCEDURE — 84300 ASSAY OF URINE SODIUM: CPT | Performed by: INTERNAL MEDICINE

## 2023-01-01 PROCEDURE — 87102 FUNGUS ISOLATION CULTURE: CPT | Performed by: INTERNAL MEDICINE

## 2023-01-01 PROCEDURE — 97116 GAIT TRAINING THERAPY: CPT

## 2023-01-01 PROCEDURE — 97165 OT EVAL LOW COMPLEX 30 MIN: CPT

## 2023-01-01 PROCEDURE — 86140 C-REACTIVE PROTEIN: CPT | Performed by: INTERNAL MEDICINE

## 2023-01-01 PROCEDURE — C1750 CATH, HEMODIALYSIS,LONG-TERM: HCPCS

## 2023-01-01 PROCEDURE — 86376 MICROSOMAL ANTIBODY EACH: CPT | Performed by: INTERNAL MEDICINE

## 2023-01-01 PROCEDURE — 25010000002 CEFTRIAXONE PER 250 MG

## 2023-01-01 PROCEDURE — 25010000002 COSYNTROPIN PER 0.25 MG: Performed by: INTERNAL MEDICINE

## 2023-01-01 PROCEDURE — 84145 PROCALCITONIN (PCT): CPT | Performed by: INTERNAL MEDICINE

## 2023-01-01 PROCEDURE — 25010000002 FENTANYL CITRATE (PF) 50 MCG/ML SOLUTION: Performed by: RADIOLOGY

## 2023-01-01 PROCEDURE — 93005 ELECTROCARDIOGRAM TRACING: CPT | Performed by: NURSE PRACTITIONER

## 2023-01-01 PROCEDURE — 36415 COLL VENOUS BLD VENIPUNCTURE: CPT | Performed by: INTERNAL MEDICINE

## 2023-01-01 PROCEDURE — 02HV33Z INSERTION OF INFUSION DEVICE INTO SUPERIOR VENA CAVA, PERCUTANEOUS APPROACH: ICD-10-PCS | Performed by: EMERGENCY MEDICINE

## 2023-01-01 PROCEDURE — 85610 PROTHROMBIN TIME: CPT | Performed by: RADIOLOGY

## 2023-01-01 PROCEDURE — 93005 ELECTROCARDIOGRAM TRACING: CPT | Performed by: EMERGENCY MEDICINE

## 2023-01-01 PROCEDURE — 84156 ASSAY OF PROTEIN URINE: CPT | Performed by: INTERNAL MEDICINE

## 2023-01-01 PROCEDURE — 63710000001 INSULIN LISPRO (HUMAN) PER 5 UNITS: Performed by: STUDENT IN AN ORGANIZED HEALTH CARE EDUCATION/TRAINING PROGRAM

## 2023-01-01 PROCEDURE — P9016 RBC LEUKOCYTES REDUCED: HCPCS

## 2023-01-01 RX ORDER — PANTOPRAZOLE SODIUM 40 MG/1
40 TABLET, DELAYED RELEASE ORAL DAILY
COMMUNITY
Start: 2023-01-01

## 2023-01-01 RX ORDER — SODIUM CHLORIDE 0.9 % (FLUSH) 0.9 %
10 SYRINGE (ML) INJECTION AS NEEDED
Status: DISCONTINUED | OUTPATIENT
Start: 2023-01-01 | End: 2023-01-01 | Stop reason: HOSPADM

## 2023-01-01 RX ORDER — DEXTROSE MONOHYDRATE 100 MG/ML
50 INJECTION, SOLUTION INTRAVENOUS CONTINUOUS
Status: DISCONTINUED | OUTPATIENT
Start: 2023-01-01 | End: 2023-01-01

## 2023-01-01 RX ORDER — TAMSULOSIN HYDROCHLORIDE 0.4 MG/1
0.4 CAPSULE ORAL NIGHTLY
Status: DISCONTINUED | OUTPATIENT
Start: 2023-01-01 | End: 2023-01-01

## 2023-01-01 RX ORDER — NICOTINE POLACRILEX 4 MG
15 LOZENGE BUCCAL
Status: DISCONTINUED | OUTPATIENT
Start: 2023-01-01 | End: 2023-01-01 | Stop reason: HOSPADM

## 2023-01-01 RX ORDER — SODIUM CHLORIDE 9 MG/ML
75 INJECTION, SOLUTION INTRAVENOUS CONTINUOUS
Status: DISCONTINUED | OUTPATIENT
Start: 2023-01-01 | End: 2023-01-01

## 2023-01-01 RX ORDER — MIDODRINE HYDROCHLORIDE 5 MG/1
5 TABLET ORAL
Qty: 90 TABLET | Refills: 0 | Status: SHIPPED | OUTPATIENT
Start: 2023-01-01 | End: 2023-01-01

## 2023-01-01 RX ORDER — COSYNTROPIN 0.25 MG/ML
0.25 INJECTION, POWDER, FOR SOLUTION INTRAMUSCULAR; INTRAVENOUS ONCE
Status: COMPLETED | OUTPATIENT
Start: 2023-01-01 | End: 2023-01-01

## 2023-01-01 RX ORDER — POLYETHYLENE GLYCOL 3350 17 G/17G
17 POWDER, FOR SOLUTION ORAL DAILY PRN
Status: DISCONTINUED | OUTPATIENT
Start: 2023-01-01 | End: 2023-01-01

## 2023-01-01 RX ORDER — ACETAMINOPHEN 325 MG/1
650 TABLET ORAL EVERY 4 HOURS PRN
Status: DISCONTINUED | OUTPATIENT
Start: 2023-01-01 | End: 2023-01-01 | Stop reason: HOSPADM

## 2023-01-01 RX ORDER — AMOXICILLIN AND CLAVULANATE POTASSIUM 500; 125 MG/1; MG/1
1 TABLET, FILM COATED ORAL EVERY 12 HOURS SCHEDULED
Qty: 5 TABLET | Refills: 0 | Status: SHIPPED | OUTPATIENT
Start: 2023-01-01 | End: 2023-01-01 | Stop reason: HOSPADM

## 2023-01-01 RX ORDER — LIDOCAINE HYDROCHLORIDE 20 MG/ML
INJECTION, SOLUTION INFILTRATION; PERINEURAL AS NEEDED
Status: COMPLETED | OUTPATIENT
Start: 2023-01-01 | End: 2023-01-01

## 2023-01-01 RX ORDER — AMOXICILLIN 250 MG
2 CAPSULE ORAL 2 TIMES DAILY
Status: DISCONTINUED | OUTPATIENT
Start: 2023-01-01 | End: 2023-01-01

## 2023-01-01 RX ORDER — INSULIN LISPRO 100 [IU]/ML
2-7 INJECTION, SOLUTION INTRAVENOUS; SUBCUTANEOUS
Status: DISCONTINUED | OUTPATIENT
Start: 2023-01-01 | End: 2023-01-01 | Stop reason: HOSPADM

## 2023-01-01 RX ORDER — NITROGLYCERIN 0.4 MG/1
0.4 TABLET SUBLINGUAL
Status: DISCONTINUED | OUTPATIENT
Start: 2023-01-01 | End: 2023-01-01 | Stop reason: HOSPADM

## 2023-01-01 RX ORDER — INSULIN LISPRO 100 [IU]/ML
2 INJECTION, SOLUTION INTRAVENOUS; SUBCUTANEOUS ONCE
Status: COMPLETED | OUTPATIENT
Start: 2023-01-01 | End: 2023-01-01

## 2023-01-01 RX ORDER — INSULIN LISPRO 100 [IU]/ML
INJECTION, SOLUTION INTRAVENOUS; SUBCUTANEOUS 4 TIMES DAILY
COMMUNITY

## 2023-01-01 RX ORDER — OLANZAPINE 10 MG/2ML
1 INJECTION, POWDER, LYOPHILIZED, FOR SOLUTION INTRAMUSCULAR
Status: DISCONTINUED | OUTPATIENT
Start: 2023-01-01 | End: 2023-01-01 | Stop reason: HOSPADM

## 2023-01-01 RX ORDER — ACETAMINOPHEN 650 MG/1
650 SUPPOSITORY RECTAL EVERY 4 HOURS PRN
Status: DISCONTINUED | OUTPATIENT
Start: 2023-01-01 | End: 2023-01-01 | Stop reason: HOSPADM

## 2023-01-01 RX ORDER — DEXTROSE MONOHYDRATE 25 G/50ML
25 INJECTION, SOLUTION INTRAVENOUS
Status: DISCONTINUED | OUTPATIENT
Start: 2023-01-01 | End: 2023-01-01 | Stop reason: SDUPTHER

## 2023-01-01 RX ORDER — BISACODYL 10 MG
10 SUPPOSITORY, RECTAL RECTAL DAILY PRN
Status: DISCONTINUED | OUTPATIENT
Start: 2023-01-01 | End: 2023-01-01

## 2023-01-01 RX ORDER — AMOXICILLIN AND CLAVULANATE POTASSIUM 875; 125 MG/1; MG/1
1 TABLET, FILM COATED ORAL EVERY 12 HOURS SCHEDULED
Status: DISCONTINUED | OUTPATIENT
Start: 2023-01-01 | End: 2023-01-01

## 2023-01-01 RX ORDER — ATORVASTATIN CALCIUM 40 MG/1
40 TABLET, FILM COATED ORAL NIGHTLY
Status: DISCONTINUED | OUTPATIENT
Start: 2023-01-01 | End: 2023-01-01 | Stop reason: HOSPADM

## 2023-01-01 RX ORDER — BUMETANIDE 1 MG/1
1 TABLET ORAL 3 TIMES DAILY
Status: DISCONTINUED | OUTPATIENT
Start: 2023-01-01 | End: 2023-01-01

## 2023-01-01 RX ORDER — AMIODARONE HYDROCHLORIDE 200 MG/1
200 TABLET ORAL 3 TIMES DAILY
Status: DISCONTINUED | OUTPATIENT
Start: 2023-01-01 | End: 2023-01-01 | Stop reason: HOSPADM

## 2023-01-01 RX ORDER — BUMETANIDE 1 MG/1
1 TABLET ORAL
Status: DISCONTINUED | OUTPATIENT
Start: 2023-01-01 | End: 2023-01-01

## 2023-01-01 RX ORDER — BUMETANIDE 0.25 MG/ML
3 INJECTION INTRAMUSCULAR; INTRAVENOUS ONCE
Status: COMPLETED | OUTPATIENT
Start: 2023-01-01 | End: 2023-01-01

## 2023-01-01 RX ORDER — ONDANSETRON 2 MG/ML
4 INJECTION INTRAMUSCULAR; INTRAVENOUS EVERY 6 HOURS PRN
Status: DISCONTINUED | OUTPATIENT
Start: 2023-01-01 | End: 2023-01-01 | Stop reason: HOSPADM

## 2023-01-01 RX ORDER — ALUMINA, MAGNESIA, AND SIMETHICONE 2400; 2400; 240 MG/30ML; MG/30ML; MG/30ML
15 SUSPENSION ORAL EVERY 6 HOURS PRN
Status: DISCONTINUED | OUTPATIENT
Start: 2023-01-01 | End: 2023-01-01 | Stop reason: HOSPADM

## 2023-01-01 RX ORDER — SODIUM CHLORIDE 0.9 % (FLUSH) 0.9 %
10 SYRINGE (ML) INJECTION EVERY 12 HOURS SCHEDULED
Status: DISCONTINUED | OUTPATIENT
Start: 2023-01-01 | End: 2023-01-01 | Stop reason: HOSPADM

## 2023-01-01 RX ORDER — DEXTROSE MONOHYDRATE 25 G/50ML
INJECTION, SOLUTION INTRAVENOUS
Status: COMPLETED
Start: 2023-01-01 | End: 2023-01-01

## 2023-01-01 RX ORDER — TAMSULOSIN HYDROCHLORIDE 0.4 MG/1
1 CAPSULE ORAL DAILY
COMMUNITY

## 2023-01-01 RX ORDER — BUMETANIDE 1 MG/1
1 TABLET ORAL DAILY
Qty: 30 TABLET | Refills: 0 | Status: SHIPPED | OUTPATIENT
Start: 2023-01-01

## 2023-01-01 RX ORDER — CHOLECALCIFEROL (VITAMIN D3) 125 MCG
5 CAPSULE ORAL NIGHTLY PRN
Status: DISCONTINUED | OUTPATIENT
Start: 2023-01-01 | End: 2023-01-01 | Stop reason: HOSPADM

## 2023-01-01 RX ORDER — BUDESONIDE 0.5 MG/2ML
0.5 INHALANT ORAL
Status: DISCONTINUED | OUTPATIENT
Start: 2023-01-01 | End: 2023-01-01

## 2023-01-01 RX ORDER — BUMETANIDE 1 MG/1
1 TABLET ORAL DAILY
Status: DISCONTINUED | OUTPATIENT
Start: 2023-01-01 | End: 2023-01-01

## 2023-01-01 RX ORDER — ACETAMINOPHEN 650 MG
TABLET, EXTENDED RELEASE ORAL DAILY
Status: DISCONTINUED | OUTPATIENT
Start: 2023-01-01 | End: 2023-01-01 | Stop reason: HOSPADM

## 2023-01-01 RX ORDER — HEPARIN SODIUM 1000 [USP'U]/ML
4500 INJECTION, SOLUTION INTRAVENOUS; SUBCUTANEOUS AS NEEDED
Status: DISCONTINUED | OUTPATIENT
Start: 2023-01-01 | End: 2023-01-01 | Stop reason: HOSPADM

## 2023-01-01 RX ORDER — ACETAMINOPHEN 160 MG/5ML
650 SOLUTION ORAL EVERY 4 HOURS PRN
Status: DISCONTINUED | OUTPATIENT
Start: 2023-01-01 | End: 2023-01-01 | Stop reason: HOSPADM

## 2023-01-01 RX ORDER — COSYNTROPIN 0.25 MG/ML
0.25 INJECTION, POWDER, FOR SOLUTION INTRAMUSCULAR; INTRAVENOUS ONCE
Status: DISCONTINUED | OUTPATIENT
Start: 2023-01-01 | End: 2023-01-01

## 2023-01-01 RX ORDER — DEXTROSE MONOHYDRATE 25 G/50ML
25 INJECTION, SOLUTION INTRAVENOUS
Status: DISCONTINUED | OUTPATIENT
Start: 2023-01-01 | End: 2023-01-01 | Stop reason: HOSPADM

## 2023-01-01 RX ORDER — AMLODIPINE BESYLATE 5 MG/1
5 TABLET ORAL DAILY
Status: DISCONTINUED | OUTPATIENT
Start: 2023-01-01 | End: 2023-01-01

## 2023-01-01 RX ORDER — LIDOCAINE HYDROCHLORIDE 10 MG/ML
INJECTION, SOLUTION INFILTRATION; PERINEURAL AS NEEDED
Status: COMPLETED | OUTPATIENT
Start: 2023-01-01 | End: 2023-01-01

## 2023-01-01 RX ORDER — MAGNESIUM SULFATE HEPTAHYDRATE 40 MG/ML
2 INJECTION, SOLUTION INTRAVENOUS ONCE
Status: COMPLETED | OUTPATIENT
Start: 2023-01-01 | End: 2023-01-01

## 2023-01-01 RX ORDER — CLOPIDOGREL BISULFATE 75 MG/1
75 TABLET ORAL DAILY
Status: DISCONTINUED | OUTPATIENT
Start: 2023-01-01 | End: 2023-01-01 | Stop reason: HOSPADM

## 2023-01-01 RX ORDER — DEXTROSE MONOHYDRATE 25 G/50ML
50 INJECTION, SOLUTION INTRAVENOUS
Status: DISCONTINUED | OUTPATIENT
Start: 2023-01-01 | End: 2023-01-01 | Stop reason: HOSPADM

## 2023-01-01 RX ORDER — DEXTROSE MONOHYDRATE 25 G/50ML
25 INJECTION, SOLUTION INTRAVENOUS ONCE
Status: DISCONTINUED | OUTPATIENT
Start: 2023-01-01 | End: 2023-01-01

## 2023-01-01 RX ORDER — AMIODARONE HYDROCHLORIDE 200 MG/1
200 TABLET ORAL 3 TIMES DAILY
COMMUNITY
Start: 2023-01-01

## 2023-01-01 RX ORDER — COSYNTROPIN 0.25 MG/ML
0.25 INJECTION, POWDER, FOR SOLUTION INTRAMUSCULAR; INTRAVENOUS ONCE
Status: CANCELLED | OUTPATIENT
Start: 2023-01-01 | End: 2023-01-01

## 2023-01-01 RX ORDER — ONDANSETRON 4 MG/1
4 TABLET, FILM COATED ORAL EVERY 6 HOURS PRN
Status: DISCONTINUED | OUTPATIENT
Start: 2023-01-01 | End: 2023-01-01 | Stop reason: HOSPADM

## 2023-01-01 RX ORDER — ALBUMIN (HUMAN) 12.5 G/50ML
12.5 SOLUTION INTRAVENOUS AS NEEDED
Status: DISPENSED | OUTPATIENT
Start: 2023-01-01 | End: 2023-01-01

## 2023-01-01 RX ORDER — DEXTROSE MONOHYDRATE 100 MG/ML
75 INJECTION, SOLUTION INTRAVENOUS CONTINUOUS
Status: DISCONTINUED | OUTPATIENT
Start: 2023-01-01 | End: 2023-01-01

## 2023-01-01 RX ORDER — INSULIN LISPRO 100 [IU]/ML
3 INJECTION, SOLUTION INTRAVENOUS; SUBCUTANEOUS
Status: DISCONTINUED | OUTPATIENT
Start: 2023-01-01 | End: 2023-01-01

## 2023-01-01 RX ORDER — SODIUM CHLORIDE 9 MG/ML
40 INJECTION, SOLUTION INTRAVENOUS AS NEEDED
Status: DISCONTINUED | OUTPATIENT
Start: 2023-01-01 | End: 2023-01-01 | Stop reason: HOSPADM

## 2023-01-01 RX ORDER — FENTANYL CITRATE 50 UG/ML
INJECTION, SOLUTION INTRAMUSCULAR; INTRAVENOUS AS NEEDED
Status: COMPLETED | OUTPATIENT
Start: 2023-01-01 | End: 2023-01-01

## 2023-01-01 RX ORDER — INSULIN GLARGINE 100 [IU]/ML
5 INJECTION, SOLUTION SUBCUTANEOUS DAILY
COMMUNITY

## 2023-01-01 RX ORDER — BUMETANIDE 0.25 MG/ML
2 INJECTION INTRAMUSCULAR; INTRAVENOUS EVERY 12 HOURS
Status: DISCONTINUED | OUTPATIENT
Start: 2023-01-01 | End: 2023-01-01

## 2023-01-01 RX ORDER — TORSEMIDE 10 MG/1
20 TABLET ORAL DAILY
Status: DISCONTINUED | OUTPATIENT
Start: 2023-01-01 | End: 2023-01-01

## 2023-01-01 RX ORDER — LEVOTHYROXINE SODIUM 0.03 MG/1
25 TABLET ORAL
Status: DISCONTINUED | OUTPATIENT
Start: 2023-01-01 | End: 2023-01-01 | Stop reason: HOSPADM

## 2023-01-01 RX ORDER — FOLIC ACID 1 MG/1
1 TABLET ORAL DAILY
Status: DISCONTINUED | OUTPATIENT
Start: 2023-01-01 | End: 2023-01-01 | Stop reason: HOSPADM

## 2023-01-01 RX ORDER — BUDESONIDE 0.5 MG/2ML
0.5 INHALANT ORAL
Status: DISCONTINUED | OUTPATIENT
Start: 2023-01-01 | End: 2023-01-01 | Stop reason: HOSPADM

## 2023-01-01 RX ORDER — HEPARIN SODIUM 1000 [USP'U]/ML
5000 INJECTION, SOLUTION INTRAVENOUS; SUBCUTANEOUS AS NEEDED
Status: DISCONTINUED | OUTPATIENT
Start: 2023-01-01 | End: 2023-01-01 | Stop reason: HOSPADM

## 2023-01-01 RX ORDER — INSULIN LISPRO 100 [IU]/ML
4 INJECTION, SOLUTION INTRAVENOUS; SUBCUTANEOUS ONCE
Status: COMPLETED | OUTPATIENT
Start: 2023-01-01 | End: 2023-01-01

## 2023-01-01 RX ORDER — BUMETANIDE 0.25 MG/ML
1 INJECTION INTRAMUSCULAR; INTRAVENOUS EVERY 12 HOURS
Status: DISCONTINUED | OUTPATIENT
Start: 2023-01-01 | End: 2023-01-01

## 2023-01-01 RX ORDER — AMOXICILLIN AND CLAVULANATE POTASSIUM 500; 125 MG/1; MG/1
1 TABLET, FILM COATED ORAL EVERY 12 HOURS SCHEDULED
Status: DISCONTINUED | OUTPATIENT
Start: 2023-01-01 | End: 2023-01-01 | Stop reason: HOSPADM

## 2023-01-01 RX ORDER — ZINC OXIDE 25 %
1 OINTMENT (GRAM) TOPICAL DAILY PRN
COMMUNITY

## 2023-01-01 RX ORDER — TAMSULOSIN HYDROCHLORIDE 0.4 MG/1
0.4 CAPSULE ORAL DAILY
COMMUNITY
Start: 2023-01-01 | End: 2023-01-01

## 2023-01-01 RX ORDER — TAMSULOSIN HYDROCHLORIDE 0.4 MG/1
0.4 CAPSULE ORAL DAILY
Status: DISCONTINUED | OUTPATIENT
Start: 2023-01-01 | End: 2023-01-01 | Stop reason: HOSPADM

## 2023-01-01 RX ORDER — MIDODRINE HYDROCHLORIDE 5 MG/1
10 TABLET ORAL
Status: DISCONTINUED | OUTPATIENT
Start: 2023-01-01 | End: 2023-01-01 | Stop reason: HOSPADM

## 2023-01-01 RX ORDER — DEXTROSE MONOHYDRATE 100 MG/ML
25 INJECTION, SOLUTION INTRAVENOUS CONTINUOUS
Status: DISCONTINUED | OUTPATIENT
Start: 2023-01-01 | End: 2023-01-01

## 2023-01-01 RX ORDER — PANTOPRAZOLE SODIUM 40 MG/1
40 TABLET, DELAYED RELEASE ORAL DAILY
Status: DISCONTINUED | OUTPATIENT
Start: 2023-01-01 | End: 2023-01-01 | Stop reason: HOSPADM

## 2023-01-01 RX ORDER — DEXTROSE MONOHYDRATE 25 G/50ML
25 INJECTION, SOLUTION INTRAVENOUS ONCE
Status: COMPLETED | OUTPATIENT
Start: 2023-01-01 | End: 2023-01-01

## 2023-01-01 RX ORDER — TAMSULOSIN HYDROCHLORIDE 0.4 MG/1
0.4 CAPSULE ORAL DAILY
Status: DISCONTINUED | OUTPATIENT
Start: 2023-01-01 | End: 2023-01-01

## 2023-01-01 RX ORDER — NICOTINE POLACRILEX 4 MG
15 LOZENGE BUCCAL
Status: DISCONTINUED | OUTPATIENT
Start: 2023-01-01 | End: 2023-01-01 | Stop reason: SDUPTHER

## 2023-01-01 RX ORDER — HEPARIN SODIUM 1000 [USP'U]/ML
INJECTION, SOLUTION INTRAVENOUS; SUBCUTANEOUS AS NEEDED
Status: COMPLETED | OUTPATIENT
Start: 2023-01-01 | End: 2023-01-01

## 2023-01-01 RX ORDER — IPRATROPIUM BROMIDE AND ALBUTEROL SULFATE 2.5; .5 MG/3ML; MG/3ML
3 SOLUTION RESPIRATORY (INHALATION)
Status: DISCONTINUED | OUTPATIENT
Start: 2023-01-01 | End: 2023-01-01

## 2023-01-01 RX ORDER — DEXTROSE AND SODIUM CHLORIDE 5; .9 G/100ML; G/100ML
20 INJECTION, SOLUTION INTRAVENOUS CONTINUOUS
Status: DISCONTINUED | OUTPATIENT
Start: 2023-01-01 | End: 2023-01-01

## 2023-01-01 RX ORDER — MIDODRINE HYDROCHLORIDE 10 MG/1
10 TABLET ORAL
COMMUNITY

## 2023-01-01 RX ORDER — DOPAMINE HYDROCHLORIDE 160 MG/100ML
2-20 INJECTION, SOLUTION INTRAVENOUS
Status: DISCONTINUED | OUTPATIENT
Start: 2023-01-01 | End: 2023-01-01

## 2023-01-01 RX ORDER — MIDODRINE HYDROCHLORIDE 5 MG/1
10 TABLET ORAL
Status: DISCONTINUED | OUTPATIENT
Start: 2023-01-01 | End: 2023-01-01 | Stop reason: SDUPTHER

## 2023-01-01 RX ORDER — BUMETANIDE 0.25 MG/ML
2 INJECTION INTRAMUSCULAR; INTRAVENOUS EVERY 8 HOURS
Status: COMPLETED | OUTPATIENT
Start: 2023-01-01 | End: 2023-01-01

## 2023-01-01 RX ORDER — GLUCAGON 3 MG/1
3 POWDER NASAL AS NEEDED
COMMUNITY

## 2023-01-01 RX ORDER — NOREPINEPHRINE BIT/0.9 % NACL 8 MG/250ML
.02-.3 INFUSION BOTTLE (ML) INTRAVENOUS
Status: DISCONTINUED | OUTPATIENT
Start: 2023-01-01 | End: 2023-01-01

## 2023-01-01 RX ORDER — BUDESONIDE 0.25 MG/2ML
0.25 INHALANT ORAL
Status: DISCONTINUED | OUTPATIENT
Start: 2023-01-01 | End: 2023-01-01 | Stop reason: CLARIF

## 2023-01-01 RX ORDER — HEPARIN SODIUM (PORCINE) LOCK FLUSH IV SOLN 100 UNIT/ML 100 UNIT/ML
2500 SOLUTION INTRAVENOUS ONCE
Status: COMPLETED | OUTPATIENT
Start: 2023-01-01 | End: 2023-01-01

## 2023-01-01 RX ORDER — FOLIC ACID 1 MG/1
1 TABLET ORAL DAILY
Qty: 30 TABLET | Refills: 0 | Status: SHIPPED | OUTPATIENT
Start: 2023-01-01

## 2023-01-01 RX ORDER — FUROSEMIDE 40 MG/1
40 TABLET ORAL DAILY
Status: DISCONTINUED | OUTPATIENT
Start: 2023-01-01 | End: 2023-01-01

## 2023-01-01 RX ORDER — DEXTROSE MONOHYDRATE 100 MG/ML
25 INJECTION, SOLUTION INTRAVENOUS CONTINUOUS
Status: DISCONTINUED | OUTPATIENT
Start: 2023-01-01 | End: 2023-01-01 | Stop reason: SDUPTHER

## 2023-01-01 RX ORDER — IPRATROPIUM BROMIDE AND ALBUTEROL SULFATE 2.5; .5 MG/3ML; MG/3ML
3 SOLUTION RESPIRATORY (INHALATION)
Status: DISCONTINUED | OUTPATIENT
Start: 2023-01-01 | End: 2023-01-01 | Stop reason: HOSPADM

## 2023-01-01 RX ORDER — SODIUM CHLORIDE 9 MG/ML
50 INJECTION, SOLUTION INTRAVENOUS CONTINUOUS
Status: DISCONTINUED | OUTPATIENT
Start: 2023-01-01 | End: 2023-01-01

## 2023-01-01 RX ORDER — MULTIPLE VITAMINS W/ MINERALS TAB 9MG-400MCG
1 TAB ORAL DAILY
Status: DISCONTINUED | OUTPATIENT
Start: 2023-01-01 | End: 2023-01-01 | Stop reason: HOSPADM

## 2023-01-01 RX ORDER — BISACODYL 5 MG/1
5 TABLET, DELAYED RELEASE ORAL DAILY PRN
Status: DISCONTINUED | OUTPATIENT
Start: 2023-01-01 | End: 2023-01-01

## 2023-01-01 RX ORDER — LANOLIN ALCOHOL/MO/W.PET/CERES
100 CREAM (GRAM) TOPICAL DAILY
Qty: 30 TABLET | Refills: 0 | Status: SHIPPED | OUTPATIENT
Start: 2023-01-01

## 2023-01-01 RX ORDER — MIDODRINE HYDROCHLORIDE 5 MG/1
5 TABLET ORAL
Status: DISCONTINUED | OUTPATIENT
Start: 2023-01-01 | End: 2023-01-01 | Stop reason: HOSPADM

## 2023-01-01 RX ORDER — DOBUTAMINE HYDROCHLORIDE 100 MG/100ML
2-20 INJECTION INTRAVENOUS
Status: DISCONTINUED | OUTPATIENT
Start: 2023-01-01 | End: 2023-01-01 | Stop reason: HOSPADM

## 2023-01-01 RX ORDER — OLANZAPINE 10 MG/2ML
1 INJECTION, POWDER, LYOPHILIZED, FOR SOLUTION INTRAMUSCULAR ONCE
Status: COMPLETED | OUTPATIENT
Start: 2023-01-01 | End: 2023-01-01

## 2023-01-01 RX ORDER — INSULIN LISPRO 100 [IU]/ML
5 INJECTION, SOLUTION INTRAVENOUS; SUBCUTANEOUS
Status: DISCONTINUED | OUTPATIENT
Start: 2023-01-01 | End: 2023-01-01

## 2023-01-01 RX ORDER — FUROSEMIDE 40 MG/1
40 TABLET ORAL 2 TIMES DAILY
COMMUNITY
Start: 2023-01-01 | End: 2023-01-01 | Stop reason: HOSPADM

## 2023-01-01 RX ORDER — MANNITOL 250 MG/ML
50 INJECTION, SOLUTION INTRAVENOUS AS NEEDED
Status: DISCONTINUED | OUTPATIENT
Start: 2023-01-01 | End: 2023-01-01 | Stop reason: HOSPADM

## 2023-01-01 RX ORDER — BUMETANIDE 1 MG/1
1 TABLET ORAL DAILY
Status: DISCONTINUED | OUTPATIENT
Start: 2023-01-01 | End: 2023-01-01 | Stop reason: HOSPADM

## 2023-01-01 RX ORDER — OLANZAPINE 10 MG/2ML
1 INJECTION, POWDER, LYOPHILIZED, FOR SOLUTION INTRAMUSCULAR
Status: DISCONTINUED | OUTPATIENT
Start: 2023-01-01 | End: 2023-01-01 | Stop reason: SDUPTHER

## 2023-01-01 RX ORDER — FUROSEMIDE 10 MG/ML
40 INJECTION INTRAMUSCULAR; INTRAVENOUS ONCE
Status: COMPLETED | OUTPATIENT
Start: 2023-01-01 | End: 2023-01-01

## 2023-01-01 RX ORDER — MIDODRINE HYDROCHLORIDE 5 MG/1
5 TABLET ORAL
Status: DISCONTINUED | OUTPATIENT
Start: 2023-01-01 | End: 2023-01-01

## 2023-01-01 RX ADMIN — AMIODARONE HYDROCHLORIDE 200 MG: 200 TABLET ORAL at 16:33

## 2023-01-01 RX ADMIN — PANTOPRAZOLE SODIUM 40 MG: 40 TABLET, DELAYED RELEASE ORAL at 08:30

## 2023-01-01 RX ADMIN — SODIUM CHLORIDE 50 ML/HR: 9 INJECTION, SOLUTION INTRAVENOUS at 18:45

## 2023-01-01 RX ADMIN — APIXABAN 5 MG: 5 TABLET, FILM COATED ORAL at 08:14

## 2023-01-01 RX ADMIN — AMIODARONE HYDROCHLORIDE 200 MG: 200 TABLET ORAL at 20:10

## 2023-01-01 RX ADMIN — MULTIPLE VITAMINS W/ MINERALS TAB 1 TABLET: TAB at 07:48

## 2023-01-01 RX ADMIN — INSULIN LISPRO 5 UNITS: 100 INJECTION, SOLUTION INTRAVENOUS; SUBCUTANEOUS at 08:14

## 2023-01-01 RX ADMIN — ATORVASTATIN CALCIUM 40 MG: 40 TABLET, FILM COATED ORAL at 20:53

## 2023-01-01 RX ADMIN — FOLIC ACID 1 MG: 1 TABLET ORAL at 07:31

## 2023-01-01 RX ADMIN — SODIUM CHLORIDE 75 ML/HR: 9 INJECTION, SOLUTION INTRAVENOUS at 13:23

## 2023-01-01 RX ADMIN — MIDODRINE HYDROCHLORIDE 10 MG: 5 TABLET ORAL at 09:25

## 2023-01-01 RX ADMIN — AMIODARONE HYDROCHLORIDE 200 MG: 200 TABLET ORAL at 08:29

## 2023-01-01 RX ADMIN — ATORVASTATIN CALCIUM 40 MG: 40 TABLET, FILM COATED ORAL at 20:52

## 2023-01-01 RX ADMIN — PANTOPRAZOLE SODIUM 40 MG: 40 TABLET, DELAYED RELEASE ORAL at 13:39

## 2023-01-01 RX ADMIN — AMIODARONE HYDROCHLORIDE 200 MG: 200 TABLET ORAL at 16:53

## 2023-01-01 RX ADMIN — Medication 100 MG: at 08:55

## 2023-01-01 RX ADMIN — DEXTROSE MONOHYDRATE 25 G: 25 INJECTION, SOLUTION INTRAVENOUS at 04:34

## 2023-01-01 RX ADMIN — DOBUTAMINE IN DEXTROSE 15 MCG/KG/MIN: 100 INJECTION, SOLUTION INTRAVENOUS at 01:56

## 2023-01-01 RX ADMIN — ALBUMIN (HUMAN) 12.5 G: 0.25 INJECTION, SOLUTION INTRAVENOUS at 10:12

## 2023-01-01 RX ADMIN — CEFTRIAXONE 1 G: 1 INJECTION, POWDER, FOR SOLUTION INTRAMUSCULAR; INTRAVENOUS at 09:26

## 2023-01-01 RX ADMIN — CEFTRIAXONE 1 G: 1 INJECTION, POWDER, FOR SOLUTION INTRAMUSCULAR; INTRAVENOUS at 08:54

## 2023-01-01 RX ADMIN — AMIODARONE HYDROCHLORIDE 200 MG: 200 TABLET ORAL at 21:16

## 2023-01-01 RX ADMIN — BUMETANIDE 2 MG: 0.25 INJECTION INTRAMUSCULAR; INTRAVENOUS at 08:42

## 2023-01-01 RX ADMIN — Medication 10 ML: at 20:50

## 2023-01-01 RX ADMIN — Medication 10 ML: at 20:16

## 2023-01-01 RX ADMIN — COSYNTROPIN 0.25 MG: 0.25 INJECTION, POWDER, LYOPHILIZED, FOR SOLUTION INTRAVENOUS at 13:08

## 2023-01-01 RX ADMIN — Medication 10 ML: at 07:38

## 2023-01-01 RX ADMIN — TAMSULOSIN HYDROCHLORIDE 0.4 MG: 0.4 CAPSULE ORAL at 08:26

## 2023-01-01 RX ADMIN — Medication 10 ML: at 20:39

## 2023-01-01 RX ADMIN — APIXABAN 5 MG: 5 TABLET, FILM COATED ORAL at 09:06

## 2023-01-01 RX ADMIN — ATORVASTATIN CALCIUM 40 MG: 40 TABLET, FILM COATED ORAL at 20:26

## 2023-01-01 RX ADMIN — MIDODRINE HYDROCHLORIDE 10 MG: 5 TABLET ORAL at 17:14

## 2023-01-01 RX ADMIN — CLOPIDOGREL BISULFATE 75 MG: 75 TABLET ORAL at 13:39

## 2023-01-01 RX ADMIN — IPRATROPIUM BROMIDE AND ALBUTEROL SULFATE 3 ML: .5; 2.5 SOLUTION RESPIRATORY (INHALATION) at 11:30

## 2023-01-01 RX ADMIN — DEXTROSE MONOHYDRATE 50 ML/HR: 100 INJECTION, SOLUTION INTRAVENOUS at 13:57

## 2023-01-01 RX ADMIN — IPRATROPIUM BROMIDE AND ALBUTEROL SULFATE 3 ML: 2.5; .5 SOLUTION RESPIRATORY (INHALATION) at 07:37

## 2023-01-01 RX ADMIN — CLOPIDOGREL BISULFATE 75 MG: 75 TABLET ORAL at 09:06

## 2023-01-01 RX ADMIN — MULTIPLE VITAMINS W/ MINERALS TAB 1 TABLET: TAB at 08:55

## 2023-01-01 RX ADMIN — Medication 10 ML: at 20:19

## 2023-01-01 RX ADMIN — DEXTROSE MONOHYDRATE 75 ML/HR: 100 INJECTION, SOLUTION INTRAVENOUS at 21:58

## 2023-01-01 RX ADMIN — AMIODARONE HYDROCHLORIDE 200 MG: 200 TABLET ORAL at 20:41

## 2023-01-01 RX ADMIN — MULTIPLE VITAMINS W/ MINERALS TAB 1 TABLET: TAB at 08:29

## 2023-01-01 RX ADMIN — IPRATROPIUM BROMIDE AND ALBUTEROL SULFATE 3 ML: 2.5; .5 SOLUTION RESPIRATORY (INHALATION) at 20:30

## 2023-01-01 RX ADMIN — FOLIC ACID 1 MG: 1 TABLET ORAL at 08:29

## 2023-01-01 RX ADMIN — Medication 10 ML: at 20:41

## 2023-01-01 RX ADMIN — DOBUTAMINE IN DEXTROSE 10 MCG/KG/MIN: 100 INJECTION, SOLUTION INTRAVENOUS at 21:12

## 2023-01-01 RX ADMIN — INSULIN LISPRO 5 UNITS: 100 INJECTION, SOLUTION INTRAVENOUS; SUBCUTANEOUS at 09:06

## 2023-01-01 RX ADMIN — PANTOPRAZOLE SODIUM 40 MG: 40 TABLET, DELAYED RELEASE ORAL at 08:11

## 2023-01-01 RX ADMIN — SODIUM CHLORIDE 75 ML/HR: 9 INJECTION, SOLUTION INTRAVENOUS at 06:52

## 2023-01-01 RX ADMIN — AMIODARONE HYDROCHLORIDE 200 MG: 200 TABLET ORAL at 21:12

## 2023-01-01 RX ADMIN — TAMSULOSIN HYDROCHLORIDE 0.4 MG: 0.4 CAPSULE ORAL at 09:06

## 2023-01-01 RX ADMIN — Medication 10 ML: at 20:45

## 2023-01-01 RX ADMIN — Medication 100 MG: at 08:51

## 2023-01-01 RX ADMIN — TORSEMIDE 20 MG: 10 TABLET ORAL at 08:50

## 2023-01-01 RX ADMIN — CEFTRIAXONE 1 G: 1 INJECTION, POWDER, FOR SOLUTION INTRAMUSCULAR; INTRAVENOUS at 12:14

## 2023-01-01 RX ADMIN — CLOPIDOGREL BISULFATE 75 MG: 75 TABLET ORAL at 08:26

## 2023-01-01 RX ADMIN — AMIODARONE HYDROCHLORIDE 200 MG: 200 TABLET ORAL at 20:19

## 2023-01-01 RX ADMIN — DOBUTAMINE IN DEXTROSE 2 MCG/KG/MIN: 100 INJECTION, SOLUTION INTRAVENOUS at 16:06

## 2023-01-01 RX ADMIN — Medication 2500 UNITS: at 04:41

## 2023-01-01 RX ADMIN — AMIODARONE HYDROCHLORIDE 200 MG: 200 TABLET ORAL at 08:56

## 2023-01-01 RX ADMIN — TAMSULOSIN HYDROCHLORIDE 0.4 MG: 0.4 CAPSULE ORAL at 08:56

## 2023-01-01 RX ADMIN — BUDESONIDE 0.5 MG: 0.5 SUSPENSION RESPIRATORY (INHALATION) at 06:25

## 2023-01-01 RX ADMIN — HEPARIN SODIUM 4500 UNITS: 1000 INJECTION, SOLUTION INTRAVENOUS; SUBCUTANEOUS at 15:32

## 2023-01-01 RX ADMIN — AMIODARONE HYDROCHLORIDE 200 MG: 200 TABLET ORAL at 20:45

## 2023-01-01 RX ADMIN — INSULIN LISPRO 2 UNITS: 100 INJECTION, SOLUTION INTRAVENOUS; SUBCUTANEOUS at 08:56

## 2023-01-01 RX ADMIN — IPRATROPIUM BROMIDE AND ALBUTEROL SULFATE 3 ML: 2.5; .5 SOLUTION RESPIRATORY (INHALATION) at 19:58

## 2023-01-01 RX ADMIN — AMIODARONE HYDROCHLORIDE 200 MG: 200 TABLET ORAL at 08:11

## 2023-01-01 RX ADMIN — ATORVASTATIN CALCIUM 40 MG: 40 TABLET, FILM COATED ORAL at 20:19

## 2023-01-01 RX ADMIN — Medication 10 ML: at 12:13

## 2023-01-01 RX ADMIN — PANTOPRAZOLE SODIUM 40 MG: 40 TABLET, DELAYED RELEASE ORAL at 07:32

## 2023-01-01 RX ADMIN — LEVOTHYROXINE SODIUM 25 MCG: 0.03 TABLET ORAL at 05:56

## 2023-01-01 RX ADMIN — DEXTROSE AND SODIUM CHLORIDE 20 ML/HR: 5; 900 INJECTION, SOLUTION INTRAVENOUS at 03:05

## 2023-01-01 RX ADMIN — AMLODIPINE BESYLATE 5 MG: 5 TABLET ORAL at 11:37

## 2023-01-01 RX ADMIN — PANTOPRAZOLE SODIUM 40 MG: 40 TABLET, DELAYED RELEASE ORAL at 08:26

## 2023-01-01 RX ADMIN — DEXTROSE MONOHYDRATE 50 ML/HR: 100 INJECTION, SOLUTION INTRAVENOUS at 16:51

## 2023-01-01 RX ADMIN — CLOPIDOGREL BISULFATE 75 MG: 75 TABLET ORAL at 11:35

## 2023-01-01 RX ADMIN — INSULIN LISPRO 5 UNITS: 100 INJECTION, SOLUTION INTRAVENOUS; SUBCUTANEOUS at 18:44

## 2023-01-01 RX ADMIN — AMIODARONE HYDROCHLORIDE 200 MG: 200 TABLET ORAL at 16:19

## 2023-01-01 RX ADMIN — Medication 10 ML: at 09:30

## 2023-01-01 RX ADMIN — BUDESONIDE 0.5 MG: 0.5 SUSPENSION RESPIRATORY (INHALATION) at 20:01

## 2023-01-01 RX ADMIN — AMIODARONE HYDROCHLORIDE 200 MG: 200 TABLET ORAL at 17:47

## 2023-01-01 RX ADMIN — IPRATROPIUM BROMIDE AND ALBUTEROL SULFATE 3 ML: 2.5; .5 SOLUTION RESPIRATORY (INHALATION) at 19:00

## 2023-01-01 RX ADMIN — AMOXICILLIN AND CLAVULANATE POTASSIUM 500 MG: 500; 125 TABLET, FILM COATED ORAL at 08:56

## 2023-01-01 RX ADMIN — BUDESONIDE 0.5 MG: 0.5 SUSPENSION RESPIRATORY (INHALATION) at 07:35

## 2023-01-01 RX ADMIN — LINAGLIPTIN 5 MG: 5 TABLET, FILM COATED ORAL at 14:14

## 2023-01-01 RX ADMIN — ALBUMIN (HUMAN) 12.5 G: 0.25 INJECTION, SOLUTION INTRAVENOUS at 08:39

## 2023-01-01 RX ADMIN — DEXTROSE 15 G: 15 GEL ORAL at 12:31

## 2023-01-01 RX ADMIN — Medication 10 ML: at 20:51

## 2023-01-01 RX ADMIN — INSULIN LISPRO 3 UNITS: 100 INJECTION, SOLUTION INTRAVENOUS; SUBCUTANEOUS at 08:22

## 2023-01-01 RX ADMIN — AMIODARONE HYDROCHLORIDE 200 MG: 200 TABLET ORAL at 20:52

## 2023-01-01 RX ADMIN — PANTOPRAZOLE SODIUM 40 MG: 40 TABLET, DELAYED RELEASE ORAL at 09:39

## 2023-01-01 RX ADMIN — BUDESONIDE 0.5 MG: 0.5 SUSPENSION RESPIRATORY (INHALATION) at 12:11

## 2023-01-01 RX ADMIN — Medication 10 ML: at 22:00

## 2023-01-01 RX ADMIN — IPRATROPIUM BROMIDE AND ALBUTEROL SULFATE 3 ML: .5; 2.5 SOLUTION RESPIRATORY (INHALATION) at 19:27

## 2023-01-01 RX ADMIN — BUMETANIDE 2 MG: 0.25 INJECTION INTRAMUSCULAR; INTRAVENOUS at 13:05

## 2023-01-01 RX ADMIN — TAMSULOSIN HYDROCHLORIDE 0.4 MG: 0.4 CAPSULE ORAL at 09:02

## 2023-01-01 RX ADMIN — Medication 10 ML: at 08:51

## 2023-01-01 RX ADMIN — APIXABAN 5 MG: 5 TABLET, FILM COATED ORAL at 09:38

## 2023-01-01 RX ADMIN — IPRATROPIUM BROMIDE AND ALBUTEROL SULFATE 3 ML: 2.5; .5 SOLUTION RESPIRATORY (INHALATION) at 10:48

## 2023-01-01 RX ADMIN — FOLIC ACID 1 MG: 1 TABLET ORAL at 09:25

## 2023-01-01 RX ADMIN — CEFTRIAXONE 2 G: 2 INJECTION, POWDER, FOR SOLUTION INTRAMUSCULAR; INTRAVENOUS at 07:57

## 2023-01-01 RX ADMIN — AMIODARONE HYDROCHLORIDE 200 MG: 200 TABLET ORAL at 20:34

## 2023-01-01 RX ADMIN — IPRATROPIUM BROMIDE AND ALBUTEROL SULFATE 3 ML: 2.5; .5 SOLUTION RESPIRATORY (INHALATION) at 15:24

## 2023-01-01 RX ADMIN — MIDODRINE HYDROCHLORIDE 10 MG: 5 TABLET ORAL at 13:01

## 2023-01-01 RX ADMIN — MIDODRINE HYDROCHLORIDE 10 MG: 5 TABLET ORAL at 11:45

## 2023-01-01 RX ADMIN — IPRATROPIUM BROMIDE AND ALBUTEROL SULFATE 3 ML: 2.5; .5 SOLUTION RESPIRATORY (INHALATION) at 07:50

## 2023-01-01 RX ADMIN — PANTOPRAZOLE SODIUM 40 MG: 40 TABLET, DELAYED RELEASE ORAL at 09:06

## 2023-01-01 RX ADMIN — GLUCAGON HYDROCHLORIDE 1 MG: 1 INJECTION, POWDER, FOR SOLUTION INTRAMUSCULAR; INTRAVENOUS; SUBCUTANEOUS at 04:24

## 2023-01-01 RX ADMIN — Medication 100 MG: at 13:39

## 2023-01-01 RX ADMIN — MIDODRINE HYDROCHLORIDE 5 MG: 5 TABLET ORAL at 17:47

## 2023-01-01 RX ADMIN — PANTOPRAZOLE SODIUM 40 MG: 40 TABLET, DELAYED RELEASE ORAL at 08:51

## 2023-01-01 RX ADMIN — AMIODARONE HYDROCHLORIDE 200 MG: 200 TABLET ORAL at 20:47

## 2023-01-01 RX ADMIN — FOLIC ACID 1 MG: 1 TABLET ORAL at 08:56

## 2023-01-01 RX ADMIN — ATORVASTATIN CALCIUM 40 MG: 40 TABLET, FILM COATED ORAL at 20:44

## 2023-01-01 RX ADMIN — APIXABAN 5 MG: 5 TABLET, FILM COATED ORAL at 08:51

## 2023-01-01 RX ADMIN — MULTIPLE VITAMINS W/ MINERALS TAB 1 TABLET: TAB at 09:25

## 2023-01-01 RX ADMIN — CEFTRIAXONE 1 G: 1 INJECTION, POWDER, FOR SOLUTION INTRAMUSCULAR; INTRAVENOUS at 09:23

## 2023-01-01 RX ADMIN — PANTOPRAZOLE SODIUM 40 MG: 40 TABLET, DELAYED RELEASE ORAL at 08:50

## 2023-01-01 RX ADMIN — APIXABAN 5 MG: 5 TABLET, FILM COATED ORAL at 08:43

## 2023-01-01 RX ADMIN — AMIODARONE HYDROCHLORIDE 200 MG: 200 TABLET ORAL at 22:00

## 2023-01-01 RX ADMIN — AMIODARONE HYDROCHLORIDE 200 MG: 200 TABLET ORAL at 07:31

## 2023-01-01 RX ADMIN — AMIODARONE HYDROCHLORIDE 200 MG: 200 TABLET ORAL at 20:51

## 2023-01-01 RX ADMIN — COSYNTROPIN 0.25 MG: 0.25 INJECTION, POWDER, LYOPHILIZED, FOR SOLUTION INTRAMUSCULAR; INTRAVENOUS at 08:25

## 2023-01-01 RX ADMIN — AMIODARONE HYDROCHLORIDE 200 MG: 200 TABLET ORAL at 16:51

## 2023-01-01 RX ADMIN — BUDESONIDE 0.5 MG: 0.5 SUSPENSION RESPIRATORY (INHALATION) at 19:34

## 2023-01-01 RX ADMIN — MIDODRINE HYDROCHLORIDE 10 MG: 5 TABLET ORAL at 03:31

## 2023-01-01 RX ADMIN — INSULIN LISPRO 3 UNITS: 100 INJECTION, SOLUTION INTRAVENOUS; SUBCUTANEOUS at 12:12

## 2023-01-01 RX ADMIN — IPRATROPIUM BROMIDE AND ALBUTEROL SULFATE 3 ML: 2.5; .5 SOLUTION RESPIRATORY (INHALATION) at 07:30

## 2023-01-01 RX ADMIN — AMIODARONE HYDROCHLORIDE 200 MG: 200 TABLET ORAL at 16:44

## 2023-01-01 RX ADMIN — AMIODARONE HYDROCHLORIDE 200 MG: 200 TABLET ORAL at 08:51

## 2023-01-01 RX ADMIN — CEFTRIAXONE 1 G: 1 INJECTION, POWDER, FOR SOLUTION INTRAMUSCULAR; INTRAVENOUS at 08:34

## 2023-01-01 RX ADMIN — INSULIN GLARGINE 15 UNITS: 100 INJECTION, SOLUTION SUBCUTANEOUS at 20:59

## 2023-01-01 RX ADMIN — AMIODARONE HYDROCHLORIDE 200 MG: 200 TABLET ORAL at 17:23

## 2023-01-01 RX ADMIN — MIDODRINE HYDROCHLORIDE 5 MG: 5 TABLET ORAL at 13:20

## 2023-01-01 RX ADMIN — Medication 10 ML: at 08:14

## 2023-01-01 RX ADMIN — Medication 100 MG: at 08:56

## 2023-01-01 RX ADMIN — INSULIN HUMAN 12 UNITS: 100 INJECTION, SOLUTION PARENTERAL at 17:08

## 2023-01-01 RX ADMIN — HEPARIN SODIUM 4500 UNITS: 1000 INJECTION INTRAVENOUS; SUBCUTANEOUS at 12:12

## 2023-01-01 RX ADMIN — PANTOPRAZOLE SODIUM 40 MG: 40 TABLET, DELAYED RELEASE ORAL at 07:48

## 2023-01-01 RX ADMIN — AMOXICILLIN AND CLAVULANATE POTASSIUM 500 MG: 500; 125 TABLET, FILM COATED ORAL at 13:15

## 2023-01-01 RX ADMIN — PANTOPRAZOLE SODIUM 40 MG: 40 TABLET, DELAYED RELEASE ORAL at 08:56

## 2023-01-01 RX ADMIN — LINAGLIPTIN 5 MG: 5 TABLET, FILM COATED ORAL at 09:25

## 2023-01-01 RX ADMIN — CLOPIDOGREL BISULFATE 75 MG: 75 TABLET ORAL at 09:25

## 2023-01-01 RX ADMIN — LEVOTHYROXINE SODIUM 25 MCG: 0.03 TABLET ORAL at 05:52

## 2023-01-01 RX ADMIN — IPRATROPIUM BROMIDE AND ALBUTEROL SULFATE 3 ML: 2.5; .5 SOLUTION RESPIRATORY (INHALATION) at 07:39

## 2023-01-01 RX ADMIN — DOCUSATE SODIUM 50MG AND SENNOSIDES 8.6MG 2 TABLET: 8.6; 5 TABLET, FILM COATED ORAL at 21:57

## 2023-01-01 RX ADMIN — AMIODARONE HYDROCHLORIDE 200 MG: 200 TABLET ORAL at 21:57

## 2023-01-01 RX ADMIN — MIDODRINE HYDROCHLORIDE 10 MG: 5 TABLET ORAL at 16:08

## 2023-01-01 RX ADMIN — DEXTROSE MONOHYDRATE 50 ML: 25 INJECTION, SOLUTION INTRAVENOUS at 06:25

## 2023-01-01 RX ADMIN — AMIODARONE HYDROCHLORIDE 200 MG: 200 TABLET ORAL at 20:26

## 2023-01-01 RX ADMIN — COSYNTROPIN 0.25 MG: 0.25 INJECTION, POWDER, LYOPHILIZED, FOR SOLUTION INTRAVENOUS at 07:10

## 2023-01-01 RX ADMIN — IPRATROPIUM BROMIDE AND ALBUTEROL SULFATE 3 ML: 2.5; .5 SOLUTION RESPIRATORY (INHALATION) at 10:30

## 2023-01-01 RX ADMIN — AMIODARONE HYDROCHLORIDE 200 MG: 200 TABLET ORAL at 08:26

## 2023-01-01 RX ADMIN — INSULIN LISPRO 2 UNITS: 100 INJECTION, SOLUTION INTRAVENOUS; SUBCUTANEOUS at 08:33

## 2023-01-01 RX ADMIN — AMOXICILLIN AND CLAVULANATE POTASSIUM 500 MG: 500; 125 TABLET, FILM COATED ORAL at 08:51

## 2023-01-01 RX ADMIN — Medication 10 ML: at 21:16

## 2023-01-01 RX ADMIN — FOLIC ACID 1 MG: 1 TABLET ORAL at 08:51

## 2023-01-01 RX ADMIN — MIDODRINE HYDROCHLORIDE 10 MG: 5 TABLET ORAL at 11:48

## 2023-01-01 RX ADMIN — AMIODARONE HYDROCHLORIDE 200 MG: 200 TABLET ORAL at 20:44

## 2023-01-01 RX ADMIN — AMIODARONE HYDROCHLORIDE 200 MG: 200 TABLET ORAL at 20:50

## 2023-01-01 RX ADMIN — PANTOPRAZOLE SODIUM 40 MG: 40 TABLET, DELAYED RELEASE ORAL at 08:55

## 2023-01-01 RX ADMIN — AMOXICILLIN AND CLAVULANATE POTASSIUM 500 MG: 500; 125 TABLET, FILM COATED ORAL at 00:16

## 2023-01-01 RX ADMIN — INSULIN GLARGINE 6 UNITS: 100 INJECTION, SOLUTION SUBCUTANEOUS at 20:26

## 2023-01-01 RX ADMIN — Medication 10 ML: at 09:26

## 2023-01-01 RX ADMIN — FUROSEMIDE 40 MG: 40 TABLET ORAL at 11:35

## 2023-01-01 RX ADMIN — Medication 10 ML: at 09:02

## 2023-01-01 RX ADMIN — ATORVASTATIN CALCIUM 40 MG: 40 TABLET, FILM COATED ORAL at 20:31

## 2023-01-01 RX ADMIN — DOBUTAMINE IN DEXTROSE 17 MCG/KG/MIN: 100 INJECTION, SOLUTION INTRAVENOUS at 04:38

## 2023-01-01 RX ADMIN — INSULIN LISPRO 7 UNITS: 100 INJECTION, SOLUTION INTRAVENOUS; SUBCUTANEOUS at 17:01

## 2023-01-01 RX ADMIN — ATORVASTATIN CALCIUM 40 MG: 40 TABLET, FILM COATED ORAL at 20:41

## 2023-01-01 RX ADMIN — INSULIN LISPRO 2 UNITS: 100 INJECTION, SOLUTION INTRAVENOUS; SUBCUTANEOUS at 09:02

## 2023-01-01 RX ADMIN — AMIODARONE HYDROCHLORIDE 200 MG: 200 TABLET ORAL at 15:57

## 2023-01-01 RX ADMIN — Medication 10 ML: at 09:06

## 2023-01-01 RX ADMIN — MIDODRINE HYDROCHLORIDE 5 MG: 5 TABLET ORAL at 12:00

## 2023-01-01 RX ADMIN — MIDODRINE HYDROCHLORIDE 10 MG: 5 TABLET ORAL at 16:57

## 2023-01-01 RX ADMIN — FOLIC ACID 1 MG: 1 TABLET ORAL at 13:03

## 2023-01-01 RX ADMIN — AMIODARONE HYDROCHLORIDE 200 MG: 200 TABLET ORAL at 07:48

## 2023-01-01 RX ADMIN — SODIUM CHLORIDE 250 ML: 9 INJECTION, SOLUTION INTRAVENOUS at 12:29

## 2023-01-01 RX ADMIN — BUMETANIDE 1 MG: 0.25 INJECTION INTRAMUSCULAR; INTRAVENOUS at 00:23

## 2023-01-01 RX ADMIN — TAMSULOSIN HYDROCHLORIDE 0.4 MG: 0.4 CAPSULE ORAL at 08:43

## 2023-01-01 RX ADMIN — TAMSULOSIN HYDROCHLORIDE 0.4 MG: 0.4 CAPSULE ORAL at 09:39

## 2023-01-01 RX ADMIN — FOLIC ACID 1 MG: 1 TABLET ORAL at 12:14

## 2023-01-01 RX ADMIN — DEXTROSE MONOHYDRATE 50 ML: 25 INJECTION, SOLUTION INTRAVENOUS at 04:37

## 2023-01-01 RX ADMIN — FOLIC ACID 1 MG: 1 TABLET ORAL at 07:48

## 2023-01-01 RX ADMIN — CLOPIDOGREL BISULFATE 75 MG: 75 TABLET ORAL at 08:32

## 2023-01-01 RX ADMIN — LEVOTHYROXINE SODIUM 25 MCG: 0.03 TABLET ORAL at 08:22

## 2023-01-01 RX ADMIN — Medication 10 ML: at 08:43

## 2023-01-01 RX ADMIN — MIDODRINE HYDROCHLORIDE 10 MG: 5 TABLET ORAL at 12:23

## 2023-01-01 RX ADMIN — MIDODRINE HYDROCHLORIDE 10 MG: 5 TABLET ORAL at 13:03

## 2023-01-01 RX ADMIN — DEXTROSE MONOHYDRATE 25 G: 25 INJECTION, SOLUTION INTRAVENOUS at 07:57

## 2023-01-01 RX ADMIN — AMIODARONE HYDROCHLORIDE 200 MG: 200 TABLET ORAL at 09:38

## 2023-01-01 RX ADMIN — PANTOPRAZOLE SODIUM 40 MG: 40 TABLET, DELAYED RELEASE ORAL at 09:02

## 2023-01-01 RX ADMIN — MIDODRINE HYDROCHLORIDE 5 MG: 5 TABLET ORAL at 08:56

## 2023-01-01 RX ADMIN — FOLIC ACID 1 MG: 1 TABLET ORAL at 13:39

## 2023-01-01 RX ADMIN — FUROSEMIDE 40 MG: 10 INJECTION, SOLUTION INTRAMUSCULAR; INTRAVENOUS at 16:53

## 2023-01-01 RX ADMIN — CLOPIDOGREL BISULFATE 75 MG: 75 TABLET ORAL at 08:56

## 2023-01-01 RX ADMIN — AMIODARONE HYDROCHLORIDE 200 MG: 200 TABLET ORAL at 09:06

## 2023-01-01 RX ADMIN — Medication 10 ML: at 20:53

## 2023-01-01 RX ADMIN — DOBUTAMINE IN DEXTROSE 17 MCG/KG/MIN: 100 INJECTION, SOLUTION INTRAVENOUS at 07:56

## 2023-01-01 RX ADMIN — AMIODARONE HYDROCHLORIDE 200 MG: 200 TABLET ORAL at 16:49

## 2023-01-01 RX ADMIN — PANTOPRAZOLE SODIUM 40 MG: 40 TABLET, DELAYED RELEASE ORAL at 09:25

## 2023-01-01 RX ADMIN — APIXABAN 5 MG: 5 TABLET, FILM COATED ORAL at 08:32

## 2023-01-01 RX ADMIN — INSULIN LISPRO 3 UNITS: 100 INJECTION, SOLUTION INTRAVENOUS; SUBCUTANEOUS at 11:51

## 2023-01-01 RX ADMIN — AMIODARONE HYDROCHLORIDE 200 MG: 200 TABLET ORAL at 20:08

## 2023-01-01 RX ADMIN — SODIUM ZIRCONIUM CYCLOSILICATE 10 G: 10 POWDER, FOR SUSPENSION ORAL at 12:13

## 2023-01-01 RX ADMIN — AMIODARONE HYDROCHLORIDE 200 MG: 200 TABLET ORAL at 08:32

## 2023-01-01 RX ADMIN — TAMSULOSIN HYDROCHLORIDE 0.4 MG: 0.4 CAPSULE ORAL at 08:32

## 2023-01-01 RX ADMIN — AMIODARONE HYDROCHLORIDE 200 MG: 200 TABLET ORAL at 20:16

## 2023-01-01 RX ADMIN — AMIODARONE HYDROCHLORIDE 200 MG: 200 TABLET ORAL at 15:21

## 2023-01-01 RX ADMIN — AMIODARONE HYDROCHLORIDE 200 MG: 200 TABLET ORAL at 09:25

## 2023-01-01 RX ADMIN — PANTOPRAZOLE SODIUM 40 MG: 40 TABLET, DELAYED RELEASE ORAL at 08:14

## 2023-01-01 RX ADMIN — ATORVASTATIN CALCIUM 40 MG: 40 TABLET, FILM COATED ORAL at 21:16

## 2023-01-01 RX ADMIN — AMIODARONE HYDROCHLORIDE 200 MG: 200 TABLET ORAL at 20:53

## 2023-01-01 RX ADMIN — APIXABAN 5 MG: 5 TABLET, FILM COATED ORAL at 08:26

## 2023-01-01 RX ADMIN — LIDOCAINE HYDROCHLORIDE 4 ML: 10 INJECTION, SOLUTION INFILTRATION; PERINEURAL at 15:09

## 2023-01-01 RX ADMIN — CLOPIDOGREL BISULFATE 75 MG: 75 TABLET ORAL at 08:51

## 2023-01-01 RX ADMIN — SODIUM ZIRCONIUM CYCLOSILICATE 10 G: 10 POWDER, FOR SUSPENSION ORAL at 13:39

## 2023-01-01 RX ADMIN — BUDESONIDE 0.5 MG: 0.5 SUSPENSION RESPIRATORY (INHALATION) at 07:43

## 2023-01-01 RX ADMIN — BUDESONIDE 0.5 MG: 0.5 SUSPENSION RESPIRATORY (INHALATION) at 07:39

## 2023-01-01 RX ADMIN — APIXABAN 5 MG: 5 TABLET, FILM COATED ORAL at 12:13

## 2023-01-01 RX ADMIN — SODIUM CHLORIDE 75 ML/HR: 9 INJECTION, SOLUTION INTRAVENOUS at 17:35

## 2023-01-01 RX ADMIN — BUMETANIDE 2 MG: 0.25 INJECTION INTRAMUSCULAR; INTRAVENOUS at 22:00

## 2023-01-01 RX ADMIN — DOPAMINE HYDROCHLORIDE IN DEXTROSE 2 MCG/KG/MIN: 1.6 INJECTION, SOLUTION INTRAVENOUS at 12:53

## 2023-01-01 RX ADMIN — CLOPIDOGREL BISULFATE 75 MG: 75 TABLET ORAL at 12:13

## 2023-01-01 RX ADMIN — AMIODARONE HYDROCHLORIDE 200 MG: 200 TABLET ORAL at 16:08

## 2023-01-01 RX ADMIN — PANTOPRAZOLE SODIUM 40 MG: 40 TABLET, DELAYED RELEASE ORAL at 08:32

## 2023-01-01 RX ADMIN — BUMETANIDE 1 MG: 0.25 INJECTION INTRAMUSCULAR; INTRAVENOUS at 12:00

## 2023-01-01 RX ADMIN — CLOPIDOGREL BISULFATE 75 MG: 75 TABLET ORAL at 08:30

## 2023-01-01 RX ADMIN — BUMETANIDE 2 MG: 0.25 INJECTION INTRAMUSCULAR; INTRAVENOUS at 21:20

## 2023-01-01 RX ADMIN — INSULIN LISPRO 4 UNITS: 100 INJECTION, SOLUTION INTRAVENOUS; SUBCUTANEOUS at 12:24

## 2023-01-01 RX ADMIN — AMIODARONE HYDROCHLORIDE 200 MG: 200 TABLET ORAL at 16:57

## 2023-01-01 RX ADMIN — AMIODARONE HYDROCHLORIDE 200 MG: 200 TABLET ORAL at 08:50

## 2023-01-01 RX ADMIN — MIDODRINE HYDROCHLORIDE 10 MG: 5 TABLET ORAL at 12:51

## 2023-01-01 RX ADMIN — PANTOPRAZOLE SODIUM 40 MG: 40 TABLET, DELAYED RELEASE ORAL at 12:14

## 2023-01-01 RX ADMIN — DEXTROSE MONOHYDRATE 25 G: 25 INJECTION, SOLUTION INTRAVENOUS at 05:56

## 2023-01-01 RX ADMIN — FENTANYL CITRATE 50 MCG: 50 INJECTION, SOLUTION INTRAMUSCULAR; INTRAVENOUS at 15:01

## 2023-01-01 RX ADMIN — Medication 100 MG: at 13:03

## 2023-01-01 RX ADMIN — MIDODRINE HYDROCHLORIDE 10 MG: 5 TABLET ORAL at 08:23

## 2023-01-01 RX ADMIN — HEPARIN SODIUM 5000 UNITS: 1000 INJECTION INTRAVENOUS; SUBCUTANEOUS at 10:11

## 2023-01-01 RX ADMIN — BUDESONIDE 0.5 MG: 0.5 SUSPENSION RESPIRATORY (INHALATION) at 20:37

## 2023-01-01 RX ADMIN — MAGNESIUM SULFATE HEPTAHYDRATE 2 G: 2 INJECTION, SOLUTION INTRAVENOUS at 16:53

## 2023-01-01 RX ADMIN — ATORVASTATIN CALCIUM 40 MG: 40 TABLET, FILM COATED ORAL at 20:50

## 2023-01-01 RX ADMIN — TAMSULOSIN HYDROCHLORIDE 0.4 MG: 0.4 CAPSULE ORAL at 11:35

## 2023-01-01 RX ADMIN — Medication 10 ML: at 09:39

## 2023-01-01 RX ADMIN — AMIODARONE HYDROCHLORIDE 200 MG: 200 TABLET ORAL at 08:55

## 2023-01-01 RX ADMIN — LIDOCAINE HYDROCHLORIDE 7 ML: 20 INJECTION, SOLUTION INFILTRATION; PERINEURAL at 15:13

## 2023-01-01 RX ADMIN — LINAGLIPTIN 5 MG: 5 TABLET, FILM COATED ORAL at 07:48

## 2023-01-01 RX ADMIN — APIXABAN 5 MG: 5 TABLET, FILM COATED ORAL at 11:35

## 2023-01-01 RX ADMIN — MIDODRINE HYDROCHLORIDE 10 MG: 5 TABLET ORAL at 08:55

## 2023-01-01 RX ADMIN — Medication 10 ML: at 21:58

## 2023-01-01 RX ADMIN — AMIODARONE HYDROCHLORIDE 200 MG: 200 TABLET ORAL at 12:13

## 2023-01-01 RX ADMIN — CLOPIDOGREL BISULFATE 75 MG: 75 TABLET ORAL at 08:43

## 2023-01-01 RX ADMIN — AMIODARONE HYDROCHLORIDE 200 MG: 200 TABLET ORAL at 08:14

## 2023-01-01 RX ADMIN — BUMETANIDE 1 MG: 1 TABLET ORAL at 08:56

## 2023-01-01 RX ADMIN — MIDODRINE HYDROCHLORIDE 10 MG: 5 TABLET ORAL at 07:48

## 2023-01-01 RX ADMIN — AMIODARONE HYDROCHLORIDE 200 MG: 200 TABLET ORAL at 09:02

## 2023-01-01 RX ADMIN — INSULIN LISPRO 3 UNITS: 100 INJECTION, SOLUTION INTRAVENOUS; SUBCUTANEOUS at 16:53

## 2023-01-01 RX ADMIN — APIXABAN 5 MG: 5 TABLET, FILM COATED ORAL at 08:56

## 2023-01-01 RX ADMIN — SODIUM ZIRCONIUM CYCLOSILICATE 10 G: 10 POWDER, FOR SUSPENSION ORAL at 18:57

## 2023-01-01 RX ADMIN — MIDODRINE HYDROCHLORIDE 10 MG: 5 TABLET ORAL at 16:33

## 2023-01-01 RX ADMIN — Medication 100 MG: at 08:29

## 2023-01-01 RX ADMIN — TORSEMIDE 20 MG: 10 TABLET ORAL at 09:06

## 2023-01-01 RX ADMIN — INSULIN LISPRO 2 UNITS: 100 INJECTION, SOLUTION INTRAVENOUS; SUBCUTANEOUS at 17:25

## 2023-01-01 RX ADMIN — BUMETANIDE 1 MG: 0.25 INJECTION INTRAMUSCULAR; INTRAVENOUS at 11:51

## 2023-01-01 RX ADMIN — INSULIN LISPRO 3 UNITS: 100 INJECTION, SOLUTION INTRAVENOUS; SUBCUTANEOUS at 18:07

## 2023-01-01 RX ADMIN — SODIUM CHLORIDE 75 ML/HR: 9 INJECTION, SOLUTION INTRAVENOUS at 20:51

## 2023-01-01 RX ADMIN — BUMETANIDE 1 MG: 0.25 INJECTION INTRAMUSCULAR; INTRAVENOUS at 00:16

## 2023-01-01 RX ADMIN — LINAGLIPTIN 5 MG: 5 TABLET, FILM COATED ORAL at 08:55

## 2023-01-01 RX ADMIN — Medication 10 ML: at 21:13

## 2023-01-01 RX ADMIN — IPRATROPIUM BROMIDE AND ALBUTEROL SULFATE 3 ML: 2.5; .5 SOLUTION RESPIRATORY (INHALATION) at 11:34

## 2023-01-01 RX ADMIN — Medication 10 ML: at 20:47

## 2023-01-01 RX ADMIN — CLOPIDOGREL BISULFATE 75 MG: 75 TABLET ORAL at 08:14

## 2023-01-01 RX ADMIN — CLOPIDOGREL BISULFATE 75 MG: 75 TABLET ORAL at 09:39

## 2023-01-01 RX ADMIN — DEXTROSE MONOHYDRATE 50 ML: 25 INJECTION, SOLUTION INTRAVENOUS at 04:28

## 2023-01-01 RX ADMIN — INSULIN LISPRO 2 UNITS: 100 INJECTION, SOLUTION INTRAVENOUS; SUBCUTANEOUS at 13:00

## 2023-01-01 RX ADMIN — MIDODRINE HYDROCHLORIDE 10 MG: 5 TABLET ORAL at 12:11

## 2023-01-01 RX ADMIN — Medication 10 ML: at 08:12

## 2023-01-01 RX ADMIN — DEXTROSE MONOHYDRATE 50 ML: 25 INJECTION, SOLUTION INTRAVENOUS at 23:52

## 2023-01-01 RX ADMIN — INSULIN LISPRO 4 UNITS: 100 INJECTION, SOLUTION INTRAVENOUS; SUBCUTANEOUS at 17:47

## 2023-01-01 RX ADMIN — BUDESONIDE 0.5 MG: 0.5 SUSPENSION RESPIRATORY (INHALATION) at 19:04

## 2023-01-01 RX ADMIN — BUMETANIDE 1 MG: 0.25 INJECTION INTRAMUSCULAR; INTRAVENOUS at 12:32

## 2023-01-01 RX ADMIN — ATORVASTATIN CALCIUM 40 MG: 40 TABLET, FILM COATED ORAL at 20:10

## 2023-01-01 RX ADMIN — DEXTROSE MONOHYDRATE 50 ML: 25 INJECTION, SOLUTION INTRAVENOUS at 02:28

## 2023-01-01 RX ADMIN — BUDESONIDE 0.5 MG: 0.5 SUSPENSION RESPIRATORY (INHALATION) at 20:35

## 2023-01-01 RX ADMIN — MULTIPLE VITAMINS W/ MINERALS TAB 1 TABLET: TAB at 13:39

## 2023-01-01 RX ADMIN — TAMSULOSIN HYDROCHLORIDE 0.4 MG: 0.4 CAPSULE ORAL at 08:12

## 2023-01-01 RX ADMIN — BUMETANIDE 2 MG: 0.25 INJECTION INTRAMUSCULAR; INTRAVENOUS at 23:49

## 2023-01-01 RX ADMIN — ATORVASTATIN CALCIUM 40 MG: 40 TABLET, FILM COATED ORAL at 20:51

## 2023-01-01 RX ADMIN — AMIODARONE HYDROCHLORIDE 200 MG: 200 TABLET ORAL at 20:31

## 2023-01-01 RX ADMIN — POVIDONE-IODINE: 10 SOLUTION TOPICAL at 12:56

## 2023-01-01 RX ADMIN — IPRATROPIUM BROMIDE AND ALBUTEROL SULFATE 3 ML: 2.5; .5 SOLUTION RESPIRATORY (INHALATION) at 11:05

## 2023-01-01 RX ADMIN — Medication 10 ML: at 07:49

## 2023-01-01 RX ADMIN — ATORVASTATIN CALCIUM 40 MG: 40 TABLET, FILM COATED ORAL at 21:57

## 2023-01-01 RX ADMIN — CLOPIDOGREL BISULFATE 75 MG: 75 TABLET ORAL at 08:55

## 2023-01-01 RX ADMIN — BUMETANIDE 1 MG: 0.25 INJECTION INTRAMUSCULAR; INTRAVENOUS at 00:09

## 2023-01-01 RX ADMIN — IPRATROPIUM BROMIDE AND ALBUTEROL SULFATE 3 ML: 2.5; .5 SOLUTION RESPIRATORY (INHALATION) at 12:02

## 2023-01-01 RX ADMIN — CEFTRIAXONE 1 G: 1 INJECTION, POWDER, FOR SOLUTION INTRAMUSCULAR; INTRAVENOUS at 08:39

## 2023-01-01 RX ADMIN — PANTOPRAZOLE SODIUM 40 MG: 40 TABLET, DELAYED RELEASE ORAL at 08:43

## 2023-01-01 RX ADMIN — CLOPIDOGREL BISULFATE 75 MG: 75 TABLET ORAL at 07:33

## 2023-01-01 RX ADMIN — BUMETANIDE 1 MG: 1 TABLET ORAL at 12:14

## 2023-01-01 RX ADMIN — AMIODARONE HYDROCHLORIDE 200 MG: 200 TABLET ORAL at 08:43

## 2023-01-01 RX ADMIN — APIXABAN 5 MG: 5 TABLET, FILM COATED ORAL at 09:02

## 2023-01-01 RX ADMIN — LEVOTHYROXINE SODIUM 25 MCG: 0.03 TABLET ORAL at 05:42

## 2023-01-01 RX ADMIN — Medication 100 MG: at 07:31

## 2023-01-01 RX ADMIN — BUDESONIDE 0.5 MG: 0.5 SUSPENSION RESPIRATORY (INHALATION) at 07:55

## 2023-01-01 RX ADMIN — CEFAZOLIN 2 G: 1 INJECTION, POWDER, FOR SOLUTION INTRAMUSCULAR; INTRAVENOUS at 14:48

## 2023-01-01 RX ADMIN — INSULIN LISPRO 2 UNITS: 100 INJECTION, SOLUTION INTRAVENOUS; SUBCUTANEOUS at 12:11

## 2023-01-01 RX ADMIN — APIXABAN 5 MG: 5 TABLET, FILM COATED ORAL at 08:50

## 2023-01-01 RX ADMIN — INSULIN LISPRO 2 UNITS: 100 INJECTION, SOLUTION INTRAVENOUS; SUBCUTANEOUS at 20:34

## 2023-01-01 RX ADMIN — Medication 10 ML: at 20:52

## 2023-01-01 RX ADMIN — IPRATROPIUM BROMIDE AND ALBUTEROL SULFATE 3 ML: 2.5; .5 SOLUTION RESPIRATORY (INHALATION) at 15:07

## 2023-01-01 RX ADMIN — MIDODRINE HYDROCHLORIDE 10 MG: 5 TABLET ORAL at 07:32

## 2023-01-01 RX ADMIN — IPRATROPIUM BROMIDE AND ALBUTEROL SULFATE 3 ML: 2.5; .5 SOLUTION RESPIRATORY (INHALATION) at 06:30

## 2023-01-01 RX ADMIN — ATORVASTATIN CALCIUM 40 MG: 40 TABLET, FILM COATED ORAL at 20:08

## 2023-01-01 RX ADMIN — DEXTROSE MONOHYDRATE 50 ML: 25 INJECTION, SOLUTION INTRAVENOUS at 06:10

## 2023-01-01 RX ADMIN — Medication 10 ML: at 08:26

## 2023-01-01 RX ADMIN — TAMSULOSIN HYDROCHLORIDE 0.4 MG: 0.4 CAPSULE ORAL at 08:51

## 2023-01-01 RX ADMIN — Medication 100 MG: at 12:14

## 2023-01-01 RX ADMIN — Medication 10 ML: at 20:27

## 2023-01-01 RX ADMIN — ATORVASTATIN CALCIUM 40 MG: 40 TABLET, FILM COATED ORAL at 20:47

## 2023-01-01 RX ADMIN — BUMETANIDE 2 MG: 0.25 INJECTION INTRAMUSCULAR; INTRAVENOUS at 18:13

## 2023-01-01 RX ADMIN — ATORVASTATIN CALCIUM 40 MG: 40 TABLET, FILM COATED ORAL at 20:45

## 2023-01-01 RX ADMIN — IPRATROPIUM BROMIDE AND ALBUTEROL SULFATE 3 ML: 2.5; .5 SOLUTION RESPIRATORY (INHALATION) at 19:34

## 2023-01-01 RX ADMIN — DEXTROSE MONOHYDRATE 75 ML/HR: 100 INJECTION, SOLUTION INTRAVENOUS at 08:26

## 2023-01-01 RX ADMIN — AMIODARONE HYDROCHLORIDE 200 MG: 200 TABLET ORAL at 15:50

## 2023-01-01 RX ADMIN — Medication 10 ML: at 20:10

## 2023-01-01 RX ADMIN — FOLIC ACID 1 MG: 1 TABLET ORAL at 08:55

## 2023-01-01 RX ADMIN — APIXABAN 5 MG: 5 TABLET, FILM COATED ORAL at 21:12

## 2023-01-01 RX ADMIN — AMOXICILLIN AND CLAVULANATE POTASSIUM 500 MG: 500; 125 TABLET, FILM COATED ORAL at 20:08

## 2023-01-01 RX ADMIN — TORSEMIDE 20 MG: 10 TABLET ORAL at 08:24

## 2023-01-01 RX ADMIN — CLOPIDOGREL BISULFATE 75 MG: 75 TABLET ORAL at 09:02

## 2023-01-01 RX ADMIN — IPRATROPIUM BROMIDE AND ALBUTEROL SULFATE 3 ML: 2.5; .5 SOLUTION RESPIRATORY (INHALATION) at 20:37

## 2023-01-01 RX ADMIN — CLOPIDOGREL BISULFATE 75 MG: 75 TABLET ORAL at 07:48

## 2023-01-01 RX ADMIN — ATORVASTATIN CALCIUM 40 MG: 40 TABLET, FILM COATED ORAL at 20:16

## 2023-01-01 RX ADMIN — BUMETANIDE 2 MG: 0.25 INJECTION INTRAMUSCULAR; INTRAVENOUS at 09:02

## 2023-01-01 RX ADMIN — Medication 100 MG: at 07:48

## 2023-01-01 RX ADMIN — IPRATROPIUM BROMIDE AND ALBUTEROL SULFATE 3 ML: .5; 2.5 SOLUTION RESPIRATORY (INHALATION) at 07:35

## 2023-01-01 RX ADMIN — TAMSULOSIN HYDROCHLORIDE 0.4 MG: 0.4 CAPSULE ORAL at 08:14

## 2023-01-01 RX ADMIN — PANTOPRAZOLE SODIUM 40 MG: 40 TABLET, DELAYED RELEASE ORAL at 11:35

## 2023-01-01 RX ADMIN — APIXABAN 5 MG: 5 TABLET, FILM COATED ORAL at 08:11

## 2023-01-01 RX ADMIN — BUMETANIDE 3 MG: 0.25 INJECTION INTRAMUSCULAR; INTRAVENOUS at 07:50

## 2023-01-01 RX ADMIN — AMIODARONE HYDROCHLORIDE 200 MG: 200 TABLET ORAL at 16:29

## 2023-01-01 RX ADMIN — CLOPIDOGREL BISULFATE 75 MG: 75 TABLET ORAL at 08:11

## 2023-01-01 RX ADMIN — DEXTROSE MONOHYDRATE 50 ML: 25 INJECTION, SOLUTION INTRAVENOUS at 08:38

## 2023-01-01 RX ADMIN — ATORVASTATIN CALCIUM 40 MG: 40 TABLET, FILM COATED ORAL at 22:00

## 2023-01-01 RX ADMIN — INSULIN LISPRO 2 UNITS: 100 INJECTION, SOLUTION INTRAVENOUS; SUBCUTANEOUS at 08:26

## 2023-01-01 RX ADMIN — Medication 10 ML: at 08:33

## 2023-01-01 RX ADMIN — MIDODRINE HYDROCHLORIDE 10 MG: 5 TABLET ORAL at 17:23

## 2023-01-01 RX ADMIN — DEXTROSE MONOHYDRATE 25 G: 25 INJECTION, SOLUTION INTRAVENOUS at 07:32

## 2023-01-01 RX ADMIN — IPRATROPIUM BROMIDE AND ALBUTEROL SULFATE 3 ML: 2.5; .5 SOLUTION RESPIRATORY (INHALATION) at 15:35

## 2023-01-01 RX ADMIN — LINAGLIPTIN 5 MG: 5 TABLET, FILM COATED ORAL at 08:29

## 2023-01-01 RX ADMIN — AMIODARONE HYDROCHLORIDE 200 MG: 200 TABLET ORAL at 17:08

## 2023-01-01 RX ADMIN — Medication 10 ML: at 08:30

## 2023-01-01 RX ADMIN — ATORVASTATIN CALCIUM 40 MG: 40 TABLET, FILM COATED ORAL at 21:12

## 2023-01-01 RX ADMIN — INSULIN LISPRO 2 UNITS: 100 INJECTION, SOLUTION INTRAVENOUS; SUBCUTANEOUS at 13:05

## 2023-01-01 RX ADMIN — Medication 10 ML: at 07:56

## 2023-01-01 RX ADMIN — Medication 100 MG: at 09:25

## 2023-01-01 RX ADMIN — ATORVASTATIN CALCIUM 40 MG: 40 TABLET, FILM COATED ORAL at 20:34

## 2023-01-01 RX ADMIN — BUMETANIDE 2 MG: 0.25 INJECTION INTRAMUSCULAR; INTRAVENOUS at 08:37

## 2023-01-01 RX ADMIN — MULTIPLE VITAMINS W/ MINERALS TAB 1 TABLET: TAB at 07:33

## 2023-02-23 PROBLEM — E16.2 HYPOGLYCEMIA: Status: ACTIVE | Noted: 2023-01-01

## 2023-02-24 PROBLEM — L89.610 UNSTAGEABLE PRESSURE ULCER OF RIGHT HEEL: Status: ACTIVE | Noted: 2023-01-01

## 2023-02-24 PROBLEM — L89.150 UNSTAGEABLE PRESSURE ULCER OF SACRAL REGION (HCC): Status: ACTIVE | Noted: 2023-01-01

## 2023-02-24 PROBLEM — L89.620 UNSTAGEABLE PRESSURE ULCER OF LEFT HEEL (HCC): Status: ACTIVE | Noted: 2023-01-01

## 2023-03-01 NOTE — PROGRESS NOTES
"  [facility] DAILY PROGRESS NOTE    Patient Identification  Name: Holger Ibrahim  Age: 77 y.o.  Sex: male  :  1945  MRN: [id number]    Date: 3/1/2023             Subjective:  Interval History: Diaz in place and urine clear ready for cystoscopy today    Objective:    Scheduled Meds:amiodarone, 200 mg, Oral, TID  apixaban, 5 mg, Oral, Daily  atorvastatin, 40 mg, Oral, Nightly  bumetanide, 2 mg, Intravenous, Q12H  clopidogrel, 75 mg, Oral, Daily  insulin lispro, 2-7 Units, Subcutaneous, TID With Meals  pantoprazole, 40 mg, Oral, Daily  sodium chloride, 10 mL, Intravenous, Q12H  tamsulosin, 0.4 mg, Oral, Daily      Continuous Infusions:   PRN Meds:•  acetaminophen **OR** acetaminophen **OR** acetaminophen  •  dextrose  •  dextrose  •  dextrose  •  glucagon (human recombinant)  •  melatonin  •  nitroglycerin  •  ondansetron  •  [COMPLETED] Insert Peripheral IV **AND** sodium chloride  •  sodium chloride  •  sodium chloride    Vital signs in last 24 hours:  Temp:  [97.1 °F (36.2 °C)-97.9 °F (36.6 °C)] 97.8 °F (36.6 °C)  Heart Rate:  [68-78] 68  Resp:  [10-18] 10  BP: ()/(58-71) 95/64    Intake/Output:    Intake/Output Summary (Last 24 hours) at 3/1/2023 1620  Last data filed at 3/1/2023 1500  Gross per 24 hour   Intake 590 ml   Output 600 ml   Net -10 ml       Exam:  BP 95/64 (BP Location: Left arm, Patient Position: Lying)   Pulse 68   Temp 97.8 °F (36.6 °C) (Oral)   Resp 10   Ht 177.8 cm (70\")   Wt 73.1 kg (161 lb 2.5 oz)   SpO2 99%   BMI 23.12 kg/m²     General Appearance:    Alert, cooperative, no distress, appears stated age               Abdomen:     Soft, ND, NT   :    No suprapubic distention, Diaz clear yellow           Bedside Cystoscopy  The plan was discussed with the patient and/or family who agreed and consented to bedside cystoscopy as described.  Prior to initiating the procedure a timeout was performed patient was identified using 2 identifiers and procedure was described, " all in the room were in agreement.  The patient was prepped and draped in the usual fashion.  Flexible cystourethroscopy was performed which revealed a patent urethra with no lesions.  Cystoscopy was performed which revealed 1+ obstructing prostate with short fossa 3+ trabeculated bladder with no tumors, large capacity bladder.  He was given a voiding trial  Patient tolerated the procedure well with no complications       Data Review:  All labs (24hrs):   Recent Results (from the past 24 hour(s))   POC Glucose Once    Collection Time: 02/28/23  5:25 PM    Specimen: Blood   Result Value Ref Range    Glucose 141 (H) 70 - 105 mg/dL   POC Glucose Once    Collection Time: 02/28/23  7:04 PM    Specimen: Blood   Result Value Ref Range    Glucose 124 (H) 70 - 105 mg/dL   POC Glucose Once    Collection Time: 03/01/23  7:23 AM    Specimen: Blood   Result Value Ref Range    Glucose 178 (H) 70 - 105 mg/dL   Comprehensive Metabolic Panel    Collection Time: 03/01/23 10:23 AM    Specimen: Blood   Result Value Ref Range    Glucose 180 (H) 65 - 99 mg/dL    BUN 70 (H) 8 - 23 mg/dL    Creatinine 3.74 (H) 0.76 - 1.27 mg/dL    Sodium 130 (L) 136 - 145 mmol/L    Potassium 4.7 3.5 - 5.2 mmol/L    Chloride 95 (L) 98 - 107 mmol/L    CO2 23.0 22.0 - 29.0 mmol/L    Calcium 8.3 (L) 8.6 - 10.5 mg/dL    Total Protein 6.7 6.0 - 8.5 g/dL    Albumin 2.7 (L) 3.5 - 5.2 g/dL    ALT (SGPT) 18 1 - 41 U/L    AST (SGOT) 22 1 - 40 U/L    Alkaline Phosphatase 114 39 - 117 U/L    Total Bilirubin <0.2 0.0 - 1.2 mg/dL    Globulin 4.0 gm/dL    A/G Ratio 0.7 g/dL    BUN/Creatinine Ratio 18.7 7.0 - 25.0    Anion Gap 12.0 5.0 - 15.0 mmol/L    eGFR 16.1 (L) >60.0 mL/min/1.73   Magnesium    Collection Time: 03/01/23 10:23 AM    Specimen: Blood   Result Value Ref Range    Magnesium 1.8 1.6 - 2.4 mg/dL   Phosphorus    Collection Time: 03/01/23 10:23 AM    Specimen: Blood   Result Value Ref Range    Phosphorus 5.6 (H) 2.5 - 4.5 mg/dL   CBC Auto Differential     Collection Time: 03/01/23 10:23 AM    Specimen: Blood   Result Value Ref Range    WBC 3.30 (L) 3.40 - 10.80 10*3/mm3    RBC 2.75 (L) 4.14 - 5.80 10*6/mm3    Hemoglobin 8.0 (L) 13.0 - 17.7 g/dL    Hematocrit 25.3 (L) 37.5 - 51.0 %    MCV 91.9 79.0 - 97.0 fL    MCH 29.0 26.6 - 33.0 pg    MCHC 31.5 31.5 - 35.7 g/dL    RDW 15.3 12.3 - 15.4 %    RDW-SD 49.0 37.0 - 54.0 fl    MPV 9.1 6.0 - 12.0 fL    Platelets 338 140 - 450 10*3/mm3    Neutrophil % 80.8 (H) 42.7 - 76.0 %    Lymphocyte % 10.8 (L) 19.6 - 45.3 %    Monocyte % 6.8 5.0 - 12.0 %    Eosinophil % 1.0 0.3 - 6.2 %    Basophil % 0.6 0.0 - 1.5 %    Neutrophils, Absolute 2.70 1.70 - 7.00 10*3/mm3    Lymphocytes, Absolute 0.40 (L) 0.70 - 3.10 10*3/mm3    Monocytes, Absolute 0.20 0.10 - 0.90 10*3/mm3    Eosinophils, Absolute 0.00 0.00 - 0.40 10*3/mm3    Basophils, Absolute 0.00 0.00 - 0.20 10*3/mm3    nRBC 0.2 0.0 - 0.2 /100 WBC   POC Glucose Once    Collection Time: 03/01/23 11:22 AM    Specimen: Blood   Result Value Ref Range    Glucose 199 (H) 70 - 105 mg/dL      Imaging Results (Last 24 Hours)     ** No results found for the last 24 hours. **           Assessment:    Hypoglycemia    Unstageable pressure ulcer of sacral region (HCC)    Unstageable pressure ulcer of right heel (HCC)    Unstageable pressure ulcer of left heel (HCC)    Large volume retention  BPH with obstruction  Phimosis     Plan:      Cystoscopy and voiding trial today  Continue Flomax  Replace Diaz if residual greater than 500 cc  We will plan UroLift as outpatient if still retaining    Prince Frazier MD  [facility]  [address], [address]  [place] [address]  Office: [phone]  Cell: [phone]  Also available via Epic Secure Chat  03/01/23  16:20 EST

## 2023-03-01 NOTE — PROGRESS NOTES
[facility] Daily Progress Note    Patient Name: Holger Ibrahim  : 1945  MRN: [id number]  Primary Care Physician:  Rachel Mckeon MD  Date of admission: 2023      Subjective      Chief Complaint: Hypoglycemia from [facility]        Patient Reports he is doing all right.  No complaints.  Denies any pain.  Glucose has been stable.  Renal function slightly worse.     ROS negative except as above    Objective      Vitals:   Temp:  [97.1 °F (36.2 °C)-97.9 °F (36.6 °C)] 97.8 °F (36.6 °C)  Heart Rate:  [68-78] 68  Resp:  [10-18] 10  BP: ()/(58-71) 95/64    Vitals reviewed.   Constitutional:       Comments: Multiple pressure injury wounds including feet. Diaz in place  HENT:      Head: Normocephalic.   Cardiovascular:      Rate and Rhythm: Normal rate.   Pulmonary:      Effort: Pulmonary effort is normal.   Abdominal:      General: Abdomen is flat.      Palpations: Abdomen is soft.   Musculoskeletal:         General: Normal range of motion.      Cervical back: Normal range of motion.   Skin:     General: Skin is warm.   Neurological:      General: No focal deficit present.      Mental Status: He is alert and oriented to person, place, and time.   Psychiatric:         Mood and Affect: Mood normal.         Behavior: Behavior normal.     Result Review    Result Review:  I have personally reviewed the results from the time of this admission to 3/1/2023 15:54 EST and agree with these findings:  [x]  Laboratory  []  Microbiology  []  Radiology  []  EKG/Telemetry   []  Cardiology/Vascular   []  Pathology  []  Old records  []  Other:  Most notable findings include: BUN 70 creatinine 3.7    Wounds (last 24 hours)     LDA Wound     Row Name 23 0800 23 2000          Wound 23 1437 medial coccyx Pressure Injury    Wound - Properties Group Placement Date: 23  - Placement Time: 143  - Present on Hospital Admission: Y  -SH Orientation: medial  -  Location: coccyx  -SH Primary Wound Type: Pressure inj  -SH Additional Comments: stage 2  -SH    Pressure Injury Stage 2  -BS 2  -JM     Dressing Appearance dry;intact  -BS intact;dry  -JM     Closure Adhesive bandage  -BS --     Base pink;white  -BS --     Drainage Amount none  -BS --     Dressing Care dressing reinforced  -BS --     Retired Wound - Properties Group Placement Date: 02/23/23  -SH Placement Time: 1437 -SH Present on Hospital Admission: Y  -SH Orientation: medial  -SH Location: coccyx  -SH Primary Wound Type: Pressure inj  -SH Additional Comments: stage 2  -SH    Retired Wound - Properties Group Date first assessed: 02/23/23  -SH Time first assessed: 1437 -SH Present on Hospital Admission: Y  -SH Location: coccyx  -SH Primary Wound Type: Pressure inj  -SH Additional Comments: stage 2  -SH       Wound 02/23/23 1712 Right posterior heel Pressure Injury    Wound - Properties Group Placement Date: 02/23/23  -BS Placement Time: 1712 -BS Present on Hospital Admission: Y  -BS Side: Right  -BS Orientation: posterior  -BS Location: heel  -BS Primary Wound Type: Pressure inj  -BS    Dressing Appearance -- open to air  -JM     Closure Open to air  -BS --     Base maroon/purple  -BS --     Periwound blanchable  -BS --     Drainage Amount none  -BS --     Dressing Care open to air  -BS --     Retired Wound - Properties Group Placement Date: 02/23/23  -BS Placement Time: 1712  -BS Present on Hospital Admission: Y  -BS Side: Right  -BS Orientation: posterior  -BS Location: heel  -BS Primary Wound Type: Pressure inj  -BS    Retired Wound - Properties Group Date first assessed: 02/23/23  -BS Time first assessed: 1712  -BS Present on Hospital Admission: Y  -BS Side: Right  -BS Location: heel  -BS Primary Wound Type: Pressure inj  -BS       Wound 02/23/23 1715 Left posterior heel    Wound - Properties Group Placement Date: 02/23/23  -BS Placement Time: 1715 -BS Side: Left  -BS Orientation: posterior  -BS Location:  heel  -BS    Dressing Appearance -- open to air  -JM     Closure Open to air  -BS --     Base pink;dry  -BS --     Edges callused  -BS --     Drainage Amount none  -BS --     Dressing Care open to air  -BS --     Retired Wound - Properties Group Placement Date: 02/23/23  -BS Placement Time: 1715  -BS Side: Left  -BS Orientation: posterior  -BS Location: heel  -BS    Retired Wound - Properties Group Date first assessed: 02/23/23  -BS Time first assessed: 1715  -BS Side: Left  -BS Location: heel  -BS          User Key  (r) = Recorded By, (t) = Taken By, (c) = Cosigned By    Initials Name Provider Type    Cary Jurado, RN Registered Nurse    Yudi Mireles RN Registered Nurse    Ledy Saul LPN Licensed Nurse                          Assessment & Plan       Brief Patient Summary:  Holger Ibrahim is a 77 y.o. male who presents with hypoglycemia from [facility]             amiodarone, 200 mg, Oral, TID  apixaban, 5 mg, Oral, Daily  atorvastatin, 40 mg, Oral, Nightly  clopidogrel, 75 mg, Oral, Daily  pantoprazole, 40 mg, Oral, Daily  sodium chloride, 10 mL, Intravenous, Q12H  tamsulosin, 0.4 mg, Oral, Daily               Active Hospital Problems:          Active Hospital Problems     Diagnosis     • **Hypoglycemia     • Unstageable pressure ulcer of sacral region (HCC)     • Unstageable pressure ulcer of right heel (HCC)     • Unstageable pressure ulcer of left heel (HCC)        Plan:   Acute hypoxia on 2L  CXR ordered 2/25  Respiratory panel negative     +stool for blood  Monitor Hb   Stable 7-8 range     Hypoglycemia  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  Lokelma ordered. K+ normal  - sodium finally 130+ on 3/1  --Bun/cr still not plateued     Iron deficiency anemia  - Hemoglobin 8.3, appears to be chronic  - Occult stool  - Monitor labs     HTN  HLD  - /71  - Lipid panel ordered  - Continue statin and Norvasc     Diastolic dysfunction  Nonischemic cardiomyopathy  Aortic stenosis  Pitting edema  - Sees Dr. Rasheed outpatient  - Recently at [facility] and underwent a cardiac cath, was deemed too frail to undergo a TAVR at that time, especially with progressive renal insufficiency, follows heart failure clinic and they are still proceeding with work-up for possible TAVR in the near future.  - According to care everywhere records last 2D echo 2/1/23 showed EF 33% with moderate to severe aortic stenosis  - Most recently admitted to [facility] in January due to anasarca  - Continue home diuretics, may follow nephrology recommendations  - EKG ordered and shows normal sinus rhythm  - Check proBNP  - Palliative team consulted     Urinary retention  - Has chronic Diaz catheter to bedside drainage, present on admission  - According to care everywhere records was followed by urology at recent admission to [facility]  - Continue home Flomax  - Check a U/A  -urology consulted for phimosis 2/27. Pending cysto     Proximal A-fib  - EKG showed NSR  - Continue home amiodarone and Eliquis     DVT prophylaxis  -Continue home Eliquis     COPD  Pulmonary nodule: 13mm RLL with an internal solid 3mm nodule, and a 6mm RLL  Recent COVID infection requiring hospitalization at [facility] January 2023  Current smoker  - Patient had a hospital follow up with pulmonologist, Dr. Cobian 1/26/2023 and was sent to ER for admission due to anasarca  - Pulmonary note reviewed in care everywhere  - Currently on room air and denies shortness  of breath  - Smoking cessation education     Right leg claudication s/p popliteal artery angioplasty 2019     D10 drip stopped monitor glucose        Return to [facility] once sodium stable and nephro/urology clears      CODE STATUS: DNR  Lengthy discussion had with patient on CODE STATUS.  He is alert oriented x3.     Admission Status:  I believe this patient meets inpatient status.     I discussed the patient's findings and my recommendations with patient.       -sodium  Finally 130+ on 3/1     Iron deficiency anemia  - Hemoglobin 8.3, appears to be chronic  - Occult stool  - Monitor labs     HTN  HLD  - /71  - Lipid panel ordered  - Continue statin and Norvasc     Diastolic dysfunction  Nonischemic cardiomyopathy  Aortic stenosis  Pitting edema  - Sees Dr. Rasheed outpatient  - Recently at [facility] and underwent a cardiac cath, was deemed too frail to undergo a TAVR at that time, especially with progressive renal insufficiency, follows heart failure clinic and they are still proceeding with work-up for possible TAVR in the near future.  - According to care everywhere records last 2D echo 2/1/23 showed EF 33% with moderate to severe aortic stenosis  - Most recently admitted to [facility] in January due to anasarca  - Continue home diuretics, may follow nephrology recommendations  - EKG ordered and shows normal sinus rhythm  - Check proBNP  - Palliative team consulted     Urinary retention  - Has chronic Diaz catheter to bedside drainage, present on admission  - According to care everywhere records was followed by urology at recent admission to [facility]  - Continue home Flomax  - Check a U/A  -urology consulted for phimosis 2/27. Pending cysto     Proximal A-fib  - EKG showed NSR  - Continue home amiodarone and Eliquis     DVT prophylaxis  -Continue home Eliquis     COPD  Pulmonary nodule: 13mm RLL with an internal solid 3mm nodule, and a 6mm RLL  Recent COVID infection requiring hospitalization  at [facility] January 2023  Current smoker  - Patient had a hospital follow up with pulmonologist, Dr. Cobian 1/26/2023 and was sent to ER for admission due to anasarca  - Pulmonary note reviewed in care everywhere  - Currently on room air and denies shortness of breath  - Smoking cessation education     Right leg claudication s/p popliteal artery angioplasty 2019     D10 drip stopped monitor glucose        Return to [facility] once sodium stable and urology clears      CODE STATUS: DNR  Lengthy discussion had with patient on CODE STATUS.  He is alert oriented x3.     Admission Status:  I believe this patient meets inpatient status.     I discussed the patient's findings and my recommendations with patient.     03/01/23      Electronically signed by Segun Rodrigues MD, 03/01/23, 15:54 EST.  [facility]ist [facility]

## 2023-03-01 NOTE — PLAN OF CARE
Goal Outcome Evaluation:  Plan of Care Reviewed With: patient        Progress: no change  Outcome Evaluation: Pt A&Ox4, able to voice needs and wants. Pt asst x 1. Takes meds whole. Pt is continent of bowel, has chronic F/C that was placed prior to admission for urinary retention. Cath care completed. Pt on RA, but has 2L NC prn to maintain saturation. Urology and Nephrology are still following, Endocrinology has signed off. Pt is to have a cystoscopy and voiding trial today. He is being diuresed and nephrology discussed the possible need for dialysis with him today. Pt is resting comfortably in bed with call bell within reach, no c/o pain or discomfort at this time. Pt will return to [facility] when medically ready.

## 2023-03-01 NOTE — PROGRESS NOTES
Nutrition Services    Patient Name: Holger Ibrahim  YOB: 1945  MRN: [id number]  Admission date: 2/23/2023    PPE Documentation        PPE Worn By Provider Did not enter room for this encounter   PPE Worn By Patient  N/A     PROGRESS NOTE      Encounter Information: Progress note to check on PO intakes. Pt with improving intake; averaging about 75% at recent meals. ONS also are in place to help meet needs. Would benefit from diet modification for lower phosphorus provision. Will modify diet.       PO Diet: Diet: Cardiac Diets, Diabetic Diets; Healthy Heart (2-3 Na+); Consistent Carbohydrate; Texture: Regular Texture (IDDSI 7); Fluid Consistency: Thin (IDDSI 0)   PO Supplements: % at all recent meals    PO Intake:  Novasource Renal BID (Provides 950 kcals, 43 g protein if consumed)          Current nutrition support:    Nutrition support review:        Labs (reviewed below): Hyperphosphatemia - will modify diet   Hyponatremia - ongoing; nephrology following  BUN/Cr elevated - C/W renal status   Glucose elevated - C/W Hx DM         GI Function:         Nutrition Intervention Updates: Modify diet to  Consistent Carbohydrate, Low Phosphosphorus       Results from last 7 days   Lab Units 03/01/23  1023 02/28/23  1601 02/27/23  1035   SODIUM mmol/L 130* 130* 128*  128*   POTASSIUM mmol/L 4.7 5.3* 5.0  5.0   CHLORIDE mmol/L 95* 94* 94*  94*   CO2 mmol/L 23.0 24.0 23.0  23.0   BUN mg/dL 70* 68* 65*  65*   CREATININE mg/dL 3.74* 3.65* 3.27*  3.27*   CALCIUM mg/dL 8.3* 8.3* 8.0*  8.0*   BILIRUBIN mg/dL <0.2 <0.2 0.2   ALK PHOS U/L 114 118* 113   ALT (SGPT) U/L 18 20 19   AST (SGOT) U/L 22 23 30   GLUCOSE mg/dL 180* 108* 196*  196*     Results from last 7 days   Lab Units 03/01/23  1023 02/28/23  1601 02/27/23  1035 02/24/23  1047 02/23/23  0459   MAGNESIUM mg/dL 1.8 2.0 1.8   < > 1.7   PHOSPHORUS mg/dL 5.6* 5.4* 5.2*  5.2*   < >  --    HEMOGLOBIN g/dL 8.0* 8.7* 7.8*   < > 8.3*   HEMATOCRIT %  25.3* 26.0* 24.0*   < > 26.5*   TRIGLYCERIDES mg/dL  --   --   --   --  26    < > = values in this interval not displayed.     COVID19   Date Value Ref Range Status   02/25/2023 Not Detected Not Detected - Ref. Range Final     Lab Results   Component Value Date    HGBA1C 7.4 (H) 02/23/2023     RD to follow up per protocol.    Electronically signed by:  Cass Addison RD  03/01/23 15:51 EST

## 2023-03-02 NOTE — PLAN OF CARE
"Goal Outcome Evaluation:              Outcome Evaluation: Pt is a 78 y/o male that is a [facility] resident with \"seizure-like\" activity.  Blood sugar 30 at [facility].  Chest X-ray:  B extensive multi-focal infiltrates.  Dx:  Hypoglycemia.  Pt reports he ambulated with RW at [facility].  Pt on room air with telemetry this date.  Pt required CGA/Min A for bed mobility, CGA/Min A for transfers with RW and ambulated 40' with RW with CGA.  Pt fixated on \"I want to talk to the Dr because I need to leave for a couple of hours to pay the bills\".  Recommend return back to [facility] with skilled PT.  "

## 2023-03-02 NOTE — PROGRESS NOTES
[facility] Progress Note      Patient Name: Holger Ibrahim  : 1945  MRN: [id number]  Primary Care Physician:  Rachel Mckeon MD  Date of admission: 2023    Subjective     Interval History:     Patient resting comfortably Diaz catheter removed.  Good urine output per patient  Scrotal edema improving per patient  Dyspnea improving.  Denies any chest pain, nausea or vomiting    Objective     Vitals:   Temp:  [97.1 °F (36.2 °C)-98.4 °F (36.9 °C)] 98.4 °F (36.9 °C)  Heart Rate:  [68-79] 72  Resp:  [10-14] 11  BP: ()/(63-88) 146/88    Intake/Output Summary (Last 24 hours) at 3/2/2023 0850  Last data filed at 3/2/2023 0751  Gross per 24 hour   Intake 1060 ml   Output 400 ml   Net 660 ml       Physical Exam:    General Appearance: NAD  HEENT: oral mucosa normal, nonicteric sclera  Neck: supple, no JVD  Lungs: CTA  Heart: RRR, normal S1 and S2  Abdomen: soft, nondistended  Extremities: no edema  Neuro: Awake alert and moving all extremities    Scheduled Meds:     amiodarone, 200 mg, Oral, TID  apixaban, 5 mg, Oral, Daily  atorvastatin, 40 mg, Oral, Nightly  bumetanide, 2 mg, Intravenous, Q12H  clopidogrel, 75 mg, Oral, Daily  insulin lispro, 2-7 Units, Subcutaneous, TID With Meals  pantoprazole, 40 mg, Oral, Daily  sodium chloride, 10 mL, Intravenous, Q12H  tamsulosin, 0.4 mg, Oral, Daily      IV Meds:        Results Reviewed:   I have personally reviewed the results from the time of this admission to 3/2/2023 08:50 EST     Results from last 7 days   Lab Units 23  0438 23  1023 23  1601   SODIUM mmol/L 130* 130* 130*   POTASSIUM mmol/L 4.5 4.7 5.3*   CHLORIDE mmol/L 94* 95* 94*   CO2 mmol/L 23.0 23.0 24.0   BUN mg/dL 71* 70* 68*   CREATININE mg/dL 4.18* 3.74* 3.65*   CALCIUM mg/dL 8.1* 8.3* 8.3*   BILIRUBIN mg/dL <0.2 <0.2 <0.2   ALK PHOS U/L 107 114 118*   ALT (SGPT) U/L 18 18 20   AST (SGOT) U/L 23 22 23   GLUCOSE mg/dL 211* 180* 108*     Estimated  Creatinine Clearance: 15.8 mL/min (A) (by C-G formula based on SCr of 4.18 mg/dL (H)).  Results from last 7 days   Lab Units 03/02/23  0438 03/01/23  1023 02/28/23  1601   MAGNESIUM mg/dL 1.7 1.8 2.0   PHOSPHORUS mg/dL 5.7* 5.6* 5.4*         Results from last 7 days   Lab Units 03/02/23  0438 03/01/23  1023 02/28/23  1601 02/27/23  1035 02/26/23  1132   WBC 10*3/mm3 3.20* 3.30* 3.60 3.60 5.10   HEMOGLOBIN g/dL 7.3* 8.0* 8.7* 7.8* 8.3*   PLATELETS 10*3/mm3 323 338 336 296 316           Assessment / Plan     ASSESSMENT:  1. Acute kidney injury initially thought to be secondary to cardiorenal syndrome.  Creatinine increasing again with diuresis but volume status generous.  Electrolytes okay  2. Chronic kidney disease stage III creatinine baseline around 1.6-1.8 mg/dL  3. Obstructive uropathy.  Improving.  Diaz catheter removed.  Urology following  4. Volume overload.  Improving with diuresis  5. Hyponatremia likely due to hypovolemia.  Stable  6. History of hypertension with CKD.  Blood pressure running low at this time.  Off antihypertensives  7. History of diabetes mellitus with CKD  8. History of aortic stenosis  9.  Anemia.  Hemoglobin 7.3 this morning     PLAN:    Continue IV Bumex for now  Follow iron studies  Discussed with patient in detail and informed that renal function may worsen diuresis.  May also need to start dialysis. patient agreeable    Emanuel Beauchamp MD  03/02/23  08:50 EST    [facility]  [phone]

## 2023-03-02 NOTE — PLAN OF CARE
Goal Outcome Evaluation:   Pt had rader cath removed during day shift.  Pt has voided.  Pt denies any pain or discomfort.  Will continue to monitor pt status.

## 2023-03-02 NOTE — PROGRESS NOTES
"  [facility] PROGRESS NOTE    Patient Identification  Name: Holger Ibrahim  Age: 77 y.o.  Sex: male  :  1945  MRN: [id number]    Date: 3/2/2023             Subjective:  Interval History: voiding    Objective:    Scheduled Meds:amiodarone, 200 mg, Oral, TID  apixaban, 5 mg, Oral, Daily  atorvastatin, 40 mg, Oral, Nightly  bumetanide, 2 mg, Intravenous, Q12H  clopidogrel, 75 mg, Oral, Daily  insulin lispro, 2-7 Units, Subcutaneous, TID With Meals  pantoprazole, 40 mg, Oral, Daily  sodium chloride, 10 mL, Intravenous, Q12H  tamsulosin, 0.4 mg, Oral, Daily      Continuous Infusions:   PRN Meds:•  acetaminophen **OR** acetaminophen **OR** acetaminophen  •  dextrose  •  dextrose  •  dextrose  •  glucagon (human recombinant)  •  melatonin  •  nitroglycerin  •  ondansetron  •  [COMPLETED] Insert Peripheral IV **AND** sodium chloride  •  sodium chloride  •  sodium chloride    Vital signs in last 24 hours:  Temp:  [97.1 °F (36.2 °C)-98.4 °F (36.9 °C)] 98.4 °F (36.9 °C)  Heart Rate:  [68-79] 72  Resp:  [10-14] 11  BP: ()/(63-88) 146/88    Intake/Output:    Intake/Output Summary (Last 24 hours) at 3/2/2023 0815  Last data filed at 3/2/2023 0751  Gross per 24 hour   Intake 1060 ml   Output 400 ml   Net 660 ml       Exam:  /88   Pulse 72   Temp 98.4 °F (36.9 °C) (Oral)   Resp 11   Ht 177.8 cm (70\")   Wt 73.1 kg (161 lb 2.5 oz)   SpO2 97%   BMI 23.12 kg/m²     General Appearance:    Alert, cooperative, no distress, appears stated age               Abdomen:     Soft, ND   :    No suprapubic distention          Data Review:  All labs (24hrs):   Recent Results (from the past 24 hour(s))   Comprehensive Metabolic Panel    Collection Time: 23 10:23 AM    Specimen: Blood   Result Value Ref Range    Glucose 180 (H) 65 - 99 mg/dL    BUN 70 (H) 8 - 23 mg/dL    Creatinine 3.74 (H) 0.76 - 1.27 mg/dL    Sodium 130 (L) 136 - 145 mmol/L    Potassium 4.7 3.5 - 5.2 mmol/L    Chloride 95 (L) 98 - 107 " mmol/L    CO2 23.0 22.0 - 29.0 mmol/L    Calcium 8.3 (L) 8.6 - 10.5 mg/dL    Total Protein 6.7 6.0 - 8.5 g/dL    Albumin 2.7 (L) 3.5 - 5.2 g/dL    ALT (SGPT) 18 1 - 41 U/L    AST (SGOT) 22 1 - 40 U/L    Alkaline Phosphatase 114 39 - 117 U/L    Total Bilirubin <0.2 0.0 - 1.2 mg/dL    Globulin 4.0 gm/dL    A/G Ratio 0.7 g/dL    BUN/Creatinine Ratio 18.7 7.0 - 25.0    Anion Gap 12.0 5.0 - 15.0 mmol/L    eGFR 16.1 (L) >60.0 mL/min/1.73   Magnesium    Collection Time: 03/01/23 10:23 AM    Specimen: Blood   Result Value Ref Range    Magnesium 1.8 1.6 - 2.4 mg/dL   Phosphorus    Collection Time: 03/01/23 10:23 AM    Specimen: Blood   Result Value Ref Range    Phosphorus 5.6 (H) 2.5 - 4.5 mg/dL   CBC Auto Differential    Collection Time: 03/01/23 10:23 AM    Specimen: Blood   Result Value Ref Range    WBC 3.30 (L) 3.40 - 10.80 10*3/mm3    RBC 2.75 (L) 4.14 - 5.80 10*6/mm3    Hemoglobin 8.0 (L) 13.0 - 17.7 g/dL    Hematocrit 25.3 (L) 37.5 - 51.0 %    MCV 91.9 79.0 - 97.0 fL    MCH 29.0 26.6 - 33.0 pg    MCHC 31.5 31.5 - 35.7 g/dL    RDW 15.3 12.3 - 15.4 %    RDW-SD 49.0 37.0 - 54.0 fl    MPV 9.1 6.0 - 12.0 fL    Platelets 338 140 - 450 10*3/mm3    Neutrophil % 80.8 (H) 42.7 - 76.0 %    Lymphocyte % 10.8 (L) 19.6 - 45.3 %    Monocyte % 6.8 5.0 - 12.0 %    Eosinophil % 1.0 0.3 - 6.2 %    Basophil % 0.6 0.0 - 1.5 %    Neutrophils, Absolute 2.70 1.70 - 7.00 10*3/mm3    Lymphocytes, Absolute 0.40 (L) 0.70 - 3.10 10*3/mm3    Monocytes, Absolute 0.20 0.10 - 0.90 10*3/mm3    Eosinophils, Absolute 0.00 0.00 - 0.40 10*3/mm3    Basophils, Absolute 0.00 0.00 - 0.20 10*3/mm3    nRBC 0.2 0.0 - 0.2 /100 WBC   POC Glucose Once    Collection Time: 03/01/23 11:22 AM    Specimen: Blood   Result Value Ref Range    Glucose 199 (H) 70 - 105 mg/dL   POC Glucose Once    Collection Time: 03/01/23  4:47 PM    Specimen: Blood   Result Value Ref Range    Glucose 120 (H) 70 - 105 mg/dL   POC Glucose Once    Collection Time: 03/01/23  7:09 PM     Specimen: Blood   Result Value Ref Range    Glucose 215 (H) 70 - 105 mg/dL   POC Glucose Once    Collection Time: 03/02/23  3:32 AM    Specimen: Blood   Result Value Ref Range    Glucose 229 (H) 70 - 105 mg/dL   Comprehensive Metabolic Panel    Collection Time: 03/02/23  4:38 AM    Specimen: Blood   Result Value Ref Range    Glucose 211 (H) 65 - 99 mg/dL    BUN 71 (H) 8 - 23 mg/dL    Creatinine 4.18 (H) 0.76 - 1.27 mg/dL    Sodium 130 (L) 136 - 145 mmol/L    Potassium 4.5 3.5 - 5.2 mmol/L    Chloride 94 (L) 98 - 107 mmol/L    CO2 23.0 22.0 - 29.0 mmol/L    Calcium 8.1 (L) 8.6 - 10.5 mg/dL    Total Protein 6.3 6.0 - 8.5 g/dL    Albumin 2.3 (L) 3.5 - 5.2 g/dL    ALT (SGPT) 18 1 - 41 U/L    AST (SGOT) 23 1 - 40 U/L    Alkaline Phosphatase 107 39 - 117 U/L    Total Bilirubin <0.2 0.0 - 1.2 mg/dL    Globulin 4.0 gm/dL    A/G Ratio 0.6 g/dL    BUN/Creatinine Ratio 17.0 7.0 - 25.0    Anion Gap 13.0 5.0 - 15.0 mmol/L    eGFR 14.1 (L) >60.0 mL/min/1.73   Magnesium    Collection Time: 03/02/23  4:38 AM    Specimen: Blood   Result Value Ref Range    Magnesium 1.7 1.6 - 2.4 mg/dL   Phosphorus    Collection Time: 03/02/23  4:38 AM    Specimen: Blood   Result Value Ref Range    Phosphorus 5.7 (H) 2.5 - 4.5 mg/dL   CBC Auto Differential    Collection Time: 03/02/23  4:38 AM    Specimen: Blood   Result Value Ref Range    WBC 3.20 (L) 3.40 - 10.80 10*3/mm3    RBC 2.51 (L) 4.14 - 5.80 10*6/mm3    Hemoglobin 7.3 (L) 13.0 - 17.7 g/dL    Hematocrit 22.6 (L) 37.5 - 51.0 %    MCV 90.0 79.0 - 97.0 fL    MCH 29.0 26.6 - 33.0 pg    MCHC 32.2 31.5 - 35.7 g/dL    RDW 15.4 12.3 - 15.4 %    RDW-SD 50.8 37.0 - 54.0 fl    MPV 9.2 6.0 - 12.0 fL    Platelets 323 140 - 450 10*3/mm3    Neutrophil % 81.6 (H) 42.7 - 76.0 %    Lymphocyte % 10.4 (L) 19.6 - 45.3 %    Monocyte % 5.6 5.0 - 12.0 %    Eosinophil % 1.5 0.3 - 6.2 %    Basophil % 0.9 0.0 - 1.5 %    Neutrophils, Absolute 2.60 1.70 - 7.00 10*3/mm3    Lymphocytes, Absolute 0.30 (L) 0.70 - 3.10  10*3/mm3    Monocytes, Absolute 0.20 0.10 - 0.90 10*3/mm3    Eosinophils, Absolute 0.00 0.00 - 0.40 10*3/mm3    Basophils, Absolute 0.00 0.00 - 0.20 10*3/mm3    nRBC 0.2 0.0 - 0.2 /100 WBC   POC Glucose Once    Collection Time: 03/02/23  7:50 AM    Specimen: Blood   Result Value Ref Range    Glucose 309 (H) 70 - 105 mg/dL      Imaging Results (Last 24 Hours)     ** No results found for the last 24 hours. **           Assessment:    Hypoglycemia    Unstageable pressure ulcer of sacral region (HCC)    Unstageable pressure ulcer of right heel (HCC)    Unstageable pressure ulcer of left heel (HCC)    Large volume retention  BPH with obstruction  Phimosis     Plan:      Continue Flomax, voiding, monitor UOP and Cr  Replace Diaz if residual greater than 500 cc  We will plan UroLift as outpatient if still retaining  Ok for discharge from  standpoint    Prince Frazier MD  [facility]  [address] [facility], [address]  [place] [address]  Office: [phone]  Cell: [phone]  Also available via Epic Secure Chat  03/02/23  08:15 EST

## 2023-03-02 NOTE — THERAPY EVALUATION
Patient Name: Holger Ibrahim  : 1945    MRN: [id number]                              Today's Date: 3/2/2023       Admit Date: 2023    Visit Dx:     ICD-10-CM ICD-9-CM   1. Hypoglycemia  E16.2 251.2     Patient Active Problem List   Diagnosis   • Aortic stenosis, moderate   • Abnormal ankle brachial index ([facility])   • PVD (peripheral vascular disease) with claudication ([facility])   • Diastolic dysfunction   • Essential hypertension   • Hypoglycemia   • Unstageable pressure ulcer of sacral region (HCC)   • Unstageable pressure ulcer of right heel (HCC)   • Unstageable pressure ulcer of left heel (HCC)     Past Medical History:   Diagnosis Date   • Alcoholism (HCC)     hx of   • DM2 (diabetes mellitus, type 2) (HCC)    • Hyperlipidemia    • Hypertension    • PVD (peripheral vascular disease) ([facility])     PTA      Past Surgical History:   Procedure Laterality Date   • APPENDECTOMY     • CARDIAC CATHETERIZATION N/A 2019    Procedure: PERIPHERAL ANGIOGRAPHY, Rt popliteal PTA;  Surgeon: Jonny Ferguson MD;  Location:  INVASIVE LOCATION;  Service: Cardiovascular   • EYE SURGERY Right    • HERNIA REPAIR     • LEG SURGERY  2020   • POPLITEAL ARTERY ANGIOPLASTY Right 2017   • SPLENECTOMY      partial spleen removal      General Information     Row Name 23 1336          Physical Therapy Time and Intention    Document Type evaluation  -AM     Mode of Treatment physical therapy  -AM     Row Name 23 1336          General Information    Patient Profile Reviewed yes  -AM     Prior Level of Function independent:;community mobility;gait;transfer;bed mobility  -AM     Existing Precautions/Restrictions fall  -AM     Barriers to Rehab none identified  -AM     Row Name 23 1336          Living Environment    People in Home facility resident  -AM     Row Name 23 1336          Cognition    Orientation Status (Cognition) oriented x 3  -AM     Row Name 23 1336          Safety  Issues, Functional Mobility    Impairments Affecting Function (Mobility) balance;endurance/activity tolerance;strength  -AM     Comment, Safety Issues/Impairments (Mobility) Gait belt utilized  -AM           User Key  (r) = Recorded By, (t) = Taken By, (c) = Cosigned By    Initials Name Provider Type    AM Sim Frank, PT Physical Therapist               Mobility     Row Name 03/02/23 1337          Bed Mobility    Bed Mobility bed mobility (all) activities;supine-sit  -AM     Supine-Sit Ramsey (Bed Mobility) contact guard;minimum assist (75% patient effort);1 person assist  -AM     Assistive Device (Bed Mobility) bed rails;head of bed elevated  -AM     Row Name 03/02/23 1337          Sit-Stand Transfer    Sit-Stand Ramsey (Transfers) contact guard;minimum assist (75% patient effort);1 person assist  -AM     Assistive Device (Sit-Stand Transfers) walker, front-wheeled  -AM     Comment, (Sit-Stand Transfer) cues for hand placement  -AM     Row Name 03/02/23 1337          Gait/Stairs (Locomotion)    [place] Level (Gait) contact guard;1 person assist  -AM     Assistive Device (Gait) walker, front-wheeled  -AM     Distance in Feet (Gait) 40'  -AM     Deviations/Abnormal Patterns (Gait) base of support, narrow;gait speed decreased  -AM     Bilateral Gait Deviations forward flexed posture  -AM     Comment, (Gait/Stairs) decreased endurance, decreased balance with turns  -AM           User Key  (r) = Recorded By, (t) = Taken By, (c) = Cosigned By    Initials Name Provider Type    AM Sim Frank, PT Physical Therapist               Obj/Interventions     Row Name 03/02/23 1341          Range of Motion Comprehensive    General Range of Motion no range of motion deficits identified  -AM     Row Name 03/02/23 1341          Strength Comprehensive (MMT)    Comment, General Manual Muscle Testing (MMT) Assessment 4/5 throughout  -AM     Row Name 03/02/23 1341          Motor Skills    Motor Skills functional  endurance  -AM     Functional Endurance fair  -AM     Row Name 03/02/23 1341          Balance    Balance Assessment sitting static balance;sitting dynamic balance;sit to stand dynamic balance;standing static balance;standing dynamic balance  -AM     Static Sitting Balance supervision  -AM     Dynamic Sitting Balance supervision  -AM     Position, Sitting Balance unsupported;sitting edge of bed  -AM     Sit to Stand Dynamic Balance contact guard  -AM     Static Standing Balance contact guard;minimal assist  -AM     Dynamic Standing Balance contact guard;minimal assist  -AM     Position/Device Used, Standing Balance walker, front-wheeled  -AM     Row Name 03/02/23 1341          Sensory Assessment (Somatosensory)    Sensory Assessment (Somatosensory) sensation intact  -AM           User Key  (r) = Recorded By, (t) = Taken By, (c) = Cosigned By    Initials Name Provider Type    AM Sim Frank, PT Physical Therapist               Goals/Plan     Row Name 03/02/23 1349          Bed Mobility Goal 1 (PT)    Activity/Assistive Device (Bed Mobility Goal 1, PT) bed mobility activities, all  -AM     [place] Level/Cues Needed (Bed Mobility Goal 1, PT) standby assist  -AM     Time Frame (Bed Mobility Goal 1, PT) long term goal (LTG)  -AM     Row Name 03/02/23 1349          Transfer Goal 1 (PT)    Activity/Assistive Device (Transfer Goal 1, PT) transfers, all  -AM     [place] Level/Cues Needed (Transfer Goal 1, PT) standby assist  -AM     Time Frame (Transfer Goal 1, PT) long term goal (LTG)  -AM     Row Name 03/02/23 1349          Gait Training Goal 1 (PT)    Activity/Assistive Device (Gait Training Goal 1, PT) gait (walking locomotion);walker, rolling  -AM     [place] Level (Gait Training Goal 1, PT) standby assist  -AM     Distance (Gait Training Goal 1, PT) 150'  -AM     Time Frame (Gait Training Goal 1, PT) long term goal (LTG)  -AM     Row Name 03/02/23 1349          Therapy Assessment/Plan (PT)    Planned  "Therapy Interventions (PT) balance training;bed mobility training;gait training;strengthening;patient/family education;transfer training  -AM           User Key  (r) = Recorded By, (t) = Taken By, (c) = Cosigned By    Initials Name Provider Type    AM Sim Frank, PT Physical Therapist               Clinical Impression     Row Name 03/02/23 1341          Pain    Pretreatment Pain Rating 0/10 - no pain  -AM     Posttreatment Pain Rating 0/10 - no pain  -AM     Row Name 03/02/23 1341          Plan of Care Review    Outcome Evaluation Pt is a 76 y/o male that is a [facility] resident with \"seizure-like\" activity.  Blood sugar 30 at [facility].  Chest X-ray:  B extensive multi-focal infiltrates.  Dx:  Hypoglycemia.  Pt reports he ambulated with RW at [facility].  Pt on room air with telemetry this date.  Pt required CGA/Min A for bed mobility, CGA/Min A for transfers with RW and ambulated 40' with RW with CGA.  Pt fixated on \"I want to talk to the Dr because I need to leave for a couple of hours to pay the bills\".  Recommend return back to [facility] with skilled PT.  -AM     Row Name 03/02/23 1341          Therapy Assessment/Plan (PT)    Patient/Family Therapy Goals Statement (PT) \"To leave the hospital for a couple of hours\"  -AM     Rehab Potential (PT) good, to achieve stated therapy goals  -AM     Criteria for Skilled Interventions Met (PT) yes;skilled treatment is necessary  -AM     Therapy Frequency (PT) 3 times/wk  -AM     Predicted Duration of Therapy Intervention (PT) until d/c  -AM     Row Name 03/02/23 1341          Vital Signs    O2 Delivery Pre Treatment room air  -AM     O2 Delivery Intra Treatment room air  -AM     O2 Delivery Post Treatment room air  -AM     Pre Patient Position Supine  -AM     Intra Patient Position Standing  -AM     Post Patient Position Sitting  -AM     Row Name 03/02/23 1341          Positioning and Restraints    Pre-Treatment Position in bed  -AM     Post Treatment Position chair  -AM     In Chair " sitting;call light within reach;encouraged to call for assist;exit alarm on  -AM           User Key  (r) = Recorded By, (t) = Taken By, (c) = Cosigned By    Initials Name Provider Type    Sim Garza PT Physical Therapist               Outcome Measures     Row Name 03/02/23 1350 03/02/23 0820       How much help from another person do you currently need...    Turning from your back to your side while in flat bed without using bedrails? 3  -AM 3  -KU    Moving from lying on back to sitting on the side of a flat bed without bedrails? 3  -AM 3  -KU    Moving to and from a bed to a chair (including a wheelchair)? 3  -AM 3  -KU    Standing up from a chair using your arms (e.g., wheelchair, bedside chair)? 3  -AM 3  -KU    Climbing 3-5 steps with a railing? 3  -AM 2  -KU    To walk in hospital room? 3  -AM 3  -KU    AM-PAC 6 Clicks Score (PT) 18  -AM 17  -KU    Highest level of mobility 6 --> Walked 10 steps or more  -AM 5 --> Static standing  -KU    Row Name 03/02/23 1350          Functional Assessment    Outcome Measure Options AM-PAC 6 Clicks Basic Mobility (PT)  -AM           User Key  (r) = Recorded By, (t) = Taken By, (c) = Cosigned By    Initials Name Provider Type    Kim Tavares RN Registered Nurse    Sim Garza, PT Physical Therapist                             Physical Therapy Education     Title: PT OT SLP Therapies (Done)     Topic: Physical Therapy (Done)     Point: Mobility training (Done)     Learning Progress Summary           Patient Acceptance, E,TB, VU by AM at 3/2/2023 1350                   Point: Home exercise program (Done)     Learning Progress Summary           Patient Acceptance, E,TB, VU by AM at 3/2/2023 1350                   Point: Body mechanics (Done)     Learning Progress Summary           Patient Acceptance, E,TB, VU by AM at 3/2/2023 1350                   Point: Precautions (Done)     Learning Progress Summary           Patient Acceptance, E,TB, VU by AM at  "3/2/2023 1350                               User Key     Initials Effective Dates Name Provider Type Discipline    AM 05/10/21 -  Sim Frank, PT Physical Therapist PT              PT Recommendation and Plan  Planned Therapy Interventions (PT): balance training, bed mobility training, gait training, strengthening, patient/family education, transfer training  Outcome Evaluation: Pt is a 78 y/o male that is a [facility] resident with \"seizure-like\" activity.  Blood sugar 30 at [facility].  Chest X-ray:  B extensive multi-focal infiltrates.  Dx:  Hypoglycemia.  Pt reports he ambulated with RW at [facility].  Pt on room air with telemetry this date.  Pt required CGA/Min A for bed mobility, CGA/Min A for transfers with RW and ambulated 40' with RW with CGA.  Pt fixated on \"I want to talk to the Dr because I need to leave for a couple of hours to pay the bills\".  Recommend return back to [facility] with skilled PT.     Time Calculation:    PT Charges     Row Name 03/02/23 1351             Time Calculation    Start Time 0937  -AM      Stop Time 1000  -AM      Time Calculation (min) 23 min  -AM      PT Received On 03/02/23  -AM      PT - Next Appointment 03/03/23  -AM      PT Goal Re-Cert Due Date 03/16/23  -AM            User Key  (r) = Recorded By, (t) = Taken By, (c) = Cosigned By    Initials Name Provider Type    AM Sim Frank PT Physical Therapist              Therapy Charges for Today     Code Description Service Date Service Provider Modifiers Qty    32980127404 HC PT EVAL MOD COMPLEXITY 3 3/2/2023 Sim Frank, PT GP 1          PT G-Codes  Outcome Measure Options: AM-PAC 6 Clicks Basic Mobility (PT)  AM-PAC 6 Clicks Score (PT): 18  PT Discharge Summary  Anticipated Discharge Disposition (PT): skilled nursing facility    Sim Frank PT  3/2/2023    "

## 2023-03-02 NOTE — PLAN OF CARE
Goal Outcome Evaluation:              Outcome Evaluation: Pt. resting through comfortable during shift. Placed F/C due to retention. will continue to monitor and plan is to D/C to [facility]

## 2023-03-02 NOTE — CASE MANAGEMENT/SOCIAL WORK
Continued Stay Note  [facility]     Patient Name: Holger Ibrahim  MRN: [id number]  Today's Date: 3/2/2023    Admit Date: 2/23/2023    Plan: Return to [facility]. No precert required. Watch for new HD needs.   Discharge Plan     Row Name 03/02/23 1554       Plan    Plan Return to [facility]. No precert required. Watch for new HD needs.    Patient/Family in Agreement with Plan yes    Plan Comments DC barriers: Renal and Urology following. Pt continues with IV Bumex and watching for new hemodialysis needs.              Phone communication or documentation only - no physical contact with patient or family.      Megan Naegele, RN      Office Phone: [phone]  Office Cell: [phone]

## 2023-03-02 NOTE — CASE MANAGEMENT/SOCIAL WORK
Social Work Assessment  [facility]     Patient Name: Holger Ibrahim  MRN: [id number]  Today's Date: 3/2/2023    Admit Date: 2/23/2023       Functional Status     Row Name 03/02/23 1408       Mental Status Summary    Recent Changes in Mental Status/Cognitive Functioning decision-making/judgment    Mental Status Comments LSW received secure chat that patient's daughter-in-law wanted a call. LSW called patient's daughter-in-law and discussed wanting to complete POA paperwork. Discussed POA vs. ACP vs. Living Will. Manasa stated patient has already completed a living will but she does not have POA. Explained LSW can only complete Advance Directive's, however Manasa would like to complete POA and will do so when patient is d/c'ed from [facility]. Also informed her she could hire a  and have them meet the patient and family in his room. However, she stated she did not have the money to hire a  to complete POA paperwork. Suggested finding a local [facility] to complete the paperwork.                 Phone communication - no physical contact with patient or family.       INGA Diaz, KENROYW    Phone: [phone]  Cell: [phone]  Fax: [phone]  [email or web address]

## 2023-03-02 NOTE — PROGRESS NOTES
[facility] Medicine Services Daily Progress Note    Patient Name: Holger Ibrahim  : 1945  MRN: [id number]  Primary Care Physician:  Rachel Mckeon MD  Date of admission: 2023      Subjective      Chief Complaint: Hypoglycemia from [facility]        Patient Reports he is doing all right.  No complaints.  Denies any pain.  Glucose has been stable.  Renal function slightly worse.  Urology following for urinary retention     ROS negative except as above    Objective      Vitals:   Temp:  [97.1 °F (36.2 °C)-98.4 °F (36.9 °C)] 98.4 °F (36.9 °C)  Heart Rate:  [70-79] 70  Resp:  [10-14] 12  BP: (105-146)/(63-88) 135/73    Vitals reviewed.   Constitutional:       Comments: Multiple pressure injury wounds including feet. Diaz replaced  HENT:      Head: Normocephalic.   Cardiovascular:      Rate and Rhythm: Normal rate.   Pulmonary:      Effort: Pulmonary effort is normal.   Abdominal:      General: Abdomen is flat.      Palpations: Abdomen is soft.   Musculoskeletal:         General: Normal range of motion.      Cervical back: Normal range of motion.   Skin:     General: Skin is warm.   Neurological:      General: No focal deficit present.      Mental Status: He is alert and oriented to person, place, and time.   Psychiatric:         Mood and Affect: Mood normal.         Behavior: Behavior normal.        Result Review    Result Review:  I have personally reviewed the results from the time of this admission to 3/2/2023 15:35 EST and agree with these findings:  [x]  Laboratory  []  Microbiology  []  Radiology  []  EKG/Telemetry   []  Cardiology/Vascular   []  Pathology  []  Old records  []  Other:  Most notable findings include: Creatinine 4.18    Wounds (last 24 hours)     LDA Wound     Row Name 23 0820 23          Wound 23 1437 medial coccyx Pressure Injury    Wound - Properties Group Placement Date: 23  - Placement Time: 143  - Present on Hospital  Admission: Y  -SH Orientation: medial  -SH Location: coccyx  -SH Primary Wound Type: Pressure inj  -SH Additional Comments: stage 2  -SH    Pressure Injury Stage 2  -KU 2  -LH     Dressing Appearance -- dry;intact  -LH     Closure VENITA;Adhesive bandage  -KU VENITA;Adhesive bandage  -LH     Base pink;white  -KU --     Periwound Temperature warm  -KU --     Periwound Skin Turgor soft  -KU --     Drainage Amount none  -KU --     Dressing Care dressing applied;silicone  meplilex  -KU --     Retired Wound - Properties Group Placement Date: 02/23/23 -SH Placement Time: 1437 -SH Present on Hospital Admission: Y  -SH Orientation: medial  -SH Location: coccyx  -SH Primary Wound Type: Pressure inj  -SH Additional Comments: stage 2  -SH    Retired Wound - Properties Group Date first assessed: 02/23/23 -SH Time first assessed: 1437 -SH Present on Hospital Admission: Y  -SH Location: coccyx  -SH Primary Wound Type: Pressure inj  -SH Additional Comments: stage 2  -SH       Wound 02/23/23 1712 Right posterior heel Pressure Injury    Wound - Properties Group Placement Date: 02/23/23 -BS Placement Time: 1712 -BS Present on Hospital Admission: Y  -BS Side: Right  -BS Orientation: posterior  -BS Location: heel  -BS Primary Wound Type: Pressure inj  -BS    Pressure Injury Stage DTPI  -KU DTPI  -LH     Dressing Appearance -- open to air  -LH     Closure Open to air  -KU Open to air  -LH     Base maroon/purple  -KU maroon/purple  -LH     Periwound blanchable  -KU blanchable  -LH     Drainage Amount none  -KU none  -LH     Dressing Care open to air  -KU open to air  -LH     Retired Wound - Properties Group Placement Date: 02/23/23 -BS Placement Time: 1712 -BS Present on Hospital Admission: Y  -BS Side: Right  -BS Orientation: posterior  -BS Location: heel  -BS Primary Wound Type: Pressure inj  -BS    Retired Wound - Properties Group Date first assessed: 02/23/23 -BS Time first assessed: 1712 -BS Present on Hospital Admission: Y  -BS  Side: Right  -BS Location: heel  -BS Primary Wound Type: Pressure inj  -BS       Wound 02/23/23 1715 Left posterior heel    Wound - Properties Group Placement Date: 02/23/23 -BS Placement Time: 1715 -BS Side: Left  -BS Orientation: posterior  -BS Location: heel  -BS    Pressure Injury Stage O  cracked heel  -KU O  cracked heel  -LH     Dressing Appearance open to air  -KU open to air  -LH     Closure Open to air  -KU Open to air  -LH     Base dry;pink  -KU dry;pink  -LH     Edges callused  -KU callused  -LH     Drainage Amount -- none  -LH     Dressing Care -- open to air  -LH     Retired Wound - Properties Group Placement Date: 02/23/23 -BS Placement Time: 1715 -BS Side: Left  -BS Orientation: posterior  -BS Location: heel  -BS    Retired Wound - Properties Group Date first assessed: 02/23/23 -BS Time first assessed: 1715 -BS Side: Left  -BS Location: heel  -BS          User Key  (r) = Recorded By, (t) = Taken By, (c) = Cosigned By    Initials Name Provider Type    Kim Tavares, RN Registered Nurse     Yudi Hodges RN Registered Nurse    Rain Gudino, RN Registered Nurse    Ledy Saul LPN Licensed Nurse                  Assessment & Plan       Brief Patient Summary:  Holger Ibrahim is a 77 y.o. male who presents with hypoglycemia from [facility]              amiodarone, 200 mg, Oral, TID  apixaban, 5 mg, Oral, Daily  atorvastatin, 40 mg, Oral, Nightly  clopidogrel, 75 mg, Oral, Daily  pantoprazole, 40 mg, Oral, Daily  sodium chloride, 10 mL, Intravenous, Q12H  tamsulosin, 0.4 mg, Oral, Daily               Active Hospital Problems:          Active Hospital Problems     Diagnosis     • **Hypoglycemia     • Unstageable pressure ulcer of sacral region (HCC)     • Unstageable pressure ulcer of right heel (HCC)     • Unstageable pressure ulcer of left heel (HCC)        Plan:   Acute hypoxia on 2L  CXR ordered 2/25  Respiratory panel negative     +stool for blood  Monitor Hb   Stable 7-8  range  May need a transfusion.  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  - Nephrology consulted  - Avoid nephrotoxic medications/dyes if at all possible  -Hyperkalemia on February 24.  Lokelma ordered. K+ normal  - sodium finally 130+ on 3/1  --Bun/cr still not plateued     Iron deficiency anemia  - Hemoglobin 8.3, appears to be chronic  - Occult stool  - Monitor labs     HTN  HLD  - /71  - Lipid panel ordered  - Continue statin and Norvasc     Diastolic dysfunction  Nonischemic cardiomyopathy  Aortic stenosis  Pitting edema  - Sees Dr. Rasheed outpatient  - Recently at [facility] and underwent a cardiac cath, was deemed too frail to undergo a TAVR at that time, especially with progressive renal insufficiency, follows heart failure clinic and they are still proceeding with work-up for possible TAVR in the near future.  - According to care everywhere records last 2D echo 2/1/23 showed EF 33% with moderate to severe aortic stenosis  - Most recently admitted to [facility] in January due to anasarca  - Continue home diuretics, may follow nephrology recommendations  - EKG ordered and shows normal sinus rhythm  - Check proBNP  - Palliative team consulted     Urinary retention  - Has chronic Diaz catheter to bedside drainage, present on admission  - According to care everywhere records was followed by urology at recent admission to [facility]  - Continue home Flomax  - Check a U/A  -urology consulted for phimosis 2/27. Pending cysto     Proximal A-fib  - EKG showed NSR  - Continue home  amiodarone and Eliquis     DVT prophylaxis  -Continue home Eliquis     COPD  Pulmonary nodule: 13mm RLL with an internal solid 3mm nodule, and a 6mm RLL  Recent COVID infection requiring hospitalization at [facility] January 2023  Current smoker  - Patient had a hospital follow up with pulmonologist, Dr. Cobian 1/26/2023 and was sent to ER for admission due to anasarca  - Pulmonary note reviewed in care everywhere  - Currently on room air and denies shortness of breath  - Smoking cessation education     Right leg claudication s/p popliteal artery angioplasty 2019     D10 drip stopped monitor glucose        Return to [facility] once sodium stable and nephro/urology clears      CODE STATUS: DNR  Lengthy discussion had with patient on CODE STATUS.  He is alert oriented x3.     Admission Status:  I believe this patient meets inpatient status.     I discussed the patient's findings and my recommendations with patient.           03/02/23      Electronically signed by Segun Rodrigues MD, 03/02/23, 15:35 EST.  [facility] Hospitalist Team

## 2023-03-03 NOTE — PLAN OF CARE
Problem: Adult Inpatient Plan of Care  Goal: Plan of Care Review  Outcome: Ongoing, Progressing  Flowsheets (Taken 3/3/2023 1320)  Outcome Evaluation: Patient in bed watching TV.  Denies any pain at this time. Applied 2Lo2 for comfort measures.  Hgb 9.3 nephrology wants to keep another night  Goal: Absence of Hospital-Acquired Illness or Injury  Intervention: Identify and Manage Fall Risk  Recent Flowsheet Documentation  Taken 3/3/2023 1200 by Jim Martinez RN  Safety Promotion/Fall Prevention: safety round/check completed  Taken 3/3/2023 1000 by Jim Martinez RN  Safety Promotion/Fall Prevention: safety round/check completed  Taken 3/3/2023 0815 by Jim Martinez RN  Safety Promotion/Fall Prevention: safety round/check completed  Intervention: Prevent Skin Injury  Recent Flowsheet Documentation  Taken 3/3/2023 0815 by Jim Martinez RN  Skin Protection: adhesive use limited  Goal: Optimal Comfort and Wellbeing  Intervention: Provide Person-Centered Care  Recent Flowsheet Documentation  Taken 3/3/2023 0815 by Jim Martinez RN  Trust Relationship/Rapport: care explained   Goal Outcome Evaluation:              Outcome Evaluation: Patient in bed watching TV.  Denies any pain at this time. Applied 2Lo2 for comfort measures.  Hgb 9.3 nephrology wants to keep another night

## 2023-03-03 NOTE — PROGRESS NOTES
Nutrition Services    Patient Name: Holger Ibrahim  YOB: 1945  MRN: [id number]  Admission date: 2/23/2023    Change ONS regimen to:   Novasource Renal q daily provides (475 kcals, 22 g protein if consumed)   Boost Breeze q daily (Provides 250 kcals, 9 g protein if consumed)     Please note, Boost Breeze is low in potassium and phosphorus.  Free fluid per serving is less than total volume - see below:  Comparison Chart        Potassium per serving  Phosphorus per serving Free fluid per serving   Novasource Renal  230mg 200mg 168mL   Boost Breeze  0mg 170mg 194mL       PPE Documentation        PPE Worn By Provider mask and eye protection   PPE Worn By Patient  N/A     CLINICAL NUTRITION ASSESSMENT      Reason for Assessment Follow up protocol   2/25: wounds     H&P      Past Medical History:   Diagnosis Date   • Alcoholism (HCC)     hx of   • DM2 (diabetes mellitus, type 2) ([facility])    • Hyperlipidemia    • Hypertension    • PVD (peripheral vascular disease) ([facility])     PTA 2017       Past Surgical History:   Procedure Laterality Date   • APPENDECTOMY     • CARDIAC CATHETERIZATION N/A 11/5/2019    Procedure: PERIPHERAL ANGIOGRAPHY, Rt popliteal PTA;  Surgeon: Jonny Ferguson MD;  Location: [facility] CATH INVASIVE LOCATION;  Service: Cardiovascular   • EYE SURGERY Right    • HERNIA REPAIR     • LEG SURGERY  01/2020   • POPLITEAL ARTERY ANGIOPLASTY Right 2017   • SPLENECTOMY      partial spleen removal        Current Problems   Acute hypoxia   - on supplemental O2    Concerns for blood in stool   - may require transfusion    Hypoglycemia in the setting of DM-2  -endocrinology following    SHAHRAM on CKD IV  -nephrology following    Diastolic dysfunction  Nonischemic cardiomyopathy  Aortic stenosis  Pitting edema  -[facility]  --chronic rader catheter in place on admission    COPD     Encounter Information        Trending Narrative     3/3: Pt  "assessed for follow up. Pt has been eating well and tolerating PO. Intake % at all recent meals. Pt continues on dialysis. There is concern for low hemoglobin and possible need for transfusion; will continue to monitor GI status and ability to take PO. At this point, diet continues and pt tolerating.     2/25: Pt admitted to [facility] from [facility] with hypoglycemia and \"seizure-like activity.\" Pt hyperkalemic on 2/24, lokelma used. Would expect diarrhea. WOCN assessedpt on 2/24 and noted unstageable wounds. Pt reports he is eating okay and he generally eats 2 meals daily. Pt states he has never tried a Boost shake but he is willing to. RD provided pt with ONS at time of visit and pt trialed the shake - pt agreeable to RD ordering chocolate flavor and pt aware that shakes will not come with his meal trays. Pt denies food allergies and difficulty chewing/swallowing. NFPE completed, consistent with nutrition diagnosis of malnutrition using AND/ASPEN criteria. See MSA below.        Anthropometrics        Current Height, Weight Height: 177.8 cm (70\")  Weight: 73.1 kg (161 lb 2.5 oz) (03/02/23 0351)       Ideal Body Weight (IBW) 166#   Usual Body Weight (UBW) Pt unsure       Trending Weight Hx     This admission: 3/3: 6% gain since prior assessment (likely R/t fluid gain, noted volume overload per nephrology note; receiving diuresis)   2/24: 153# - scale             PTA: No recent wt hx to review    Wt Readings from Last 30 Encounters:   03/02/23 0351 73.1 kg (161 lb 2.5 oz)   03/01/23 0358 73.1 kg (161 lb 2.5 oz)   02/28/23 0348 72 kg (158 lb 11.7 oz)   02/26/23 0357 71.7 kg (158 lb 1.1 oz)   02/25/23 0413 69.5 kg (153 lb 3.5 oz)   02/24/23 0356 68.9 kg (151 lb 14.4 oz)   02/23/23 2001 68.9 kg (151 lb 14.4 oz)   02/23/23 0445 70 kg (154 lb 6.4 oz)   03/03/20 1313 62.1 kg (137 lb)   11/05/19 1140 60.2 kg (132 lb 11.5 oz)   08/29/19 1452 59.4 kg (131 lb)   07/30/19 0945 60.2 kg (132 lb 11.5 oz)   07/09/19 1308 61.7 kg (136 lb) "   07/08/19 1307 65.8 kg (145 lb)   06/11/19 1341 64.7 kg (142 lb 9.6 oz)      BMI kg/m2 Body mass index is 23.12 kg/m².       Labs        Pertinent Labs    Results from last 7 days   Lab Units 03/03/23  1015 03/02/23  0438 03/01/23  1023   SODIUM mmol/L 130* 130* 130*   POTASSIUM mmol/L 4.4 4.5 4.7   CHLORIDE mmol/L 93* 94* 95*   CO2 mmol/L 22.0 23.0 23.0   BUN mg/dL 79* 71* 70*   CREATININE mg/dL 4.05* 4.18* 3.74*   CALCIUM mg/dL 8.2* 8.1* 8.3*   BILIRUBIN mg/dL 0.3 <0.2 <0.2   ALK PHOS U/L 112 107 114   ALT (SGPT) U/L 19 18 18   AST (SGOT) U/L 25 23 22   GLUCOSE mg/dL 172* 211* 180*     Results from last 7 days   Lab Units 03/03/23  1015 03/02/23  0438 03/01/23  1023   MAGNESIUM mg/dL 1.6 1.7 1.8   PHOSPHORUS mg/dL 6.0* 5.7* 5.6*   HEMOGLOBIN g/dL 9.3* 7.3* 8.0*   HEMATOCRIT % 28.9* 22.6* 25.3*     COVID19   Date Value Ref Range Status   02/25/2023 Not Detected Not Detected - Ref. Range Final     Lab Results   Component Value Date    HGBA1C 7.4 (H) 02/23/2023        Medications    Scheduled Medications amiodarone, 200 mg, Oral, TID  apixaban, 5 mg, Oral, Daily  atorvastatin, 40 mg, Oral, Nightly  [START ON 3/4/2023] bumetanide, 1 mg, Intravenous, Q12H  clopidogrel, 75 mg, Oral, Daily  insulin lispro, 2-7 Units, Subcutaneous, TID With Meals  pantoprazole, 40 mg, Oral, Daily  sodium chloride, 10 mL, Intravenous, Q12H  tamsulosin, 0.4 mg, Oral, Daily        Infusions      PRN Medications •  acetaminophen **OR** acetaminophen **OR** acetaminophen  •  dextrose  •  dextrose  •  dextrose  •  glucagon (human recombinant)  •  melatonin  •  nitroglycerin  •  ondansetron  •  [COMPLETED] Insert Peripheral IV **AND** sodium chloride  •  sodium chloride  •  sodium chloride     Physical Findings        Trending Physical   Appearance, NFPE 3/3: Agree with prior nutrition Dx on follow up NFPE    2/25: NFPE completed, consistent with nutrition diagnosis of malnutrition using AND/ASPEN criteria. See MSA below.      --  Edema  2+  face, arms, scrotum  1+ legs, ankles, feet      Bowel Function Last documented BM on 3/2     Tubes No feeding tube     Chewing/Swallowing No reported difficulty     Skin unstageable L heel and unstageable pressure injury sacrum - see wound care note     --  Current Nutrition Orders & Evaluation of Intake       Oral Nutrition     Food Allergies NKFA   Current PO Diet Diet: Diabetic Diets, Renal Diets; Consistent Carbohydrate; Low Phosphorus; Texture: Regular Texture (IDDSI 7); Fluid Consistency: Thin (IDDSI 0)   Supplement Novasource Renal BID (Provides 950 kcals, 43 g protein if consumed)      PO Evaluation     Trending % PO Intake 3/2: 100% intake at recent meals   2/25: pt eating 50-75% of meals   --  Nutritional Risk Screening        NRS-2002 Score          Nutrition Diagnosis         Nutrition Dx Problem 1 Severe chronic disease related malnutrition related to inadequate energy intake in the setting of multiple chronic diseases as evidenced by PO intake meeting less than 75% of estimated energy requirement for greater than or equal to 3 months, severe muscle wasting, severe fat wasting, and moderate fluid accumulation.      Nutrition Dx Problem 2        Intervention Goal         Intervention Goal(s) PO intake > 75%, ONS acceptance     Nutrition Intervention        RD Action Modification of ONS     Nutrition Prescription          Diet Prescription Consistent Carbohydrate, Low Phosphorus   Supplement Prescription Change ONS regimen to:   Novasource Renal q daily provides (475 kcals, 22 g protein if consumed)   Boost Breeze q daily (Provides 250 kcals, 9 g protein if consumed)      --  Monitor/Evaluation        Monitor Per protocol, PO intake, Supplement intake, Pertinent labs, Weight, Skin status, GI status, Symptoms, POC/GOC     Malnutrition Severity Assessment      Patient meets criteria for : Severe Malnutrition         Electronically signed by:  Cass Addison RD  03/03/23 15:41 EST

## 2023-03-03 NOTE — PROGRESS NOTES
[facility] Progress Note      Patient Name: Holger Ibrahim  : 1945  MRN: [id number]  Primary Care Physician:  Rachel Mckeon MD  Date of admission: 2023    Subjective     Interval History:     Patient resting comfortably.  Has no new complaints today  Urine output noted at 1400 cc last 24 hours.  No labs today.  Creatinine is up    Objective     Vitals:   Temp:  [96.7 °F (35.9 °C)-98 °F (36.7 °C)] 96.7 °F (35.9 °C)  Heart Rate:  [69-74] 71  Resp:  [10-15] 12  BP: (105-135)/(68-98) 105/75    Intake/Output Summary (Last 24 hours) at 3/3/2023 1036  Last data filed at 3/3/2023 0850  Gross per 24 hour   Intake 1850 ml   Output 1400 ml   Net 450 ml       Physical Exam:    General Appearance: NAD  HEENT: oral mucosa normal, nonicteric sclera  Neck: supple, no JVD  Lungs: CTA  Heart: RRR, normal S1 and S2  Abdomen: soft, nondistended  Extremities: Trace to 1+ edema bilateral lower extremities  Neuro: Awake alert and moving all extremities    Scheduled Meds:     amiodarone, 200 mg, Oral, TID  apixaban, 5 mg, Oral, Daily  atorvastatin, 40 mg, Oral, Nightly  bumetanide, 2 mg, Intravenous, Q12H  clopidogrel, 75 mg, Oral, Daily  insulin lispro, 2-7 Units, Subcutaneous, TID With Meals  pantoprazole, 40 mg, Oral, Daily  sodium chloride, 10 mL, Intravenous, Q12H  tamsulosin, 0.4 mg, Oral, Daily      IV Meds:        Results Reviewed:   I have personally reviewed the results from the time of this admission to 3/3/2023 10:36 EST     Results from last 7 days   Lab Units 23  0438 23  1023 23  1601   SODIUM mmol/L 130* 130* 130*   POTASSIUM mmol/L 4.5 4.7 5.3*   CHLORIDE mmol/L 94* 95* 94*   CO2 mmol/L 23.0 23.0 24.0   BUN mg/dL 71* 70* 68*   CREATININE mg/dL 4.18* 3.74* 3.65*   CALCIUM mg/dL 8.1* 8.3* 8.3*   BILIRUBIN mg/dL <0.2 <0.2 <0.2   ALK PHOS U/L 107 114 118*   ALT (SGPT) U/L 18 18 20   AST (SGOT) U/L 23 22 23   GLUCOSE mg/dL 211* 180* 108*     Estimated Creatinine  Clearance: 15.8 mL/min (A) (by C-G formula based on SCr of 4.18 mg/dL (H)).  Results from last 7 days   Lab Units 03/02/23  0438 03/01/23  1023 02/28/23  1601   MAGNESIUM mg/dL 1.7 1.8 2.0   PHOSPHORUS mg/dL 5.7* 5.6* 5.4*         Results from last 7 days   Lab Units 03/02/23  0438 03/01/23  1023 02/28/23  1601 02/27/23  1035 02/26/23  1132   WBC 10*3/mm3 3.20* 3.30* 3.60 3.60 5.10   HEMOGLOBIN g/dL 7.3* 8.0* 8.7* 7.8* 8.3*   PLATELETS 10*3/mm3 323 338 336 296 316           Assessment / Plan     ASSESSMENT:  1. Acute kidney injury initially thought to be secondary to cardiorenal syndrome.  Creatinine increasing again with diuresis but volume status generous.  Electrolytes okay  2. Chronic kidney disease stage III creatinine baseline around 1.6-1.8 mg/dL  3. Obstructive uropathy.  Improving.  Diaz catheter removed.  Urology following  4. Volume overload.  Improving with diuresis  5. Hyponatremia likely due to hypovolemia.  Stable  6. History of hypertension with CKD.  Blood pressure running low at this time.  Off antihypertensives  7. History of diabetes mellitus with CKD  8. History of aortic stenosis  9.  Anemia.  Hemoglobin 7.3 this morning     PLAN:  Creatinine is up with diuresis.  We will decrease Bumex to 1 mg twice daily  Continue to monitor creatinine try transplant  Patient is heading toward dialysis.  Fortunately there is no acute indication for dialysis today.    Lucia Lua MD  03/03/23  10:36 [facility]    [facility]  [phone]

## 2023-03-03 NOTE — CASE MANAGEMENT/SOCIAL WORK
Continued Stay Note  ROGER Kvng     Patient Name: Holger Ibrahim  MRN: [id number]  Today's Date: 3/3/2023    Admit Date: 2/23/2023    Plan: Return to [facility]. No precert required. Watch for new HD needs.   Discharge Plan     Row Name 03/03/23 1623       Plan    Plan Return to [facility]. No precert required. Watch for new HD needs.    Plan Comments Nephrology continues to monitor. Pt to keep rader cath in per urology. Possible dc tomorrow 3/4.              Phone communication or documentation only - no physical contact with patient or family.      Megan Naegele, RN      Office Phone: [phone]  Office Cell: [phone]

## 2023-03-03 NOTE — PROGRESS NOTES
[facility] Medicine Services Daily Progress Note    Patient Name: Holger Ibrahim  : 1945  MRN: [id number]  Primary Care Physician:  Rachel Mckeon MD  Date of admission: 2023    Subjective      Chief Complaint: Hypoglycemia from [facility]     Patient Reports he is doing all right.  No complaints.  Renal function finally stabilizing.  Monitor 1 more day.  Glucose running high.  Small dose Lantus started given recurrent hypoglycemia.  Urology cleared for discharge.     ROS negative except as above    Objective      Vitals:   Temp:  [96.7 °F (35.9 °C)-97.8 °F (36.6 °C)] 97.3 °F (36.3 °C)  Heart Rate:  [68-74] 72  Resp:  [10-15] 13  BP: (103-127)/(59-81) 115/65  Flow (L/min):  [2] 2  Vitals reviewed.   Constitutional:       Comments: Multiple pressure injury wounds including feet. Diaz replaced  HENT:      Head: Normocephalic.   Cardiovascular:      Rate and Rhythm: Normal rate.   Pulmonary:      Effort: Pulmonary effort is normal.   Abdominal:      General: Abdomen is flat.      Palpations: Abdomen is soft.   Musculoskeletal:         General: Normal range of motion.      Cervical back: Normal range of motion.   Skin:     General: Skin is warm.   Neurological:      General: No focal deficit present.      Mental Status: He is alert and oriented to person, place, and time.   Psychiatric:         Mood and Affect: Mood normal.         Behavior: Behavior normal.        Result Review    Result Review:  I have personally reviewed the results from the time of this admission to 3/3/2023 16:57 EST and agree with these findings:  [x]  Laboratory  []  Microbiology  []  Radiology  []  EKG/Telemetry   []  Cardiology/Vascular   []  Pathology  []  Old records  []  Other:  Most notable findings include: Creatinine 4.05 today hemoglobin 9.3 after transfusion yesterday    Wounds (last 24 hours)     LDA Wound     Row Name 23 1620 23 1200 23 0815       Wound 23 1437 [facility]  coccyx Pressure Injury    Wound - Properties Group Placement Date: 02/23/23  -SH Placement Time: 1437  -SH Present on Hospital Admission: Y  -SH Orientation: medial  -SH Location: coccyx  -SH Primary Wound Type: Pressure inj  -SH Additional Comments: stage 2  -SH    Closure VENITA  -TL VENITA  -TL VENITA  -TL    Periwound Temperature warm  -TL warm  -TL warm  -TL    Retired Wound - Properties Group Placement Date: 02/23/23  -SH Placement Time: 1437  -SH Present on Hospital Admission: Y  -SH Orientation: medial  -SH Location: coccyx  -SH Primary Wound Type: Pressure inj  -SH Additional Comments: stage 2  -SH    Retired Wound - Properties Group Date first assessed: 02/23/23  -SH Time first assessed: 1437  -SH Present on Hospital Admission: Y  -SH Location: coccyx  -SH Primary Wound Type: Pressure inj  -SH Additional Comments: stage 2  -SH       Wound 02/23/23 1712 Right posterior heel Pressure Injury    Wound - Properties Group Placement Date: 02/23/23  -BS Placement Time: 1712  -BS Present on Hospital Admission: Y  -BS Side: Right  -BS Orientation: posterior  -BS Location: heel  -BS Primary Wound Type: Pressure inj  -BS    Closure Open to air  -TL Open to air  -TL Open to air  -TL    Base maroon/purple  -TL maroon/purple  -TL maroon/purple  -TL    Periwound blanchable  -TL blanchable  -TL blanchable  -TL    Drainage Amount none  -TL none  -TL none  -TL    Retired Wound - Properties Group Placement Date: 02/23/23  -BS Placement Time: 1712 -BS Present on Hospital Admission: Y  -BS Side: Right  -BS Orientation: posterior  -BS Location: heel  -BS Primary Wound Type: Pressure inj  -BS    Retired Wound - Properties Group Date first assessed: 02/23/23  -BS Time first assessed: 1712  -BS Present on Hospital Admission: Y  -BS Side: Right  -BS Location: heel  -BS Primary Wound Type: Pressure inj  -BS       Wound 02/23/23 1715 Left posterior heel    Wound - Properties Group Placement Date: 02/23/23  -BS Placement Time: 1715  -BS Side:  Left  -BS Orientation: posterior  -BS Location: heel  -BS    Closure Open to air  -TL Open to air  -TL Open to air  -TL    Base dry;pink  -TL dry;pink  -TL dry;pink  -TL    Edges callused  -TL callused  -TL callused  -TL    Retired Wound - Properties Group Placement Date: 02/23/23 -BS Placement Time: 1715 -BS Side: Left  -BS Orientation: posterior  -BS Location: heel  -BS    Retired Wound - Properties Group Date first assessed: 02/23/23 -BS Time first assessed: 1715 -BS Side: Left  -BS Location: heel  -BS    Row Name 03/02/23 2049             Wound 02/23/23 1437 medial coccyx Pressure Injury    Wound - Properties Group Placement Date: 02/23/23 -SH Placement Time: 1437 -SH Present on Hospital Admission: Y  -SH Orientation: medial  -SH Location: coccyx  -SH Primary Wound Type: Pressure inj  -SH Additional Comments: stage 2  -SH    Dressing Appearance dry;intact  -KF      Closure VENITA  -KF      Base white  -KF      Periwound Temperature warm  -KF      Retired Wound - Properties Group Placement Date: 02/23/23 -SH Placement Time: 1437  -SH Present on Hospital Admission: Y  -SH Orientation: medial  -SH Location: coccyx  -SH Primary Wound Type: Pressure inj  -SH Additional Comments: stage 2  -SH    Retired Wound - Properties Group Date first assessed: 02/23/23 -SH Time first assessed: 1437 -SH Present on Hospital Admission: Y  -SH Location: coccyx  -SH Primary Wound Type: Pressure inj  -SH Additional Comments: stage 2  -SH       Wound 02/23/23 1712 Right posterior heel Pressure Injury    Wound - Properties Group Placement Date: 02/23/23 -BS Placement Time: 1712 -BS Present on Hospital Admission: Y  -BS Side: Right  -BS Orientation: posterior  -BS Location: heel  -BS Primary Wound Type: Pressure inj  -BS    Closure Open to air  -KF      Base maroon/purple  -KF      Periwound blanchable  -KF      Drainage Amount none  -KF      Dressing Care open to air  -KF      Retired Wound - Properties Group Placement Date:  02/23/23 -BS Placement Time: 1712 -BS Present on Hospital Admission: Y  -BS Side: Right  -BS Orientation: posterior  -BS Location: heel  -BS Primary Wound Type: Pressure inj  -BS    Retired Wound - Properties Group Date first assessed: 02/23/23 -BS Time first assessed: 1712 -BS Present on Hospital Admission: Y  -BS Side: Right  -BS Location: heel  -BS Primary Wound Type: Pressure inj  -BS       Wound 02/23/23 1715 Left posterior heel    Wound - Properties Group Placement Date: 02/23/23 -BS Placement Time: 1715 -BS Side: Left  -BS Orientation: posterior  -BS Location: heel  -BS    Dressing Appearance open to air  -KF      Closure Open to air  -KF      Base dry;pink  -KF      Edges callused  -KF      Retired Wound - Properties Group Placement Date: 02/23/23 -BS Placement Time: 1715 -BS Side: Left  -BS Orientation: posterior  -BS Location: heel  -BS    Retired Wound - Properties Group Date first assessed: 02/23/23 -BS Time first assessed: 1715 -BS Side: Left  -BS Location: heel  -BS          User Key  (r) = Recorded By, (t) = Taken By, (c) = Cosigned By    Initials Name Provider Type    Leighann Pedroza LPN Licensed Nurse    Yudi Mireles RN Registered Nurse    Jim Barlow RN Registered Nurse    Ledy Saul LPN Licensed Nurse                    Assessment & Plan       Brief Patient Summary:  Holger Ibrahim is a 77 y.o. male who presents with hypoglycemia from [facility]        amiodarone, 200 mg, Oral, TID  apixaban, 5 mg, Oral, Daily  atorvastatin, 40 mg, Oral, Nightly  clopidogrel, 75 mg, Oral, Daily  pantoprazole, 40 mg, Oral, Daily  sodium chloride, 10 mL, Intravenous, Q12H  tamsulosin, 0.4 mg, Oral, Daily        Active Hospital Problems:          Active Hospital Problems     Diagnosis     • **Hypoglycemia     • Unstageable pressure ulcer of sacral region (HCC)     • Unstageable pressure ulcer of right heel (HCC)     • Unstageable pressure ulcer of left heel (HCC)         Plan:   Acute on chronic anemia  Patient transfused on March 2.  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  Lokelma ordered. K+ normal  - sodium finally 130+ on 3/1  --Bun/cr finally stabilized March 3.  Monitor 1 more day     Iron deficiency anemia  - Hemoglobin 8.3, appears to be chronic  - Occult stool  - Monitor labs     HTN  HLD  - /71  - Lipid panel ordered  - Continue statin and Norvasc     Diastolic dysfunction  Nonischemic cardiomyopathy  Aortic stenosis  Pitting edema  - Sees Dr. Rasheed outpatient  - Recently at [facility] and underwent a cardiac cath, was deemed too frail to undergo a TAVR at that time, especially with progressive renal insufficiency, follows heart failure clinic and they are still proceeding with work-up for possible TAVR in the near future.  - According to care everywhere records last 2D echo 2/1/23 showed EF 33% with moderate to severe aortic stenosis  - Most recently admitted to [facility] in January due to anasarca  - Continue home diuretics, may follow nephrology recommendations  - EKG ordered and shows normal sinus rhythm  - Check proBNP  - Palliative team consulted     Urinary retention  - Has chronic Diaz catheter to bedside drainage, present on admission  - According to care everywhere records was followed by urology at recent admission to  [facility]  - Continue home Flomax  - Check a U/A  -urology consulted for phimosis 2/27. Pending cysto     Proximal A-fib  - EKG showed NSR  - Continue home amiodarone and Eliquis     DVT prophylaxis  -Continue home Eliquis     COPD  Pulmonary nodule: 13mm RLL with an internal solid 3mm nodule, and a 6mm RLL  Recent COVID infection requiring hospitalization at [facility] January 2023  Current smoker  - Patient had a hospital follow up with pulmonologist, Dr. 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  He is alert oriented x3.     Admission Status:  I believe this patient meets inpatient status.     I discussed the patient's findings and my recommendations with patient.     03/03/23      Electronically signed by Segun Rodrigues MD, 03/03/23, 16:57 EST.  [facility]ist [facility]

## 2023-03-03 NOTE — PLAN OF CARE
"Goal Outcome Evaluation:              Outcome Evaluation: Pt is a 78 y/o male that is a [facility] resident with \"seizure-like\" activity.  Blood sugar 30 at [facility].  Chest X-ray:  B extensive multi-focal infiltrates.  Dx:  Hypoglycemia.  Pt reports he ambulated with RW at [facility], though nsg assisted with ADLs.  This date pt required min assist to come to EOB and min assist to stand. He took a few steps to window and back to bed without LOB, though noted fatigue.  Sats WFL.  Recommend return to [facility] with therapy as needed.  No further acute OT needs.  "

## 2023-03-03 NOTE — THERAPY EVALUATION
"Patient Name: Holger Ibrahim  : 1945    MRN: [id number]                              Today's Date: 3/3/2023       Admit Date: 2023    Visit Dx:     ICD-10-CM ICD-9-CM   1. Hypoglycemia  E16.2 251.2     Patient Active Problem List   Diagnosis   • Aortic stenosis, moderate   • Abnormal ankle brachial index (JYOTHI)   • PVD (peripheral vascular disease) with claudication ([facility])   • Diastolic dysfunction   • Essential hypertension   • Hypoglycemia   • Unstageable pressure ulcer of sacral region (HCC)   • Unstageable pressure ulcer of right heel (HCC)   • Unstageable pressure ulcer of left heel (HCC)     Past Medical History:   Diagnosis Date   • Alcoholism (HCC)     hx of   • DM2 (diabetes mellitus, type 2) (HCC)    • Hyperlipidemia    • Hypertension    • PVD (peripheral vascular disease) ([facility])     PTA      Past Surgical History:   Procedure Laterality Date   • APPENDECTOMY     • CARDIAC CATHETERIZATION N/A 2019    Procedure: PERIPHERAL ANGIOGRAPHY, Rt popliteal PTA;  Surgeon: Jonny Ferguson MD;  Location: [facility] CATH INVASIVE LOCATION;  Service: Cardiovascular   • EYE SURGERY Right    • HERNIA REPAIR     • LEG SURGERY  2020   • POPLITEAL ARTERY ANGIOPLASTY Right 2017   • SPLENECTOMY      partial spleen removal      General Information     Row Name 23 1635          OT Time and Intention    Document Type evaluation  -SR     Mode of Treatment occupational therapy  -SR     Row Name 23 1635          Occupational Profile    Reason for Services/Referral (Occupational Profile) Pt is a 78 y/o male that is a [facility] resident with \"seizure-like\" activity.  Blood sugar 30 at [facility].  Chest X-ray:  B extensive multi-focal infiltrates.  Dx:  Hypoglycemia.  Pt reports he ambulated with RW at [facility], though nsg assisted with ADLs.  -SR     Row Name 23 1635          Living Environment    People in Home facility resident  -SR     Row Name 23 1635          Cognition    Orientation Status " (Cognition) oriented x 3  -SR     Row Name 03/03/23 1635          Safety Issues, Functional Mobility    Impairments Affecting Function (Mobility) balance;endurance/activity tolerance;strength  -SR           User Key  (r) = Recorded By, (t) = Taken By, (c) = Cosigned By    Initials Name Provider Type    Bela Alva OT Occupational Therapist                 Mobility/ADL's     Row Name 03/03/23 1636          Bed Mobility    Bed Mobility bed mobility (all) activities;supine-sit  -SR     Supine-Sit [place] (Bed Mobility) contact guard;minimum assist (75% patient effort);1 person assist  -SR     Row Name 03/03/23 1636          Sit-Stand Transfer    Sit-Stand [place] (Transfers) contact guard;minimum assist (75% patient effort);1 person assist  -SR     Assistive Device (Sit-Stand Transfers) walker, front-wheeled  -SR     Row Name 03/03/23 1636          Functional Mobility    Functional Mobility- Ind. Level contact guard assist  -SR     Functional Mobility- Device walker, front-wheeled  -SR           User Key  (r) = Recorded By, (t) = Taken By, (c) = Cosigned By    Initials Name Provider Type    SR Bela Pritchett OT Occupational Therapist               Obj/Interventions     Row Name 03/03/23 1639          Range of Motion Comprehensive    General Range of Motion no range of motion deficits identified  -SR     Row Name 03/03/23 1639          Strength Comprehensive (MMT)    General Manual Muscle Testing (MMT) Assessment no strength deficits identified  -SR     Row Name 03/03/23 1639          Balance    Balance Interventions sitting;standing;sit to stand;supported;static;dynamic;minimal challenge  -SR           User Key  (r) = Recorded By, (t) = Taken By, (c) = Cosigned By    Initials Name Provider Type    SR Bela Pritchett OT Occupational Therapist               Goals/Plan    No documentation.                Clinical Impression     Row Name 03/03/23 1639          Pain Assessment     "Pretreatment Pain Rating 0/10 - no pain  -SR     Posttreatment Pain Rating 0/10 - no pain  -SR     Row Name 03/03/23 1639          Plan of Care Review    Outcome Evaluation Pt is a 78 y/o male that is a [facility] resident with \"seizure-like\" activity.  Blood sugar 30 at [facility].  Chest X-ray:  B extensive multi-focal infiltrates.  Dx:  Hypoglycemia.  Pt reports he ambulated with RW at [facility], though nsg assisted with ADLs.  This date pt required min assist to come to EOB and min assist to stand. He took a few steps to window and back to bed without LOB, though noted fatigue.  Sats WFL.  Recommend return to [facility] with therapy as needed.  No further acute OT needs.  -SR     Row Name 03/03/23 1639          Therapy Plan Review/Discharge Plan (OT)    Anticipated Discharge Disposition (OT) extended care [facility]  -SR     Row Name 03/03/23 1639          Positioning and Restraints    Pre-Treatment Position in bed  -SR     Post Treatment Position bed  -SR     In Bed call light within reach;encouraged to call for assist;exit alarm on  -SR           User Key  (r) = Recorded By, (t) = Taken By, (c) = Cosigned By    Initials Name Provider Type    SR Bela Pritchett, OT Occupational Therapist               Outcome Measures     Row Name 03/03/23 0815          How much help from another person do you currently need...    Turning from your back to your side while in flat bed without using bedrails? 3  -TL     Moving from lying on back to sitting on the side of a flat bed without bedrails? 3  -TL     Moving to and from a bed to a chair (including a wheelchair)? 3  -TL     Standing up from a chair using your arms (e.g., wheelchair, bedside chair)? 3  -TL     Climbing 3-5 steps with a railing? 1  -TL     To walk in hospital room? 2  -TL     AM-PAC 6 Clicks Score (PT) 15  -TL     Highest level of mobility 4 --> Transferred to chair/commode  -TL           User Key  (r) = Recorded By, (t) = Taken By, (c) = Cosigned By    Initials Name Provider " "Type    TL Jim Martinez RN Registered Nurse                Occupational Therapy Education     Title: PT OT SLP Therapies (In Progress)     Topic: Occupational Therapy (In Progress)     Point: Body mechanics (In Progress)     Description:   Instruct learner(s) on proper positioning and spine alignment during self-care, functional mobility activities and/or exercises.              Learning Progress Summary           Patient Acceptance, E,TB, NR by  at 3/3/2023 1643                               User Key     Initials Effective Dates Name Provider Type Discipline     06/16/21 -  Bela Pritchett OT Occupational Therapist OT              OT Recommendation and Plan     Plan of Care Review  Outcome Evaluation: Pt is a 78 y/o male that is a [facility] resident with \"seizure-like\" activity.  Blood sugar 30 at [facility].  Chest X-ray:  B extensive multi-focal infiltrates.  Dx:  Hypoglycemia.  Pt reports he ambulated with RW at [facility], though nsg assisted with ADLs.  This date pt required min assist to come to EOB and min assist to stand. He took a few steps to window and back to bed without LOB, though noted fatigue.  Sats WFL.  Recommend return to [facility] with therapy as needed.  No further acute OT needs.     Time Calculation:    Time Calculation- OT     Row Name 03/03/23 1643             Time Calculation- OT    OT Start Time 1615  -SR      OT Stop Time 1630  -      OT Time Calculation (min) 15 min  -SR      Total Timed Code Minutes- OT 0 minute(s)  -SR      OT Received On 03/03/23  -            User Key  (r) = Recorded By, (t) = Taken By, (c) = Cosigned By    Initials Name Provider Type    SR Bela Pritchett OT Occupational Therapist              Therapy Charges for Today     Code Description Service Date Service Provider Modifiers Qty    [id number]  OT EVAL LOW COMPLEXITY 3 3/3/2023 Bela Pritchett OT GO 1               Bela Pritchett OT  3/3/2023  "

## 2023-03-03 NOTE — PROGRESS NOTES
"  [facility] DAILY PROGRESS NOTE    Patient Identification  Name: Holger Ibrahim  Age: 77 y.o.  Sex: male  :  1945  MRN: [id number]    Date: 3/3/2023             Subjective:  Interval History: Failed voiding trial    Objective:    Scheduled Meds:amiodarone, 200 mg, Oral, TID  apixaban, 5 mg, Oral, Daily  atorvastatin, 40 mg, Oral, Nightly  bumetanide, 2 mg, Intravenous, Q12H  clopidogrel, 75 mg, Oral, Daily  insulin lispro, 2-7 Units, Subcutaneous, TID With Meals  pantoprazole, 40 mg, Oral, Daily  sodium chloride, 10 mL, Intravenous, Q12H  tamsulosin, 0.4 mg, Oral, Daily      Continuous Infusions:   PRN Meds:•  acetaminophen **OR** acetaminophen **OR** acetaminophen  •  dextrose  •  dextrose  •  dextrose  •  glucagon (human recombinant)  •  melatonin  •  nitroglycerin  •  ondansetron  •  [COMPLETED] Insert Peripheral IV **AND** sodium chloride  •  sodium chloride  •  sodium chloride    Vital signs in last 24 hours:  Temp:  [96.7 °F (35.9 °C)-98 °F (36.7 °C)] 96.7 °F (35.9 °C)  Heart Rate:  [69-74] 71  Resp:  [10-15] 12  BP: (105-135)/(68-98) 105/75    Intake/Output:    Intake/Output Summary (Last 24 hours) at 3/3/2023 0837  Last data filed at 3/3/2023 0526  Gross per 24 hour   Intake 1610 ml   Output 1400 ml   Net 210 ml       Exam:  /75 (BP Location: Right arm, Patient Position: Lying)   Pulse 71   Temp 96.7 °F (35.9 °C) (Oral)   Resp 12   Ht 177.8 cm (70\")   Wt 73.1 kg (161 lb 2.5 oz)   SpO2 97%   BMI 23.12 kg/m²     General Appearance:    Alert, cooperative, no distress, appears stated age               Abdomen:     Soft, ND   :    No suprapubic distention          Data Review:  All labs (24hrs):   Recent Results (from the past 24 hour(s))   POC Glucose Once    Collection Time: 23 11:54 AM    Specimen: Blood   Result Value Ref Range    Glucose 96 70 - 105 mg/dL   POC Glucose Once    Collection Time: 23  4:26 PM    Specimen: Blood   Result Value Ref Range    Glucose 194 (H) " 70 - 105 mg/dL   Type & Screen    Collection Time: 03/02/23  5:27 PM    Specimen: Blood   Result Value Ref Range    ABO Type A     RH type Positive     Antibody Screen Negative     T&S Expiration Date 3/5/2023 11:59:59 PM    POC Glucose Once    Collection Time: 03/02/23  7:34 PM    Specimen: Blood   Result Value Ref Range    Glucose 225 (H) 70 - 105 mg/dL   Prepare RBC, 1 Units    Collection Time: 03/02/23  7:58 PM   Result Value Ref Range    Product Code A5544T51     Unit Number [id number]     UNIT  ABO A     UNIT  RH POS     Crossmatch Interpretation Compatible     Dispense Status IS     Blood Expiration Date [id number]     Blood Type Barcode 6200    POC Glucose Once    Collection Time: 03/03/23  7:29 AM    Specimen: Blood   Result Value Ref Range    Glucose 150 (H) 70 - 105 mg/dL      Imaging Results (Last 24 Hours)     ** No results found for the last 24 hours. **           Assessment:    Hypoglycemia    Unstageable pressure ulcer of sacral region (HCC)    Unstageable pressure ulcer of right heel (HCC)    Unstageable pressure ulcer of left heel (HCC)    Large volume retention  BPH with obstruction  Phimosis     Plan:      Continue Flomax, failed voiding trial he will need to be discharged with his Diaz in place  We will plan to see him in the office in 1 week and plan intervention  Ok for discharge from  standpoint with Diaz in place    Prince Frazier MD  [facility]  [facility] [facility] [facility] [address], [address]  [place] [address]  Office: [phone]  Cell: [phone]  Also available via Epic [employer]  03/03/23  08:37 EST

## 2023-03-03 NOTE — PLAN OF CARE
Goal Outcome Evaluation:           Progress: no change  Outcome Evaluation: Pt rested in bed with no complaints, Pt received 1 Unit of PRBCs tolerated well, pt able to make needs known, VSS. Call light within reach, will continue to monitor

## 2023-03-04 NOTE — PLAN OF CARE
Goal Outcome Evaluation:  Plan of Care Reviewed With: patient        Progress: no change   See previous note.

## 2023-03-04 NOTE — PROGRESS NOTES
[facility] Medicine Services Daily Progress Note    Patient Name: Holger Ibrahim  : 1945  MRN: [id number]  Primary Care Physician:  Rachel Mckeon MD  Date of admission: 2023      Subjective      Chief Complaint: Hypoglycemia from [facility]     Patient Reports he is doing all right.  No complaints.  Renal function finally stabilizing.  Had some issues with hypoglycemia and hyperglycemia over the last 24 hours.  Family requesting a thoracentesis.  Chest x-ray two-view ordered to assess for any pleural effusions.     ROS negative except as above    Objective      Vitals:   Temp:  [97.3 °F (36.3 °C)-98.3 °F (36.8 °C)] 97.3 °F (36.3 °C)  Heart Rate:  [69-77] 69  Resp:  [11-17] 12  BP: (107-115)/(62-70) 115/70  Flow (L/min):  [2] 2    Vitals reviewed.   Constitutional:       Comments: Multiple pressure injury wounds including feet. Diaz replaced.  Hematuria noted  HENT:      Head: Normocephalic.   Cardiovascular:      Rate and Rhythm: Normal rate.   Pulmonary:      Effort: Pulmonary effort is normal.   Abdominal:      General: Abdomen is flat.      Palpations: Abdomen is soft.   Musculoskeletal:         General: Normal range of motion.      Cervical back: Normal range of motion.   Skin:     General: Skin is warm.   Neurological:      General: No focal deficit present.      Mental Status: He is alert and oriented to person, place, and time.   Psychiatric:         Mood and Affect: Mood normal.         Behavior: Behavior normal.        Result Review    Result Review:  I have personally reviewed the results from the time of this admission to 3/4/2023 15:32 EST and agree with these findings:  [x]  Laboratory  []  Microbiology  []  Radiology  []  EKG/Telemetry   []  Cardiology/Vascular   []  Pathology  []  Old records  []  Other:  Most notable findings include: Creatinine around 4    Wounds (last 24 hours)     LDA Wound     Row Name 23 1125 23 0821 23 1929        Wound 02/23/23 1437 medial coccyx Pressure Injury    Wound - Properties Group Placement Date: 02/23/23 -SH Placement Time: 1437 -SH Present on Hospital Admission: Y  -SH Orientation: medial  -SH Location: coccyx  -SH Primary Wound Type: Pressure inj  -SH Additional Comments: stage 2  -SH    Dressing Appearance -- dry;intact;no drainage  -MD --    Closure -- Adhesive bandage  -MD VENITA  -AF    Base -- white  -MD --    Dressing Care dressing changed  -MD dressing changed  -MD --    Retired Wound - Properties Group Placement Date: 02/23/23 -SH Placement Time: 1437 -SH Present on Hospital Admission: Y  -SH Orientation: medial  -SH Location: coccyx  -SH Primary Wound Type: Pressure inj  -SH Additional Comments: stage 2  -SH    Retired Wound - Properties Group Date first assessed: 02/23/23 -SH Time first assessed: 1437 -SH Present on Hospital Admission: Y  -SH Location: coccyx  -SH Primary Wound Type: Pressure inj  -SH Additional Comments: stage 2  -SH       Wound 02/23/23 1712 Right posterior heel Pressure Injury    Wound - Properties Group Placement Date: 02/23/23 -BS Placement Time: 1712 -BS Present on Hospital Admission: Y  -BS Side: Right  -BS Orientation: posterior  -BS Location: heel  -BS Primary Wound Type: Pressure inj  -BS    Dressing Appearance -- open to air  -MD --    Closure -- Open to air  -MD Open to air  -AF    Base -- maroon/purple  -MD --    Dressing Care -- open to air  -MD --    Retired Wound - Properties Group Placement Date: 02/23/23  -BS Placement Time: 1712 -BS Present on Hospital Admission: Y  -BS Side: Right  -BS Orientation: posterior  -BS Location: heel  -BS Primary Wound Type: Pressure inj  -BS    Retired Wound - Properties Group Date first assessed: 02/23/23  -BS Time first assessed: 1712 -BS Present on Hospital Admission: Y  -BS Side: Right  -BS Location: heel  -BS Primary Wound Type: Pressure inj  -BS       Wound 02/23/23 1715 Left posterior heel    Wound - Properties Group  Placement Date: 02/23/23  -BS Placement Time: 1715 -BS Side: Left  -BS Orientation: posterior  -BS Location: heel  -BS    Dressing Appearance -- -- open to air  -AF    Dressing Care -- open to air  -MD --    Retired Wound - Properties Group Placement Date: 02/23/23  -BS Placement Time: 1715 -BS Side: Left  -BS Orientation: posterior  -BS Location: heel  -BS    Retired Wound - Properties Group Date first assessed: 02/23/23  -BS Time first assessed: 1715 -BS Side: Left  -BS Location: heel  -BS    Row Name 03/03/23 1620             Wound 02/23/23 1437 medial coccyx Pressure Injury    Wound - Properties Group Placement Date: 02/23/23  -SH Placement Time: 1437 -SH Present on Hospital Admission: Y  -SH Orientation: medial  -SH Location: coccyx  -SH Primary Wound Type: Pressure inj  -SH Additional Comments: stage 2  -SH    Closure VENITA  -TL      Periwound Temperature warm  -TL      Retired Wound - Properties Group Placement Date: 02/23/23  -SH Placement Time: 1437  -SH Present on Hospital Admission: Y  -SH Orientation: medial  -SH Location: coccyx  -SH Primary Wound Type: Pressure inj  -SH Additional Comments: stage 2  -SH    Retired Wound - Properties Group Date first assessed: 02/23/23  -SH Time first assessed: 1437 -SH Present on Hospital Admission: Y  -SH Location: coccyx  -SH Primary Wound Type: Pressure inj  -SH Additional Comments: stage 2  -SH       Wound 02/23/23 1712 Right posterior heel Pressure Injury    Wound - Properties Group Placement Date: 02/23/23  -BS Placement Time: 1712 -BS Present on Hospital Admission: Y  -BS Side: Right  -BS Orientation: posterior  -BS Location: heel  -BS Primary Wound Type: Pressure inj  -BS    Closure Open to air  -TL      Base maroon/purple  -TL      Periwound blanchable  -TL      Drainage Amount none  -TL      Retired Wound - Properties Group Placement Date: 02/23/23  -BS Placement Time: 1712 -BS Present on Hospital Admission: Y  -BS Side: Right  -BS Orientation: posterior   -BS Location: heel  -BS Primary Wound Type: Pressure inj  -BS    Retired Wound - Properties Group Date first assessed: 02/23/23 -BS Time first assessed: 1712 -BS Present on Hospital Admission: Y  -BS Side: Right  -BS Location: heel  -BS Primary Wound Type: Pressure inj  -BS       Wound 02/23/23 1715 Left posterior heel    Wound - Properties Group Placement Date: 02/23/23 -BS Placement Time: 1715 -BS Side: Left  -BS Orientation: posterior  -BS Location: heel  -BS    Closure Open to air  -TL      Base dry;pink  -TL      Edges callused  -TL      Retired Wound - Properties Group Placement Date: 02/23/23  -BS Placement Time: 1715 -BS Side: Left  -BS Orientation: posterior  -BS Location: heel  -BS    Retired Wound - Properties Group Date first assessed: 02/23/23 -BS Time first assessed: 1715  -BS Side: Left  -BS Location: heel  -BS          User Key  (r) = Recorded By, (t) = Taken By, (c) = Cosigned By    Initials Name Provider Type    Nan Platt LPN Licensed Nurse    Yudi Mireles RN Registered Nurse    Jim Barlow RN Registered Nurse    Stella Springer RN Registered Nurse    Ledy Saul LPN Licensed Nurse                  Assessment & Plan       Brief Patient Summary:  Holger Ibrahim is a 77 y.o. male who presents with hypoglycemia from [facility]        amiodarone, 200 mg, Oral, TID  apixaban, 5 mg, Oral, Daily  atorvastatin, 40 mg, Oral, Nightly  clopidogrel, 75 mg, Oral, Daily  pantoprazole, 40 mg, Oral, Daily  sodium chloride, 10 mL, Intravenous, Q12H  tamsulosin, 0.4 mg, Oral, Daily        Active Hospital Problems:          Active Hospital Problems     Diagnosis     • **Hypoglycemia     • Unstageable pressure ulcer of sacral region (HCC)     • Unstageable pressure ulcer of right heel (HCC)     • Unstageable pressure ulcer of left heel (HCC)        Plan:   Acute on chronic anemia  Patient transfused on March 2.  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  We will keep on low-dose sliding scale     SHAHRAM  Stage IV kidney disease  - Creatinine 3.48, baseline 1.6  - BUN 57, potassium 4.9, GFR 17.6  - Normally follows [facility]  - Nephrology consulted  - Avoid nephrotoxic medications/dyes if at all possible  -Hyperkalemia on February 24.  Lokelma ordered. K+ normal  - sodium finally 130+ on 3/1  --Bun/cr finally stabilized March 3 and march 4 around 4.0     Iron deficiency anemia  - Hemoglobin 8.3, appears to be chronic  - Occult stool  - Monitor labs     HTN  HLD  - /71  - Lipid panel ordered  - Continue statin and Norvasc     Diastolic dysfunction  Nonischemic cardiomyopathy  Aortic stenosis  Pitting edema  - Sees Dr. Rasheed outpatient  - Recently at [facility] and underwent a cardiac cath, was deemed too frail to undergo a TAVR at that time, especially with progressive renal insufficiency, follows heart failure clinic and they are still proceeding with work-up for possible TAVR in the near future.  - According to care everywhere records last 2D echo 2/1/23 showed EF 33% with moderate to severe aortic stenosis  - Most recently admitted to [facility] in January due to anasarca  - Continue home diuretics, may follow nephrology recommendations  - EKG ordered and shows normal sinus rhythm  - Check proBNP  - Palliative team consulted     Urinary retention  - Has chronic Diaz catheter to bedside drainage, present on admission  - According to care everywhere records was followed by urology at recent admission to [facility]  - Continue home Flomax  - Check a U/A  -urology consulted for phimosis 2/27. Pending  cysto     Proximal A-fib  - EKG showed NSR  - Continue home amiodarone and Eliquis     DVT prophylaxis  -Continue home Eliquis     COPD  Pulmonary nodule: 13mm RLL with an internal solid 3mm nodule, and a 6mm RLL  Recent COVID infection requiring hospitalization at [facility] January 2023  Current smoker  - Patient had a hospital follow up with pulmonologist, Dr. Cobian 1/26/2023 and was sent to ER for admission due to anasarca  - Pulmonary note reviewed in care everywhere  - Currently on room air and denies shortness of breath  - Smoking cessation education     Right leg claudication s/p popliteal artery angioplasty 2019     D10 drip stopped monitor glucose        Return to [facility] once sodium stable and nephro clears     CODE STATUS: DNR  Lengthy discussion had with patient on CODE STATUS.  He is alert oriented x3.     Admission Status:  I believe this patient meets inpatient status.     I discussed the patient's findings and my recommendations with patient.     03/04/23      Electronically signed by Segun Rodrigues MD, 03/04/23, 15:32 EST.  [facility] Hospitalist [facility]

## 2023-03-04 NOTE — PROGRESS NOTES
[facility] Progress Note      Patient Name: Hloger Ibrahim  : 1945  MRN: [id number]  Primary Care Physician:  Rachel Mckeon MD  Date of admission: 2023    Subjective     Interval History:     Overnight no event  Patient lying in bed  Continues to be on supplemental oxygen  Eating breakfast  Denies any chest pain shortness of palpitations  Feeling tired fatigue    Diaz catheter in place    Objective     Vitals:   Temp:  [97.3 °F (36.3 °C)-98.3 °F (36.8 °C)] 97.6 °F (36.4 °C)  Heart Rate:  [68-77] 70  Resp:  [11-17] 15  BP: (103-115)/(59-65) 108/62  Flow (L/min):  [2] 2    Intake/Output Summary (Last 24 hours) at 3/4/2023 0958  Last data filed at 3/4/2023 0821  Gross per 24 hour   Intake 2424 ml   Output 425 ml   Net 1999 ml       Physical Exam:    General Appearance: Chronically ill and deconditioned with bilateral temporal wasting  HEENT: oral mucosa normal, nonicteric sclera.  Poor oral hygiene with gingivitis many missing tooth  Neck: supple, no JVD  Lungs: Clear bilateral  Heart: RRR, normal S1 and S2 with systolic ejection murmur   Abdomen: soft, nondistended  Extremities: Trace to 1+ edema bilateral lower extremities  Neuro: Awake with diffuse muscle wasting and muscle atrophy    Scheduled Meds:     amiodarone, 200 mg, Oral, TID  apixaban, 5 mg, Oral, Daily  atorvastatin, 40 mg, Oral, Nightly  bumetanide, 1 mg, Intravenous, Q12H  clopidogrel, 75 mg, Oral, Daily  insulin lispro, 2-7 Units, Subcutaneous, TID With Meals  pantoprazole, 40 mg, Oral, Daily  sodium chloride, 10 mL, Intravenous, Q12H  tamsulosin, 0.4 mg, Oral, Daily      IV Meds:        Results Reviewed:   I have personally reviewed the results from the time of this admission to 3/4/2023 09:58 EST     Results from last 7 days   Lab Units 23  0618 23  1015 23  0438   SODIUM mmol/L 132* 130* 130*   POTASSIUM mmol/L 4.6 4.4 4.5   CHLORIDE mmol/L 96* 93* 94*   CO2 mmol/L 23.0 22.0 23.0   BUN  mg/dL 80* 79* 71*   CREATININE mg/dL 4.06* 4.05* 4.18*   CALCIUM mg/dL 8.3* 8.2* 8.1*   BILIRUBIN mg/dL 0.2 0.3 <0.2   ALK PHOS U/L 114 112 107   ALT (SGPT) U/L 18 19 18   AST (SGOT) U/L 33 25 23   GLUCOSE mg/dL 124* 172* 211*     Estimated Creatinine Clearance: 16.3 mL/min (A) (by C-G formula based on SCr of 4.06 mg/dL (H)).  Results from last 7 days   Lab Units 03/04/23  0618 03/03/23  1015 03/02/23  0438   MAGNESIUM mg/dL 1.6 1.6 1.7   PHOSPHORUS mg/dL 6.5* 6.0* 5.7*         Results from last 7 days   Lab Units 03/04/23  0618 03/03/23  1015 03/02/23  0438 03/01/23  1023 02/28/23  1601   WBC 10*3/mm3 4.80 4.00 3.20* 3.30* 3.60   HEMOGLOBIN g/dL 9.1* 9.3* 7.3* 8.0* 8.7*   PLATELETS 10*3/mm3 344 351 323 338 336           Assessment / Plan     ASSESSMENT:    1. Acute kidney injury initially thought to be secondary to cardiorenal syndrome and diabetic nephropathy.  Versus progression of the underlying condition.  Creatinine has been rising progressively for the last couple years with a risk of progression requiring hemodialysis in the future.   2. Chronic kidney disease stage III creatinine baseline around 1.6-1.8 mg/dL  3. Obstructive uropathy secondary to benign prostatic hypertrophy on tamsulosin.  Followed by urology  4.  Acute on chronic systolic congestive failure.  Ejection fraction of 33% with aortic stenosis..  Currently on atorvastatin, bumetanide, clopidogrel  5.  Hypervolemic hyponatremia .  Sodium slowly improving with diuresis  6. History of hypertension with CKD.  Blood pressure running low at this time.  Off antihypertensives  7. History of diabetes mellitus with CKD over 30 years with extensive complication including retinopathy, nephropathy   8. History of aortic stenosis.  Due to his severe deconditioning and poor performance status patient not a candidate for TAVR  9.  Chronic normocytic anemia.  Etiology multifactorial nutritional, chronic illness and CKD following H&H closely  10.  Sacral ulcers  after primary team followed by wound care  11..  Chronic atrial fibrillation rate controlled on amiodarone and chronic anticoagulation on apixaban  12.  Malnutrition followed closely by dietary.  Albumin 2.9  13.  Hyperphosphatemia secondary to chronic kidney disease,  I will hold off binders for now, and will not limit his dietary intake due to his malnutrition.  Will follow trend for now    PLAN:  -Follow renal function closely, if patient at risk of requiring hemodialysis in the future due to progressive renal dysfunction and longstanding diabetes   -Volume status improving electrolytes stable  -We will obtain urinalysis and UPCR  -Avoid nephrotoxins  -Adjust medications to GFR    Thank you for allowing us to precipitate from this patient care      Mickey Araiza MD  03/04/23  09:58 EST    [facility]  [phone]

## 2023-03-04 NOTE — NURSING NOTE
Pt blood sugar was 60 at 0554 so nurse pushed D5 and gave orange juice. Blood sugar was rechecked at 0609 and was 463 and rechecked at 0611 and was 444. MD notified.

## 2023-03-04 NOTE — PROGRESS NOTES
"Subjective   Holger Ibrahim is a 77 y.o. male.   Seen for diabetes f/u.  Episodes of very high sugars, not sustained.  Eating well.      Objective     /70 (BP Location: Right arm, Patient Position: Lying)   Pulse 69   Temp 97.3 °F (36.3 °C) (Oral)   Resp 12   Ht 177.8 cm (70\")   Wt 73.1 kg (161 lb 2.5 oz)   SpO2 100%   BMI 23.12 kg/m²   Blood sugar  444 @ 6am, 124 @ 7:30 am,  76 @ lunch; cr 4.06    ASSESSMENT    Patient is stable    PLAN    Discussed w bedside nurse & Dr. Rodrigues:  Continue low dose lispro sliding scale only to avoid the lows. He is safer w high sugars than low.         Kym Aparicio MD  3/4/2023  12:26 EST    "

## 2023-03-04 NOTE — NURSING NOTE
At 2345 nurse checked on pt and pt was asleep but talking. At 2348 aid took pt blood glucose and it was 43. At 2352 pt was given dextrose iv push. Pt given 240 of apple juice and pack of peanut butter crackers.  At 0001 nurse took blood glucose and it was 81. Pt was given 240 of apple juice and pack of peanut butter crackers. At 0041 nurse took blood glucose and it was 94. Pt was given 120 of cranberry juice.

## 2023-03-04 NOTE — PLAN OF CARE
Goal Outcome Evaluation:  Patient is pleasant. No complaints throughout the shift. Q2 turn. S2 coccyx- mepliex changed. CXR today showing possible PNA- view results. Expected to return to [facility] at D/C. Will continue to monitor.

## 2023-03-05 NOTE — CONSULTS
"Group: [facility]         CONSULT NOTE    Patient Identification:  Holger Ibrahim  77 y.o.  male  1945  [id number]            Requesting physician: Attending physician    Reason for Consultation: Abnormal CT scan      History of Present Illness:    77 y.o. male who presented to [facility] on 2/23/2023 from [facility] by EMS for hypoglycemia.  EMS reported they were called for \"seizure-like\" activity.  When they arrived patient was found to have a blood sugar of 30.  He was given glucagon IM in route to the hospital.  Blood sugar on arrival here was 58.    CT scan of the chest no pulmonary embolism, 2 groundglass opacities noted in the left upper lobe    Assessment:    2 groundglass opacities in the left upper lobe  Differential diagnosis pneumonia versus malignancy  Alcoholism  Diabetes mellitus  Hyperlipidemia  Hypertension      Recommendations:    Antibiotics start p.o. Augmentin for possible aspiration  Repeat CT scan of the chest without contrast in 6 weeks    Thiamine and folate  Currently on Plavix and Eliquis  Amiodarone  Bumex 1 mg every 12              Review of Sytems:  Review of Systems   Respiratory: Positive for cough and shortness of breath.    Cardiovascular: Negative for chest pain, palpitations and leg swelling.       Past Medical History:  Past Medical History:   Diagnosis Date   • Alcoholism (HCC)     hx of   • DM2 (diabetes mellitus, type 2) ([facility])    • Hyperlipidemia    • Hypertension    • PVD (peripheral vascular disease) ([facility])     PTA 2017       Past Surgical History:  Past Surgical History:   Procedure Laterality Date   • APPENDECTOMY     • CARDIAC CATHETERIZATION N/A 11/5/2019    Procedure: PERIPHERAL ANGIOGRAPHY, Rt popliteal PTA;  Surgeon: Jonny Ferguson MD;  Location: [facility] INVASIVE LOCATION;  Service: Cardiovascular   • EYE SURGERY Right    • HERNIA REPAIR     • LEG SURGERY  01/2020   • POPLITEAL ARTERY ANGIOPLASTY Right 2017   • SPLENECTOMY      " "partial spleen removal        Home Meds:  Medications Prior to Admission   Medication Sig Dispense Refill Last Dose   • amiodarone (PACERONE) 200 MG tablet Take 200 mg by mouth 3 (Three) Times a Day.      • apixaban (ELIQUIS) 5 MG tablet tablet Take 5 mg by mouth Every 12 (Twelve) Hours.      • atorvastatin (LIPITOR) 40 MG tablet Take 40 mg by mouth Every Night.      • clopidogrel (PLAVIX) 75 MG tablet Take 75 mg by mouth Daily.      • furosemide (LASIX) 40 MG tablet Take 40 mg by mouth 2 (Two) Times a Day.      • Glucagon (Baqsimi One Pack) 3 MG/DOSE powder 3 mg into the nostril(s) as directed by provider As Needed.      • glucose blood test strip TEST 2 TIMES DAILY AS INSTRUCTED      • Insulin Glargine (LANTUS SOLOSTAR) 100 UNIT/ML injection pen Inject 10 Units under the skin into the appropriate area as directed Every Night.      • Insulin Lispro, 1 Unit Dial, (HUMALOG) 100 UNIT/ML solution pen-injector Inject  under the skin into the appropriate area as directed 3 (Three) Times a Day. SSI:  151-200 2U  201-250 4U  251-300 6U  301-350 8U  351-400 10U      • Insulin Pen Needle 29G X 12.7MM misc ONE MISCELLANEOUS EVERY EVENING      • Insulin Syringe-Needle U-100 30G X 1/2\" 0.5 ML misc USES TWICE A DAY AS DIRECTED.  DX: 250.00      • Multiple Vitamins-Minerals (MULTIVITAMIN ADULT) tablet Take 1 mg by mouth Daily.      • pantoprazole (PROTONIX) 40 MG EC tablet Take 40 mg by mouth Daily.      • tamsulosin (FLOMAX) 0.4 MG capsule 24 hr capsule Take 0.4 mg by mouth Daily.          Allergies:  Allergies   Allergen Reactions   • Contrast Dye (Echo Or Unknown Ct/Mr) Itching       Social History:   Social History     Socioeconomic History   • Marital status:    Tobacco Use   • Smoking status: Some Days     Packs/day: 0.25     Types: Cigarettes   • Smokeless tobacco: Never   Vaping Use   • Vaping Use: Never used   Substance and Sexual Activity   • Alcohol use: No   • Drug use: No       Family History:  Family History " "  Problem Relation Age of Onset   • Cancer Mother    • Hypertension Father    • Hypertension Brother        Physical Exam:  BP 98/59 (BP Location: Right arm, Patient Position: Lying)   Pulse 67   Temp 97.7 °F (36.5 °C) (Oral)   Resp 12   Ht 177.8 cm (70\")   Wt 73.1 kg (161 lb 2.5 oz)   SpO2 100%   BMI 23.12 kg/m²  Body mass index is 23.12 kg/m². 100% 73.1 kg (161 lb 2.5 oz)  Physical Exam  Cardiovascular:      Heart sounds: No murmur heard.    No gallop.   Pulmonary:      Effort: No respiratory distress.      Breath sounds: No stridor. Rhonchi and rales present. No wheezing.   Chest:      Chest wall: No tenderness.         LABS:  Lab Results   Component Value Date    CALCIUM 8.3 (L) 03/04/2023    PHOS 6.5 (H) 03/04/2023     Results from last 7 days   Lab Units 03/04/23  0618 03/03/23  1015 03/02/23  0438   MAGNESIUM mg/dL 1.6 1.6 1.7   SODIUM mmol/L 132* 130* 130*   POTASSIUM mmol/L 4.6 4.4 4.5   CHLORIDE mmol/L 96* 93* 94*   CO2 mmol/L 23.0 22.0 23.0   BUN mg/dL 80* 79* 71*   CREATININE mg/dL 4.06* 4.05* 4.18*   GLUCOSE mg/dL 124* 172* 211*   CALCIUM mg/dL 8.3* 8.2* 8.1*   WBC 10*3/mm3 4.80 4.00 3.20*   HEMOGLOBIN g/dL 9.1* 9.3* 7.3*   PLATELETS 10*3/mm3 344 351 323   ALT (SGPT) U/L 18 19 18   AST (SGOT) U/L 33 25 23     No results found for: CKTOTAL, CKMB, CKMBINDEX, TROPONINI, TROPONINT                              Lab Results   Component Value Date    TSH 5.320 (H) 02/23/2023     Estimated Creatinine Clearance: 16.3 mL/min (A) (by C-G formula based on SCr of 4.06 mg/dL (H)).  Results from last 7 days   Lab Units 03/04/23  2046   NITRITE UA  Negative        Imaging:  Imaging Results (Last 24 Hours)     Procedure Component Value Units Date/Time    XR Chest PA & Lateral [[id number]] Collected: 03/04/23 1704     Updated: 03/04/23 1708    Narrative:      XR CHEST PA AND LATERAL    Date of Exam: 3/4/2023 1:19 PM EST    Indication: pleural effusions.    Comparison: February 25, 2023    Findings:  There is a mild " pulmonary edema pattern. There are moderate right and small left pleural effusions. There are bibasilar opacities. The heart and mediastinal contours appear stable. The osseous structures appear intact.      Impression:      Impression:  1.Mild pulmonary edema pattern.  2.Moderate right and small left pleural effusions.  3.Bibasilar opacities, which could reflect atelectasis or pneumonia.    Electronically Signed: Raad Chavez    3/4/2023 5:06 PM EST    Workstation ID: [id number]            Current Meds:   SCHEDULE  amiodarone, 200 mg, Oral, TID  apixaban, 5 mg, Oral, Daily  atorvastatin, 40 mg, Oral, Nightly  bumetanide, 1 mg, Intravenous, Q12H  clopidogrel, 75 mg, Oral, Daily  insulin lispro, 2-7 Units, Subcutaneous, TID With Meals  pantoprazole, 40 mg, Oral, Daily  sodium chloride, 10 mL, Intravenous, Q12H  tamsulosin, 0.4 mg, Oral, Daily      Infusions     PRNs  •  acetaminophen **OR** acetaminophen **OR** acetaminophen  •  dextrose  •  dextrose  •  dextrose  •  glucagon (human recombinant)  •  melatonin  •  nitroglycerin  •  ondansetron  •  [COMPLETED] Insert Peripheral IV **AND** sodium chloride  •  sodium chloride  •  sodium chloride        Chao Mayo MD  3/5/2023  10:28 EST      Much of this encounter note is an electronic transcription/translation of spoken language to printed text using Dragon Software.

## 2023-03-05 NOTE — PROGRESS NOTES
[facility] Medicine Services Daily Progress Note    Patient Name: Holger Ibrahim  : 1945  MRN: [id number]  Primary Care Physician:  Rachel Mckeon MD  Date of admission: 2023      Subjective      Chief Complaint: Hypoglycemia from [facility]     Patient Reports he is doing all right.  No complaints. No labs available despite orders. Unable to assess renal fxn/anemia. CXR shows pleural effusions. Pulm consulted      ROS negative except as above    Objective      Vitals:   Temp:  [96.8 °F (36 °C)-97.7 °F (36.5 °C)] 97.7 °F (36.5 °C)  Heart Rate:  [63-76] 67  Resp:  [12-20] 12  BP: ()/(59-75) 98/59  Flow (L/min):  [1-2] 1    Vitals reviewed.   Constitutional:       Comments: Multiple pressure injury wounds including feet. Diaz replaced.  Hematuria noted  HENT:      Head: Normocephalic.   Cardiovascular:      Rate and Rhythm: Normal rate.   Pulmonary:      Effort: Pulmonary effort is normal.   Abdominal:      General: Abdomen is flat.      Palpations: Abdomen is soft.   Musculoskeletal:         General: Normal range of motion.      Cervical back: Normal range of motion.   Skin:     General: Skin is warm.   Neurological:      General: No focal deficit present.      Mental Status: He is alert and oriented to person, place, and time.   Psychiatric:         Mood and Affect: Mood normal.         Behavior: Behavior normal.        Result Review    Result Review:  I have personally reviewed the results from the time of this admission to 3/5/2023 11:19 EST and agree with these findings:  [x]  Laboratory  []  Microbiology  []  Radiology  []  EKG/Telemetry   []  Cardiology/Vascular   []  Pathology  []  Old records  []  Other:  Most notable findings include: no labs available to review despite orders     Wounds (last 24 hours)     LDA Wound     Row Name 23 0822 23 1125       Wound 23 1437 medial coccyx Pressure Injury    Wound - Properties Group  Placement Date: 02/23/23 -SH Placement Time: 1437 -SH Present on Hospital Admission: Y  -SH Orientation: medial  -SH Location: coccyx  -SH Primary Wound Type: Pressure inj  -SH Additional Comments: stage 2  -SH    Pressure Injury Stage -- 2  -KW --    Dressing Appearance dry;intact;moist drainage  -MD moist drainage  -KW --    Closure Adhesive bandage  -MD Adhesive bandage  -KW --    Base white;pink;moist  -MD white  -KW --    Periwound Temperature -- warm  -KW --    Periwound Skin Turgor -- soft  -KW --    Drainage Amount -- scant  -KW --    Dressing Care dressing changed  -MD dressing reinforced  -KW dressing changed  -MD    Retired Wound - Properties Group Placement Date: 02/23/23 -SH Placement Time: 1437 -SH Present on Hospital Admission: Y  -SH Orientation: medial  -SH Location: coccyx  -SH Primary Wound Type: Pressure inj  -SH Additional Comments: stage 2  -SH    Retired Wound - Properties Group Date first assessed: 02/23/23 -SH Time first assessed: 1437 -SH Present on Hospital Admission: Y  -SH Location: coccyx  -SH Primary Wound Type: Pressure inj  -SH Additional Comments: stage 2  -SH       Wound 02/23/23 1712 Right posterior heel Pressure Injury    Wound - Properties Group Placement Date: 02/23/23 -BS Placement Time: 1712 -BS Present on Hospital Admission: Y  -BS Side: Right  -BS Orientation: posterior  -BS Location: heel  -BS Primary Wound Type: Pressure inj  -BS    Pressure Injury Stage -- DTPI  -KW --    Dressing Appearance -- open to air;no drainage  -KW --    Closure Open to air  -MD Open to air  -KW --    Base maroon/purple  -MD maroon/purple  -KW --    Periwound -- blanchable  -KW --    Drainage Amount -- none  -KW --    Dressing Care open to air  -MD open to air  -KW --    Retired Wound - Properties Group Placement Date: 02/23/23 -BS Placement Time: 1712 -BS Present on Hospital Admission: Y  -BS Side: Right  -BS Orientation: posterior  -BS Location: heel  -BS Primary Wound Type: Pressure  inj  -BS    Retired Wound - Properties Group Date first assessed: 02/23/23 -BS Time first assessed: 1712 -BS Present on Hospital Admission: Y  -BS Side: Right  -BS Location: heel  -BS Primary Wound Type: Pressure inj  -BS       Wound 02/23/23 1715 Left posterior heel    Wound - Properties Group Placement Date: 02/23/23 -BS Placement Time: 1715 -BS Side: Left  -BS Orientation: posterior  -BS Location: heel  -BS    Dressing Appearance -- open to air  -KW --    Closure Open to air  -MD Open to air  -KW --    Base dry;pink  -MD dry;pink  -KW --    Edges -- callused  -KW --    Drainage Amount -- none  -KW --    Dressing Care open to air  -MD open to air  -KW --    Retired Wound - Properties Group Placement Date: 02/23/23 -BS Placement Time: 1715 -BS Side: Left  -BS Orientation: posterior  -BS Location: heel  -BS    Retired Wound - Properties Group Date first assessed: 02/23/23 -BS Time first assessed: 1715 -BS Side: Left  -BS Location: heel  -BS          User Key  (r) = Recorded By, (t) = Taken By, (c) = Cosigned By    Initials Name Provider Type    Nan Platt LPN Licensed Nurse    Yudi Mireles RN Registered Nurse    Shayne Obregon LPN Licensed Nurse    Ledy Saul LPN Licensed Nurse                Assessment & Plan       Brief Patient Summary:  Holger Ibrahim is a 77 y.o. male who presents with hypoglycemia from [facility]        amiodarone, 200 mg, Oral, TID  apixaban, 5 mg, Oral, Daily  atorvastatin, 40 mg, Oral, Nightly  clopidogrel, 75 mg, Oral, Daily  pantoprazole, 40 mg, Oral, Daily  sodium chloride, 10 mL, Intravenous, Q12H  tamsulosin, 0.4 mg, Oral, Daily        Active Hospital Problems:          Active Hospital Problems     Diagnosis     • **Hypoglycemia     • Unstageable pressure ulcer of sacral region (HCC)     • Unstageable pressure ulcer of right heel (HCC)     • Unstageable pressure ulcer of left heel (HCC)        Plan:   Bilateral pleural effusions  pulm consulted      Acute on chronic anemia  Patient transfused on March 2.  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  We will keep on low-dose sliding scale     SHAHRAM  Stage IV kidney disease  - Creatinine 3.48, baseline 1.6  - BUN 57, potassium 4.9, GFR 17.6  - Normally follows [facility]  - Nephrology consulted  - Avoid nephrotoxic medications/dyes if at all possible  -Hyperkalemia on February 24.  Lokelma ordered. K+ normal  - sodium finally 130+ on 3/1  --Bun/cr finally stabilized March 3 and march 4 around 4.0. No labs on 3/5     Iron deficiency anemia  - Hemoglobin 8.3, appears to be chronic  - Occult stool  - Monitor labs     HTN  HLD  - /71  - Lipid panel ordered  - Continue statin and Norvasc     Diastolic dysfunction  Nonischemic cardiomyopathy  Aortic stenosis  Pitting edema  - Sees Dr. Rasheed outpatient  - Recently at [facility] and underwent a cardiac cath, was deemed too frail to undergo a TAVR at that time, especially with progressive renal insufficiency, follows heart failure clinic and they are still proceeding with work-up for possible TAVR in the near future.  - According to care everywhere records last 2D echo 2/1/23 showed EF 33% with moderate to severe aortic stenosis  - Most recently admitted to [facility] in January due to anasarca  - Continue home diuretics, may follow nephrology recommendations  - EKG ordered and shows normal sinus rhythm  - Check proBNP  - Palliative team consulted     Urinary retention  - Has chronic Diaz catheter to bedside drainage, present on admission  - According to care everywhere records was followed by urology at recent  admission to [facility]  - Continue home Flomax  - Check a U/A  -urology consulted for phimosis 2/27. Pending cysto     Proximal A-fib  - EKG showed NSR  - Continue home amiodarone and Eliquis     DVT prophylaxis  -Continue home Eliquis     COPD  Pulmonary nodule: 13mm RLL with an internal solid 3mm nodule, and a 6mm RLL  Recent COVID infection requiring hospitalization at [facility] January 2023  Current smoker  - Patient had a hospital follow up with pulmonologist, Dr. Cobian 1/26/2023 and was sent to ER for admission due to anasarca  - Pulmonary note reviewed in care everywhere  - Currently on room air and denies shortness of breath  - Smoking cessation education     Right leg claudication s/p popliteal artery angioplasty 2019     D10 drip stopped monitor glucose        Return to [facility] once sodium stable and nephro clears     CODE STATUS: DNR  Lengthy discussion had with patient on CODE STATUS.  He is alert oriented x3.     Admission Status:  I believe this patient meets inpatient status.     I discussed the patient's findings and my recommendations with patient.     03/05/23      Electronically signed by Segun Rodrigues MD, 03/05/23, 11:19 EST.  [facility]ist [facility]

## 2023-03-05 NOTE — PLAN OF CARE
Goal Outcome Evaluation:  Patient is pleasant. No complaints throughout the shift. Q2 turn- s2 coccyx; mepilex applied. No hypoglycemic episodes today. Will return to [facility] at D/C. Will continue to monitor.

## 2023-03-05 NOTE — PROGRESS NOTES
11:20 am still no am labs despite orders for am labs.   Pt with renal failure and recurrent anemia requiring transfusion

## 2023-03-05 NOTE — PROGRESS NOTES
"   LOS: 8 days    Patient Care Team:  Rachel Mckeon MD as PCP - General (Internal Medicine)  Kym Aparicio MD as Consulting Physician (Endocrinology)    Chief Complaint:    Chief Complaint   Patient presents with   • Seizures     Follow UP SHAHRAM  Subjective     Interval History:     Lying in bed, he ate all of his breakfast.    Review of Systems:   As noted above, no soa no cp no nausea    Objective     Vital Signs  Temp:  [96.8 °F (36 °C)-97.7 °F (36.5 °C)] 97.4 °F (36.3 °C)  Heart Rate:  [63-76] 68  Resp:  [12-20] 16  BP: ()/(56-75) 95/56    Flowsheet Rows    Flowsheet Row First Filed Value   Admission Height 177.8 cm (70\") Documented at 02/23/2023 0431   Admission Weight 70 kg (154 lb 6.4 oz) Documented at 02/23/2023 0445          I/O this shift:  In: 240 [P.O.:240]  Out: -   I/O last 3 completed shifts:  In: 3376 [P.O.:2326; I.V.:1000; IV Piggyback:50]  Out: 975 [Urine:975]    Intake/Output Summary (Last 24 hours) at 3/5/2023 1231  Last data filed at 3/5/2023 1135  Gross per 24 hour   Intake 716 ml   Output 550 ml   Net 166 ml       Physical Exam:  General Appearance: alert, oriented x 3, no acute distress, thin and weak appearing.  Skin: warm and dry  HEENT: pupils round and reactive to light, oral mucosa normal, nonicteric sclera.  Partially edentulous  Neck: supple, no JVD, trachea midline  Lungs: CTA, unlabored breathing effort  Heart: RRR, normal S1 and S2, no S3, no rub  Abdomen: soft, nontender, normoactive bowels  : no palpable bladder,  Extremities: 1+ edema, cyanosis or clubbing  Neuro: normal speech and mental status       Results Review:    Results from last 7 days   Lab Units 03/04/23  0618 03/03/23  1015 03/02/23  0438   SODIUM mmol/L 132* 130* 130*   POTASSIUM mmol/L 4.6 4.4 4.5   CHLORIDE mmol/L 96* 93* 94*   CO2 mmol/L 23.0 22.0 23.0   BUN mg/dL 80* 79* 71*   CREATININE mg/dL 4.06* 4.05* 4.18*   CALCIUM mg/dL 8.3* 8.2* 8.1*   BILIRUBIN mg/dL 0.2 0.3 <0.2   ALK PHOS U/L 114 112 107 "   ALT (SGPT) U/L 18 19 18   AST (SGOT) U/L 33 25 23   GLUCOSE mg/dL 124* 172* 211*       Estimated Creatinine Clearance: 16.3 mL/min (A) (by C-G formula based on SCr of 4.06 mg/dL (H)).    Results from last 7 days   Lab Units 03/04/23  0618 03/03/23  1015 03/02/23  0438   MAGNESIUM mg/dL 1.6 1.6 1.7   PHOSPHORUS mg/dL 6.5* 6.0* 5.7*             Results from last 7 days   Lab Units 03/04/23  0618 03/03/23  1015 03/02/23  0438 03/01/23  1023 02/28/23  1601   WBC 10*3/mm3 4.80 4.00 3.20* 3.30* 3.60   HEMOGLOBIN g/dL 9.1* 9.3* 7.3* 8.0* 8.7*   PLATELETS 10*3/mm3 344 351 323 338 336               Imaging Results (Last 24 Hours)     Procedure Component Value Units Date/Time    XR Chest PA & Lateral [[id number]] Collected: 03/04/23 1704     Updated: 03/04/23 1708    Narrative:      XR CHEST PA AND LATERAL    Date of Exam: 3/4/2023 1:19 PM EST    Indication: pleural effusions.    Comparison: February 25, 2023    Findings:  There is a mild pulmonary edema pattern. There are moderate right and small left pleural effusions. There are bibasilar opacities. The heart and mediastinal contours appear stable. The osseous structures appear intact.      Impression:      Impression:  1.Mild pulmonary edema pattern.  2.Moderate right and small left pleural effusions.  3.Bibasilar opacities, which could reflect atelectasis or pneumonia.    Electronically Signed: Raad Chavez    3/4/2023 5:06 PM EST    Workstation ID: [id number]        amiodarone, 200 mg, Oral, TID  amoxicillin-clavulanate, 1 tablet, Oral, Q12H  apixaban, 5 mg, Oral, Daily  atorvastatin, 40 mg, Oral, Nightly  bumetanide, 1 mg, Intravenous, Q12H  clopidogrel, 75 mg, Oral, Daily  folic acid, 1 mg, Oral, Daily  insulin lispro, 2-7 Units, Subcutaneous, TID With Meals  pantoprazole, 40 mg, Oral, Daily  sodium chloride, 10 mL, Intravenous, Q12H  tamsulosin, 0.4 mg, Oral, Daily  thiamine, 100 mg, Oral, Daily           Medication Review:   Current Facility-Administered  Medications   Medication Dose Route Frequency Provider Last Rate Last Admin   • acetaminophen (TYLENOL) tablet 650 mg  650 mg Oral Q4H PRN Angie Dickey APRN        Or   • acetaminophen (TYLENOL) 160 MG/5ML solution 650 mg  650 mg Oral Q4H PRN Angie Dickey APRN        Or   • acetaminophen (TYLENOL) suppository 650 mg  650 mg Rectal Q4H PRN Angie Dickey APRN       • amiodarone (PACERONE) tablet 200 mg  200 mg Oral TID Angie Dickey APRN   200 mg at 03/05/23 0811   • amoxicillin-clavulanate (AUGMENTIN) 875-125 MG per tablet 1 tablet  1 tablet Oral Q12H Chao Mayo MD       • apixaban (ELIQUIS) tablet 5 mg  5 mg Oral Daily Angie Dickey APRN   5 mg at 03/05/23 0811   • atorvastatin (LIPITOR) tablet 40 mg  40 mg Oral Nightly Angie Dickey APRN   40 mg at 03/04/23 2047   • bumetanide (BUMEX) injection 1 mg  1 mg Intravenous Q12H Lucia Lua MD   1 mg at 03/05/23 1151   • clopidogrel (PLAVIX) tablet 75 mg  75 mg Oral Daily Angie Dickey APRN   75 mg at 03/05/23 0811   • dextrose (D50W) (25 g/50 mL) IV injection 25 g  25 g Intravenous Q15 Min PRN Angie Dickey APRN   25 g at 03/04/23 0556   • dextrose (D50W) (25 g/50 mL) IV injection 50 mL  50 mL Intravenous Q1H PRN Jasper Alva MD   50 mL at 03/04/23 0228   • dextrose (GLUTOSE) oral gel 15 g  15 g Oral Q15 Min PRN Angie Dickey APRN   15 g at 02/25/23 1231   • folic acid (FOLVITE) tablet 1 mg  1 mg Oral Daily Chao Mayo MD       • glucagon (human recombinant) (GLUCAGEN DIAGNOSTIC) 1 mg in sterile water (preservative free) 1 mL injection  1 mg Subcutaneous Q15 Min PRN Angie Dickey APRN       • insulin lispro (ADMELOG) injection 2-7 Units  2-7 Units Subcutaneous TID With Meals Broadstone, Vasti, MD   3 Units at 03/05/23 1151   • melatonin tablet 5 mg  5 mg Oral Nightly PRN Angie Dickey APRN       • nitroglycerin (NITROSTAT) SL tablet 0.4 mg  0.4 mg Sublingual Q5 Min PRN Angie Dickey,  APRN       • ondansetron (ZOFRAN) injection 4 mg  4 mg Intravenous Q6H PRN Angie Dickey APRN       • pantoprazole (PROTONIX) EC tablet 40 mg  40 mg Oral Daily Angie Dickey APRN   40 mg at 03/05/23 0811   • sodium chloride 0.9 % flush 10 mL  10 mL Intravenous PRN Jasper Alva MD       • sodium chloride 0.9 % flush 10 mL  10 mL Intravenous Q12H Angie Dickey APRN   10 mL at 03/05/23 0812   • sodium chloride 0.9 % flush 10 mL  10 mL Intravenous PRN Angie Dickey APRN       • sodium chloride 0.9 % infusion 40 mL  40 mL Intravenous PRN Angie Dickey APRN       • tamsulosin (FLOMAX) 24 hr capsule 0.4 mg  0.4 mg Oral Daily Angie Dickey APRN   0.4 mg at 03/05/23 0812   • thiamine (VITAMIN B-1) tablet 100 mg  100 mg Oral Daily Chao Mayo MD           Assessment & Plan         Hypoglycemia    Unstageable pressure ulcer of sacral region (HCC)    Unstageable pressure ulcer of right heel (HCC)    Unstageable pressure ulcer of left heel (HCC)    1. Acute kidney injury initially thought to be secondary to cardiorenal syndrome and diabetic nephropathy.  Versus progression of the underlying condition.  Creatinine has been rising progressively for the last couple years with a risk of progression requiring hemodialysis in the future.   2. Chronic kidney disease stage III creatinine baseline around 1.6-1.8 mg/dL  3. Obstructive uropathy secondary to benign prostatic hypertrophy on tamsulosin.  Followed by urology  4.  Acute on chronic systolic congestive failure.  Ejection fraction of 33% with aortic stenosis..  Currently on atorvastatin, bumetanide, clopidogrel  5.  Hypervolemic hyponatremia .  Sodium slowly improving with diuresis  6. History of hypertension with CKD.  Blood pressure running low at this time.  Off antihypertensives  7. History of diabetes mellitus with CKD over 30 years with extensive complication including retinopathy, nephropathy   8. History of aortic stenosis.  Due to  his severe deconditioning and poor performance status patient not a candidate for TAVR  9.  Chronic normocytic anemia.  Etiology multifactorial nutritional, chronic illness and CKD following H&H closely  10.  Sacral ulcers after primary team followed by wound care  11.  Chronic atrial fibrillation rate controlled on amiodarone and chronic anticoagulation on apixaban  12.  Malnutrition followed closely by dietary.  Albumin 2.9  13.  Hyperphosphatemia secondary to chronic kidney disease,  I will hold off binders for now, and will not limit his dietary intake due to his malnutrition.  Will follow trend for now  14. Frailty     PLAN:  -Follow renal function closely, if patient at risk of requiring hemodialysis in the future due to progressive renal dysfunction and longstanding diabetes   -Volume status improving electrolytes stable  -Avoid nephrotoxins  -Adjust medications to GFR          Rufina Montes MD  03/05/23  12:31 EST

## 2023-03-05 NOTE — PLAN OF CARE
Goal Outcome Evaluation:  Plan of Care Reviewed With: patient        Progress: no change  Outcome Evaluation: pt resting in bed with no complaints,  Pt remains a q2turn, f/c care provided.  Will continue with current orders care plans and monitoring

## 2023-03-05 NOTE — PROGRESS NOTES
"Subjective   Holger Ibrahim is a 77 y.o. male.   Seen for diabetes f/u.  Feeling better.      Objective     /76   Pulse 69   Temp 97.5 °F (36.4 °C) (Oral)   Resp 16   Ht 177.8 cm (70\")   Wt 73.1 kg (161 lb 2.5 oz)   SpO2 100%   BMI 23.12 kg/m²   Blood sugar  211 this am,  208 @ lunch, 105 @ supper    ASSESSMENT    Patient is stable    PLAN    Continue low dose lispro sliding scale.         Kym Aparicio MD  3/5/2023  17:23 EST    "

## 2023-03-06 NOTE — PROGRESS NOTES
Daily Progress Note          Assessment    2 groundglass opacities in the left upper lobe  Differential diagnosis pneumonia versus malignancy  Alcoholism  Diabetes mellitus  Hyperlipidemia  Hypertension        Recommendations:     Oxygenating well on room air  Antibiotics start p.o. Augmentin for possible aspiration  Repeat CT scan of the chest without contrast in 6 weeks     Thiamine and folate  Currently on Plavix and Eliquis  Amiodarone  Bumex 1 mg every 12                        LOS: 9 days     Subjective     Patient reports mild cough    Objective     Vital signs for last 24 hours:  Vitals:    03/06/23 0403 03/06/23 0700 03/06/23 1132 03/06/23 1747   BP: 112/77 99/59 99/58 107/65   BP Location: Right arm Right arm Right arm    Patient Position: Lying Lying Sitting    Pulse: 72 68 70 69   Resp: 11 11 11    Temp: 97.7 °F (36.5 °C)      TempSrc: Temporal      SpO2: 94% 100% 100%    Weight:       Height:           Intake/Output last 3 shifts:  I/O last 3 completed shifts:  In: 956 [P.O.:956]  Out: 1200 [Urine:1200]  Intake/Output this shift:  I/O this shift:  In: 240 [P.O.:240]  Out: 700 [Urine:700]      Radiology  Imaging Results (Last 24 Hours)     ** No results found for the last 24 hours. **          Labs:  Results from last 7 days   Lab Units 03/05/23  2247   WBC 10*3/mm3 4.50   HEMOGLOBIN g/dL 9.3*   HEMATOCRIT % 27.7*   PLATELETS 10*3/mm3 344     Results from last 7 days   Lab Units 03/05/23 2247   SODIUM mmol/L 129*   POTASSIUM mmol/L 5.2   CHLORIDE mmol/L 93*   CO2 mmol/L 20.0*   BUN mg/dL 86*   CREATININE mg/dL 4.74*   CALCIUM mg/dL 8.2*   BILIRUBIN mg/dL 0.2   ALK PHOS U/L 111   ALT (SGPT) U/L 24   AST (SGOT) U/L 35   GLUCOSE mg/dL 115*         Results from last 7 days   Lab Units 03/05/23  2247 03/05/23  1241 03/04/23  0618   ALBUMIN g/dL 2.6* 2.9* 2.9*             Results from last 7 days   Lab Units 03/05/23 2247   MAGNESIUM mg/dL 1.7                   Meds:   SCHEDULE  amiodarone, 200 mg, Oral,  TID  amoxicillin-clavulanate, 1 tablet, Oral, Q12H  apixaban, 5 mg, Oral, Daily  atorvastatin, 40 mg, Oral, Nightly  bumetanide, 1 mg, Intravenous, Q12H  clopidogrel, 75 mg, Oral, Daily  folic acid, 1 mg, Oral, Daily  insulin lispro, 2-7 Units, Subcutaneous, TID With Meals  midodrine, 5 mg, Oral, TID AC  pantoprazole, 40 mg, Oral, Daily  sodium chloride, 10 mL, Intravenous, Q12H  tamsulosin, 0.4 mg, Oral, Daily  thiamine, 100 mg, Oral, Daily      Infusions     PRNs  •  acetaminophen **OR** acetaminophen **OR** acetaminophen  •  dextrose  •  dextrose  •  dextrose  •  glucagon (human recombinant)  •  melatonin  •  nitroglycerin  •  ondansetron  •  [COMPLETED] Insert Peripheral IV **AND** sodium chloride  •  sodium chloride  •  sodium chloride    Physical Exam:  General Appearance:  Alert   HEENT:  Normocephalic, without obvious abnormality, Conjunctiva/corneas clear,.   Nares normal, no drainage     Neck:  Supple, symmetrical, trachea midline.   Lungs /Chest wall: Mild bilateral basal rhonchi, respirations unlabored, symmetrical wall movement.     Heart:  Regular rate and rhythm, S1 S2 normal  Abdomen: Soft, non-tender, no masses, no organomegaly.    Extremities: No edema, no clubbing or cyanosis     ROS  Constitutional: Negative for chills, fever and positive for malaise/fatigue.   HENT: Negative.    Eyes: Negative.    Cardiovascular: Negative.    Respiratory: Positive for cough and shortness of breath.    Skin: Negative.    Musculoskeletal: Negative.    Gastrointestinal: Negative.    Genitourinary: Negative.    Neurological: Negative.    Psychiatric/Behavioral: Negative.      I reviewed the recent clinical results    Much of this encounter note is an electronic transcription/translation of spoken language to printed text using Dragon Software which might include inadvertent errors in transcription.

## 2023-03-06 NOTE — CASE MANAGEMENT/SOCIAL WORK
Continued Stay Note  [facility] Calderon     Patient Name: Holger Ibrahim  MRN: [id number]  Today's Date: 3/6/2023    Admit Date: 2/23/2023    Plan: Return to [facility]. No precert required. Watch for new HD needs.   Discharge Plan     Row Name 03/06/23 1631       Plan    Plan Return to [facility]. No precert required. Watch for new HD needs.    Patient/Family in Agreement with Plan yes    Plan Comments DC barriers: Neph, Pulm, and Endo following. Still watching for initiation of hemodialysis per renal.              Phone communication or documentation only - no physical contact with patient or family.      Megan Naegele, RN      Office Phone: [phone]  Office Cell: [phone]

## 2023-03-06 NOTE — ACP (ADVANCE CARE PLANNING)
Social Work Assessment  [facility]     Patient Name: Holger Ibrahim  MRN: [id number]  Today's Date: 3/6/2023    Admit Date: 2/23/2023       Discharge Plan     Row Name 03/06/23 1638       Plan    Plan Comments LSW met with patient at bedside and completed Advanced Directives. Patient listed his daughter-in-law Manasa Gonzales as his primary health care surrogate and his son as his secondary HCS. Original copy given to patient, 2 copies for each HCS also given to patient, a copy placed in patient's chart on the floor, and a copy faxed to [facility].                    Functional Status     Row Name 03/06/23 1634       Mental Status Summary    Recent Changes in Mental Status/Cognitive Functioning decision-making/judgment    Mental Status Comments LSW consulted for ACP. Patient agreeable to completing Advanced Directives and wants his daughter-in-law Manasa Gonzales and his son Gerard Gonzales as his health care surrogates. LSW called patient's daughter-in-lawManasa to confirm she is still wanting to be his HCS. Completed Advanced Directives and gave patient the original copy, 2 extra copies for each HCS, put a copy in his chart on the floor, and faxed a form to [facility].                Met with patient in room wearing PPE: mask.    Maintained distance greater than six feet and spent less than 15 minutes in the room.      INGA Diaz, KENROYW    Phone: [phone]  Cell: [phone]  Fax: [phone]  [email or web address]

## 2023-03-06 NOTE — PLAN OF CARE
Goal Outcome Evaluation:              Outcome Evaluation: Pt remains without issues. q4 blood sugar checks continues. F/c continues.  Will continue to monitor.

## 2023-03-06 NOTE — PLAN OF CARE
Goal Outcome Evaluation:   Patient has not required any pain medication during shift. Patient is resting in bed during shift, encouraged and turned.

## 2023-03-06 NOTE — PROGRESS NOTES
[facility] Progress Note      Patient Name: Holger Ibrahim  : 1945  MRN: [id number]  Primary Care Physician:  Rachel Mckeon MD  Date of admission: 2023    Subjective     Interval History:     Patient resting comfortably.  Feeling tired but otherwise denies any chest pain, shortness of breath, nausea or vomiting.  Scrotal edema improving slowly    Objective     Vitals:   Temp:  [97.4 °F (36.3 °C)-98 °F (36.7 °C)] 97.7 °F (36.5 °C)  Heart Rate:  [68-72] 68  Resp:  [11-16] 11  BP: ()/(56-77) 99/59  Flow (L/min):  [1] 1    Intake/Output Summary (Last 24 hours) at 3/6/2023 0901  Last data filed at 3/6/2023 0403  Gross per 24 hour   Intake 480 ml   Output 650 ml   Net -170 ml       Physical Exam:    General Appearance: NAD  HEENT: oral mucosa normal, nonicteric sclera  Neck: supple, no JVD  Lungs: CTA  Heart: RRR, normal S1 and S2  Abdomen: soft, nondistended  Extremities: 1+ edema bilateral lower extremities  Neuro: Awake alert and moving all extremities    Scheduled Meds:     amiodarone, 200 mg, Oral, TID  amoxicillin-clavulanate, 1 tablet, Oral, Q12H  apixaban, 5 mg, Oral, Daily  atorvastatin, 40 mg, Oral, Nightly  bumetanide, 1 mg, Intravenous, Q12H  clopidogrel, 75 mg, Oral, Daily  folic acid, 1 mg, Oral, Daily  insulin lispro, 2-7 Units, Subcutaneous, TID With Meals  pantoprazole, 40 mg, Oral, Daily  sodium chloride, 10 mL, Intravenous, Q12H  tamsulosin, 0.4 mg, Oral, Daily  thiamine, 100 mg, Oral, Daily      IV Meds:        Results Reviewed:   I have personally reviewed the results from the time of this admission to 3/6/2023 09:01 EST     Results from last 7 days   Lab Units 23  2247 23  1241 23  0618   SODIUM mmol/L 129* 131* 132*   POTASSIUM mmol/L 5.2 5.0 4.6   CHLORIDE mmol/L 93* 97* 96*   CO2 mmol/L 20.0* 22.0 23.0   BUN mg/dL 86* 81* 80*   CREATININE mg/dL 4.74* 4.27* 4.06*   CALCIUM mg/dL 8.2* 8.0* 8.3*   BILIRUBIN mg/dL 0.2 0.2 0.2   ALK  PHOS U/L 111 113 114   ALT (SGPT) U/L 24 21 18   AST (SGOT) U/L 35 28 33   GLUCOSE mg/dL 115* 185* 124*     Estimated Creatinine Clearance: 13.9 mL/min (A) (by C-G formula based on SCr of 4.74 mg/dL (H)).  Results from last 7 days   Lab Units 03/05/23  2247 03/05/23  1241 03/04/23  0618   MAGNESIUM mg/dL 1.7 1.6 1.6   PHOSPHORUS mg/dL 6.9* 6.5* 6.5*         Results from last 7 days   Lab Units 03/05/23  2247 03/05/23  1241 03/04/23  0618 03/03/23  1015 03/02/23  0438   WBC 10*3/mm3 4.50 4.30 4.80 4.00 3.20*   HEMOGLOBIN g/dL 9.3* 9.3* 9.1* 9.3* 7.3*   PLATELETS 10*3/mm3 344 344 344 351 323           Assessment / Plan     ASSESSMENT:  1. Acute kidney injury initially thought to be secondary to cardiorenal syndrome Or progression of chronic any disease.  Azotemia worsening with diuresis but volume status improving.  2. Chronic kidney disease stage III creatinine baseline around 1.6-1.8 mg/dL  3. Obstructive uropathy. Diaz catheter in place  4. Volume overload.  Improving with diuresis  5. Hyponatremia likely due to hypovolemia.  Stable  6. History of hypertension with CKD.  Blood pressure running low at this time.  Off antihypertensives  7. History of diabetes mellitus with CKD  8. History of aortic stenosis  9.  Anemia.Hemoglobin 9.3     PLAN:  Patient's renal function is worsening with diuresis with volume status improving.  Discussed in detail with the patient and advised to start dialysis To maintain volume status but he is hesitant at this time but will think about it and decide by tomorrow morning  Continue gentle diuresis for now  Start low-dose midodrine      Emanuel Beauchamp MD  03/06/23  09:01 [facility]    [facility]""  [phone]

## 2023-03-06 NOTE — PROGRESS NOTES
[facility] Medicine Services Daily Progress Note    Patient Name: Holger Ibrahim  : 1945  MRN: [id number]  Primary Care Physician:  Rachel Mckeon MD  Date of admission: 2023      Subjective      Chief Complaint: Hypoglycemia from [facility]     Patient seen and examined this morning.  States he is doing okay, denies any particular complaints at this time.  Shortness of breath is stable.  No chest pain, fever, chills, or cough.    Pertinent positives as noted in HPI/subjective.  All other systems were reviewed and are negative.      Objective      Vitals:   Temp:  [97.5 °F (36.4 °C)-98 °F (36.7 °C)] 97.7 °F (36.5 °C)  Heart Rate:  [68-72] 70  Resp:  [11-16] 11  BP: ()/(58-77) 99/58      General: Awake, alert, NAD  Eyes: PERRL, EOMI, conjunctivae are clear  Cardiovascular: Regular rate and rhythm, no murmurs  Respiratory: Clear to auscultation bilaterally, no wheezing or rales, unlabored breathing  Abdomen: Soft, nontender, positive bowel sounds, no guarding  Neurologic: A&O, CN grossly intact, moves all extremities spontaneously  Musculoskeletal: Generalized weakness, no deformities  Skin: Warm, multiple chronic pressure injuries noted         Result Review    Result Review:  I have personally reviewed the results from the time of this admission to 3/6/2023 11:59 EST and agree with these findings:  [x]  Laboratory  [x]  Microbiology  [x]  Radiology  [x]  EKG/Telemetry   [x]  Cardiology/Vascular   []  Pathology  [x]  Old records  []  Other:    Wounds (last 24 hours)     LDA Wound     Row Name 23 0853 23 194          Wound 23 1437 medial coccyx Pressure Injury    Wound - Properties Group Placement Date: 23  -SH Placement Time:   - Present on Hospital Admission: Y  -SH Orientation: medial  -SH Location: coccyx  -SH Primary Wound Type: Pressure inj  -SH Additional Comments: stage 2  -SH    Dressing Appearance -- dry;intact  -JM     Closure VENITA   -MM VENITA  -     Base -- dressing in place, unable to visualize  -     Retired Wound - Properties Group Placement Date: 02/23/23  -SH Placement Time: 1437  -SH Present on Hospital Admission: Y  -SH Orientation: medial  -SH Location: coccyx  -SH Primary Wound Type: Pressure inj  -SH Additional Comments: stage 2  -SH    Retired Wound - Properties Group Date first assessed: 02/23/23  -SH Time first assessed: 1437  -SH Present on Hospital Admission: Y  -SH Location: coccyx  -SH Primary Wound Type: Pressure inj  -SH Additional Comments: stage 2  -SH       Wound 02/23/23 1712 Right posterior heel Pressure Injury    Wound - Properties Group Placement Date: 02/23/23  -BS Placement Time: 1712  -BS Present on Hospital Admission: Y  -BS Side: Right  -BS Orientation: posterior  -BS Location: heel  -BS Primary Wound Type: Pressure inj  -BS    Dressing Appearance -- open to air;dry;intact;no drainage  -     Closure -- Open to air  -     Base -- maroon/purple  -JM     Retired Wound - Properties Group Placement Date: 02/23/23  -BS Placement Time: 1712  -BS Present on Hospital Admission: Y  -BS Side: Right  -BS Orientation: posterior  -BS Location: heel  -BS Primary Wound Type: Pressure inj  -BS    Retired Wound - Properties Group Date first assessed: 02/23/23  -BS Time first assessed: 1712  -BS Present on Hospital Admission: Y  -BS Side: Right  -BS Location: heel  -BS Primary Wound Type: Pressure inj  -BS       Wound 02/23/23 1715 Left posterior heel    Wound - Properties Group Placement Date: 02/23/23  -BS Placement Time: 1715  -BS Side: Left  -BS Orientation: posterior  -BS Location: heel  -BS    Dressing Appearance -- open to air;dry;intact;no drainage  -     Closure -- Open to air  -     Base -- dry;pink  -JM     Retired Wound - Properties Group Placement Date: 02/23/23  -BS Placement Time: 1715 -BS Side: Left  -BS Orientation: posterior  -BS Location: heel  -BS    Retired Wound - Properties Group Date first  assessed: 02/23/23  -BS Time first assessed: 1715  -BS Side: Left  -BS Location: heel  -BS          User Key  (r) = Recorded By, (t) = Taken By, (c) = Cosigned By    Initials Name Provider Type    Yudi Mireles, RN Registered Nurse    Cary Giordano LPN Licensed Nurse    Lore Urbano LPN Licensed Nurse    Ledy Saul LPN Licensed Nurse                Assessment & Plan       Brief Patient Summary:  Holger Ibrahim is a 77 y.o. male who presents with hypoglycemia from [facility]        amiodarone, 200 mg, Oral, TID  apixaban, 5 mg, Oral, Daily  atorvastatin, 40 mg, Oral, Nightly  clopidogrel, 75 mg, Oral, Daily  pantoprazole, 40 mg, Oral, Daily  sodium chloride, 10 mL, Intravenous, Q12H  tamsulosin, 0.4 mg, Oral, Daily        Active Hospital Problems:          Active Hospital Problems     Diagnosis     • **Hypoglycemia     • Unstageable pressure ulcer of sacral region (HCC)     • Unstageable pressure ulcer of right heel (HCC)     • Unstageable pressure ulcer of left heel (HCC)        Plan:     SHAHRAM on CKD stage IIIb, worsening  Bilateral pleural effusions with possible left upper lobe pneumonia  Acute on chronic HFrEF  Severe aortic stenosis  NICM  -Imaging reports noted  -Recently at [facility] and underwent a cardiac cath, was deemed too frail to undergo a TAVR at that time, especially with progressive renal insufficiency, follows heart failure clinic and they are still proceeding with work-up for possible TAVR in the near future.  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 Pending cysto  -Continue Diaz, monitor     Proximal A-fib  -Rate controlled  -Continue amiodarone and Eliquis     COPD  Pulmonary nodule: 13mm RLL with an internal solid 3mm nodule, and a 6mm RLL  Recent COVID infection requiring hospitalization at [facility] January 2023  Current smoker  -Patient had a hospital follow up with pulmonologist, Dr. Cobian 1/26/2023 and was sent to ER for admission due to anasarca  -Pulmonology following and managing  -Continue outpatient follow-up     Right leg claudication s/p popliteal artery angioplasty 2019      Return to [facility] once sodium stable and nephro clears     CODE STATUS: DNR  Lengthy discussion had with patient on CODE STATUS.  He is alert oriented x3.     Admission Status:  I believe this patient meets inpatient status.     I discussed the patient's findings and my recommendations with patient.     03/06/23      Electronically signed by Britney Joiner DO, 03/06/23, 11:59 EST.  [facility] Hospitalist Team

## 2023-03-06 NOTE — THERAPY TREATMENT NOTE
"Subjective: Pt agreeable to therapeutic plan of care.    Objective:     Bed mobility - Min-A supine>sit with HOB elevated, mod A to scoot to EOB to prevent shearing of coccyx wound  Transfers - Min-A and with rolling walker  Ambulation - 40 feet x2 CGA and with rolling walker    Vitals: WNL    Pain: pt does not report any pain  Location: NA  Intervention for pain: N/A    Education: Provided education on the importance of mobility in the acute care setting, Verbal/Tactile Cues, Transfer Training and Gait Training    Assessment: Holger Ibrahim presents with functional mobility impairments which indicate the need for skilled intervention. Pt with good motivation to participate in PT this date. Pt with moderate fwd flexed standing posture and decreased step length, NBOS, and decreased heel strike. V/c to improve step length. Tolerating session today without incident. Will continue to follow and progress as tolerated.     Plan/Recommendations:   Moderate Intensity Therapy recommended post-acute care. This is recommended as therapy feels the patient would require 3-4 days per week and wouldn't tolerate \"3 hour daily\" rehab intensity. SNF would be the preferred choice. If the patient does not agree to SNF, arrange HH or OP depending on home bound status. If patient is medically complex, consider LTACH.. Pt requires no DME at discharge.     Pt desires Skilled Rehab placement at discharge. Pt cooperative; agreeable to therapeutic recommendations and plan of care.         Basic Mobility 6-click:  Rollin = Total, A lot = 2, A little = 3; 4 = None  Supine>Sit:   1 = Total, A lot = 2, A little = 3; 4 = None   Sit>Stand with arms:  1 = Total, A lot = 2, A little = 3; 4 = None  Bed>Chair:   1 = Total, A lot = 2, A little = 3; 4 = None  Ambulate in room:  1 = Total, A lot = 2, A little = 3; 4 = None  3-5 Steps with railin = Total, A lot = 2, A little = 3; 4 = None  Score: 16    Modified Pershing: N/A = No pre-op " stroke/TIA    Post-Tx Position: Up in Chair, Alarms activated and Call light and personal items within reach  PPE: gloves and surgical mask

## 2023-03-06 NOTE — PROGRESS NOTES
Daily Progress Note    Patient Care Team:  Rachel Mckeon MD as PCP - General (Internal Medicine)  Kym Aparicio MD as Consulting Physician (Endocrinology)    Chief Complaint: Follow-up type 2 diabetes    HPI: Patient seen and examined today.  Clinically doing well.  Able to carry on conversation.  Eating fair.  Blood sugar log reviewed.  Very sensitive to insulin and not able to tolerate small dose of Lantus.  Currently on sliding scale.    ROS:   Constitutional:  Denies fatigue, tiredness.    Respiratory: denies cough, shortness of breath.   Cardiovascular:  denies chest pain, edema   GI:  Denies abdominal pain, nausea, vomiting.       Vitals:    03/06/23 1747   BP: 107/65   Pulse: 69   Resp:    Temp:    SpO2:      Body mass index is 23.12 kg/m².    Physical Exam:  GEN: NAD, conversant  PSYCH: Awake and coherent.      Results Review:     I reviewed the patient's new clinical results.    Glucose   Date Value Ref Range Status   03/05/2023 115 (H) 65 - 99 mg/dL Final     Sodium   Date Value Ref Range Status   03/05/2023 129 (L) 136 - 145 mmol/L Final     Potassium   Date Value Ref Range Status   03/05/2023 5.2 3.5 - 5.2 mmol/L Final     CO2   Date Value Ref Range Status   03/05/2023 20.0 (L) 22.0 - 29.0 mmol/L Final     Chloride   Date Value Ref Range Status   03/05/2023 93 (L) 98 - 107 mmol/L Final     Anion Gap   Date Value Ref Range Status   03/05/2023 16.0 (H) 5.0 - 15.0 mmol/L Final     Creatinine   Date Value Ref Range Status   03/05/2023 4.74 (H) 0.76 - 1.27 mg/dL Final     BUN   Date Value Ref Range Status   03/05/2023 86 (H) 8 - 23 mg/dL Final     BUN/Creatinine Ratio   Date Value Ref Range Status   03/05/2023 18.1 7.0 - 25.0 Final     Calcium   Date Value Ref Range Status   03/05/2023 8.2 (L) 8.6 - 10.5 mg/dL Final     Alkaline Phosphatase   Date Value Ref Range Status   03/05/2023 111 39 - 117 U/L Final     Total Protein   Date Value Ref Range Status   03/05/2023 7.0 6.0 - 8.5 g/dL Final      ALT (SGPT)   Date Value Ref Range Status   03/05/2023 24 1 - 41 U/L Final     AST (SGOT)   Date Value Ref Range Status   03/05/2023 35 1 - 40 U/L Final     Total Bilirubin   Date Value Ref Range Status   03/05/2023 0.2 0.0 - 1.2 mg/dL Final     Albumin   Date Value Ref Range Status   03/05/2023 2.6 (L) 3.5 - 5.2 g/dL Final     Globulin   Date Value Ref Range Status   03/05/2023 4.4 gm/dL Final     Magnesium   Date Value Ref Range Status   03/05/2023 1.7 1.6 - 2.4 mg/dL Final     Phosphorus   Date Value Ref Range Status   03/05/2023 6.9 (H) 2.5 - 4.5 mg/dL Final     Lab Results   Component Value Date    HGBA1C 7.4 (H) 02/23/2023    HGBA1C 7.6 (H) 02/02/2023    HGBA1C 10.4 (H) 12/13/2022       Results from last 7 days   Lab Units 03/06/23  1607 03/06/23  1130 03/06/23  0811 03/06/23  0403 03/06/23  0007 03/05/23 2010   GLUCOSE mg/dL 262* 160* 119* 115* 120* 101             Medication Review: Reviewed.     amiodarone, 200 mg, Oral, TID  amoxicillin-clavulanate, 1 tablet, Oral, Q12H  apixaban, 5 mg, Oral, Daily  atorvastatin, 40 mg, Oral, Nightly  bumetanide, 1 mg, Intravenous, Q12H  clopidogrel, 75 mg, Oral, Daily  folic acid, 1 mg, Oral, Daily  insulin lispro, 2-7 Units, Subcutaneous, TID With Meals  midodrine, 5 mg, Oral, TID AC  pantoprazole, 40 mg, Oral, Daily  sodium chloride, 10 mL, Intravenous, Q12H  tamsulosin, 0.4 mg, Oral, Daily  thiamine, 100 mg, Oral, Daily              Assessment and plan:  Diabetes mellitus type 2 with hyperglycemia: Brittle and uncontrolled, currently on Humalog sliding scale.  We will follow blood sugars and make adjustments as needed.    Renal failure: Followed by nephrology    Hyperlipidemia: Currently on atorvastatin.    Mekhi Vidal MD. FACE

## 2023-03-06 NOTE — PLAN OF CARE
Assessment: Holger Ibrahim presents with functional mobility impairments which indicate the need for skilled intervention. Pt with good motivation to participate in PT this date. Pt with moderate fwd flexed standing posture and decreased step length, NBOS, and decreased heel strike. V/c to improve step length. Tolerating session today without incident. Will continue to follow and progress as tolerated.

## 2023-03-07 NOTE — DISCHARGE INSTR - LAB
Patient needs to have BNP drawn 3/10 and 3/13.   Patient needs to have appointment with Dr. Beauchamp in one week.   Dr. Emanuel Beauchamp   [address]   [address]  [place]   [phone]

## 2023-03-07 NOTE — PROGRESS NOTES
"Enter Query Response Below      Query Response:     Severe chronic disease related malnutrition             If applicable, please update the problem list.     Patient: Holger Corley        : 1945  Account: [id number]           Admit Date:         How to Respond to this query:       a. Click New Note     b. Answer query within the yellow box.                c. Update the Problem List, if applicable.      If you have any questions about this query contact me at: [phone]    Dr. Joiner:     77-year-old male admitted with hypoglycemia, and an acute renal injury.  The Registered Dietician's note from 23 includes documentation of \"Severe chronic disease related malnutrition related to inadequate energy intake in the setting of multiple chronic diseases as evidenced by PO intake meeting less than 75% of estimated energy requirement for greater than or equal to 3 months, severe muscle wasting, severe fat wasting, and moderate fluid accumulation\"   The note includes a height of 177.8 cm, weight 69.5 kg, and BMI 21.98.   The Nephrology progress note on 3/4/23 states, \" Malnutrition followed closely by dietary\".  Novasource Renal nutrition supplement ordered twice daily, PO intake monitored during his hospital stay.      After study, can the patient's condition be further clarified as:     Severe chronic disease related malnutrition  Normal body habitus  Other-specify__________________________  Unable to determine       By submitting this query, we are merely seeking further clarification of documentation to accurately reflect all conditions that you are monitoring, evaluating, treating or that extend the hospitalization or utilize additional resources of care. Please utilize your independent clinical judgment when addressing the question(s) above.     This query and your response, once completed, will be entered into the legal medical record.    Sincerely,  Joaquina Chang RN,BSN  " [email or web address]  Clinical Documentation Integrity Program

## 2023-03-07 NOTE — CASE MANAGEMENT/SOCIAL WORK
Continued Stay Note  [facility]     Patient Name: Holger Ibrahim  MRN: [id number]  Today's Date: 3/7/2023    Admit Date: 2/23/2023    Plan: Return to [facility]. No precert required. No HD at this time.   Discharge Plan     Row Name 03/07/23 1220       Plan    Plan Return to [facility]. No precert required. No HD at this time.    Patient/Family in Agreement with Plan yes    Plan Comments CM notified by nursing that pt will need hemodialysis set up at LTC facility. Request for Hep B labs from nursing and updated liaison Yolanda. Later notified by nursing that labs had come back, and Neph is going to hold off on HD at this time with follow up labs to be done at his care facility. CM updated liaison Yolanda and plans for pt to discharge today. SNF van is able to pick pt up at 2 PM in the front of the hospital. Updated unit secretary and nursing. CM contacted patient's daughter in law Manasa and provided update and reviewed IMM letter. IMM copy emailed to Manasa.              Met with patient in room wearing PPE: mask, face shield/goggles.      Maintained distance greater than six feet and spent less than 15 minutes in the room.      Megan Naegele, RN      Office Phone: [phone]  Office Cell: [phone]

## 2023-03-07 NOTE — PROGRESS NOTES
[facility] Progress Note      Patient Name: Holger Ibrahim  : 1945  MRN: [id number]  Primary Care Physician:  Rachel Mckeon MD  Date of admission: 2023    Subjective     Interval History:     Patient resting comfortably.    Scrotal edema improving.  Denies any chest pain, shortness of breath, nausea or vomiting    Objective     Vitals:   Temp:  [97 °F (36.1 °C)-97.4 °F (36.3 °C)] 97 °F (36.1 °C)  Heart Rate:  [68-70] 68  Resp:  [12-16] 16  BP: (103-109)/(48-65) 109/48    Intake/Output Summary (Last 24 hours) at 3/7/2023 1146  Last data filed at 3/7/2023 1019  Gross per 24 hour   Intake 480 ml   Output 700 ml   Net -220 ml       Physical Exam:    General Appearance: NAD  HEENT: oral mucosa normal, nonicteric sclera  Neck: supple, no JVD  Lungs: CTA  Heart: RRR, normal S1 and S2  Abdomen: soft, nondistended  Extremities: 1+ edema bilateral lower extremities  Neuro: Awake alert and moving all extremities    Scheduled Meds:     amiodarone, 200 mg, Oral, TID  amoxicillin-clavulanate, 1 tablet, Oral, Q12H  apixaban, 5 mg, Oral, Daily  atorvastatin, 40 mg, Oral, Nightly  [START ON 3/8/2023] bumetanide, 1 mg, Oral, Daily  clopidogrel, 75 mg, Oral, Daily  folic acid, 1 mg, Oral, Daily  insulin lispro, 2-7 Units, Subcutaneous, TID With Meals  midodrine, 5 mg, Oral, TID AC  pantoprazole, 40 mg, Oral, Daily  sodium chloride, 10 mL, Intravenous, Q12H  tamsulosin, 0.4 mg, Oral, Daily  thiamine, 100 mg, Oral, Daily      IV Meds:        Results Reviewed:   I have personally reviewed the results from the time of this admission to 3/7/2023 11:46 EST     Results from last 7 days   Lab Units 23  0942 23  2247 23  1241 23  0618   SODIUM mmol/L 130* 129* 131* 132*   POTASSIUM mmol/L 4.5 5.2 5.0 4.6   CHLORIDE mmol/L 95* 93* 97* 96*   CO2 mmol/L 21.0* 20.0* 22.0 23.0   BUN mg/dL 94* 86* 81* 80*   CREATININE mg/dL 4.55* 4.74* 4.27* 4.06*   CALCIUM mg/dL 8.0* 8.2* 8.0*  8.3*   BILIRUBIN mg/dL  --  0.2 0.2 0.2   ALK PHOS U/L  --  111 113 114   ALT (SGPT) U/L  --  24 21 18   AST (SGOT) U/L  --  35 28 33   GLUCOSE mg/dL 180* 115* 185* 124*     Estimated Creatinine Clearance: 14.9 mL/min (A) (by C-G formula based on SCr of 4.55 mg/dL (H)).  Results from last 7 days   Lab Units 03/07/23 0942 03/05/23 2247 03/05/23 1241 03/04/23 0618   MAGNESIUM mg/dL  --  1.7 1.6 1.6   PHOSPHORUS mg/dL 7.2* 6.9* 6.5* 6.5*         Results from last 7 days   Lab Units 03/07/23 0942 03/05/23 2247 03/05/23 1241 03/04/23  0618 03/03/23  1015   WBC 10*3/mm3 5.00 4.50 4.30 4.80 4.00   HEMOGLOBIN g/dL 8.6* 9.3* 9.3* 9.1* 9.3*   PLATELETS 10*3/mm3 342 344 344 344 351           Assessment / Plan     ASSESSMENT:  1. Acute kidney injury initially thought to be secondary to cardiorenal syndrome Or progression of chronic any disease.  Azotemia had worsened with diuresis but creatinine little better this morning.  Although BUN still high  2. Chronic kidney disease stage III creatinine baseline around 1.6-1.8 mg/dL  3. Obstructive uropathy. Diaz catheter in place  4. Volume overload.  Improving with diuresis  5. Hyponatremia likely due to hypovolemia.  Stable  6. History of hypertension with CKD.  Blood pressure running low at this time.  Off antihypertensives  7. History of diabetes mellitus with CKD  8. History of aortic stenosis  9.  Anemia.     PLAN:  Creatinine little better.  Discussed with patient in detail and decided to hold off on the dialysis.  Patient will be discharged to rehab but he will need very close follow-up  I would arrange for labs in 3 days and again in next week.  He will need to follow-up in our office next week  I would start Bumex at very low-dose, at 1 mg daily on discharge      Emanuel Beauchamp MD  03/07/23  11:46 EST    [facility]  [phone]

## 2023-03-07 NOTE — PLAN OF CARE
Goal Outcome Evaluation:  Plan of Care Reviewed With: patient        Progress: no change  Outcome Evaluation: Pt rested in bed with no complaints, Pt received 1 Unit of PRBCs tolerated well, pt able to make needs known, VSS. Call light within reach, will continue to monitor

## 2023-03-07 NOTE — CASE MANAGEMENT/SOCIAL WORK
Case Management Discharge Note      Final Note: Alexander Odeboltmilly.    Selected Continued Care - Discharged on 3/7/2023 Admission date: 2/23/2023 - Discharge disposition: Long Term Care (DC - External)    Destination Coordination complete.    Service Provider Selected Services Address Phone Fax Patient Preferred    [facility] [place] [facility] [address] [place] [address] [place] [address] [phone] --       Internal Comment last updated by Naegele, Megan, RN 3/7/2023 1625    Return as skilled per Yolanda                      Transportation Services  W/C Van: Skilled Nursing Facility Van    Final Discharge Disposition Code: 03 - skilled nursing facility (SNF)

## 2023-03-07 NOTE — PROGRESS NOTES
[facility] Medicine Services Daily Progress Note    Patient Name: Holger Ibrahim  : 1945  MRN: [id number]  Primary Care Physician:  Rachel Mckeon MD  Date of admission: 2023      Subjective      Chief Complaint: Hypoglycemia      Patient seen and examined this morning.  Doing okay this morning, agrees for dialysis now.  Nephrology to arrange.  No other complaints.    Pertinent positives as noted in HPI/subjective.  All other systems were reviewed and are negative.      Objective      Vitals:   Temp:  [97 °F (36.1 °C)-97.4 °F (36.3 °C)] 97 °F (36.1 °C)  Heart Rate:  [68-70] 68  Resp:  [11-16] 16  BP: ()/(48-65) 109/48      General: Awake, alert, elderly male, chronically ill-appearing, NAD  Cardiovascular: Regular rate and rhythm, no murmurs  Respiratory: Clear to auscultation bilaterally, no wheezing or rales, unlabored breathing  Abdomen: Soft, nontender, positive bowel sounds, no guarding  Musculoskeletal: Generalized weakness, no deformities  Skin: Warm, multiple chronic pressure injuries noted         Result Review    Result Review:  I have personally reviewed the results from the time of this admission to 3/7/2023 10:23 EST and agree with these findings:  [x]  Laboratory  []  Microbiology  []  Radiology  []  EKG/Telemetry   []  Cardiology/Vascular   []  Pathology  []  Old records  []  Other:    Wounds (last 24 hours)     LDA Wound     Row Name 23 0800 23          Wound 23 1437 medial coccyx Pressure Injury    Wound - Properties Group Placement Date: 23  -SH Placement Time: 1437  -SH Present on Hospital Admission: Y  -SH Orientation: medial  -SH Location: coccyx  -SH Primary Wound Type: Pressure inj  -SH Additional Comments: stage 2  -SH    Pressure Injury Stage 2  -JH --     Dressing Appearance dry;intact  -JH dry;intact  -KF     Closure VENITA  -JH VENITA  -KF     Base white  -JH white  -KF     Retired Wound - Properties Group Placement Date:  02/23/23  -SH Placement Time: 1437 -SH Present on Hospital Admission: Y  -SH Orientation: medial  -SH Location: coccyx  -SH Primary Wound Type: Pressure inj  -SH Additional Comments: stage 2  -SH    Retired Wound - Properties Group Date first assessed: 02/23/23 -SH Time first assessed: 1437 -SH Present on Hospital Admission: Y  -SH Location: coccyx  -SH Primary Wound Type: Pressure inj  -SH Additional Comments: stage 2  -SH       Wound 02/23/23 1712 Right posterior heel Pressure Injury    Wound - Properties Group Placement Date: 02/23/23  -BS Placement Time: 1712 -BS Present on Hospital Admission: Y  -BS Side: Right  -BS Orientation: posterior  -BS Location: heel  -BS Primary Wound Type: Pressure inj  -BS    Pressure Injury Stage DTPI  -JH --     Dressing Appearance open to air  -JH --     Closure Open to air  -JH Open to air  -KF     Base maroon/purple  -JH maroon/purple  -KF     Retired Wound - Properties Group Placement Date: 02/23/23 -BS Placement Time: 1712 -BS Present on Hospital Admission: Y  -BS Side: Right  -BS Orientation: posterior  -BS Location: heel  -BS Primary Wound Type: Pressure inj  -BS    Retired Wound - Properties Group Date first assessed: 02/23/23  -BS Time first assessed: 1712 -BS Present on Hospital Admission: Y  -BS Side: Right  -BS Location: heel  -BS Primary Wound Type: Pressure inj  -BS       Wound 02/23/23 1715 Left posterior heel    Wound - Properties Group Placement Date: 02/23/23 -BS Placement Time: 1715 -BS Side: Left  -BS Orientation: posterior  -BS Location: heel  -BS    Dressing Appearance open to air  -JH --     Closure Adhesive bandage  -JH Adhesive bandage  -KF     Base dry;pink  -JH dry;pink  -KF     Retired Wound - Properties Group Placement Date: 02/23/23  -BS Placement Time: 1715 -BS Side: Left  -BS Orientation: posterior  -BS Location: heel  -BS    Retired Wound - Properties Group Date first assessed: 02/23/23 -BS Time first assessed: 1715 -BS Side: Left  -BS  Location: heel  -BS          User Key  (r) = Recorded By, (t) = Taken By, (c) = Cosigned By    Initials Name Provider Type    Leighann Pedroza LPN Licensed Nurse    Yudi Mireles RN Registered Nurse    Christina Davis RN Registered Nurse    Ledy Saul LPN Licensed Nurse                Assessment & Plan       Brief Patient Summary:  Holger Ibrahim is a 77 y.o. male who presents with hypoglycemia from [facility]        amiodarone, 200 mg, Oral, TID  apixaban, 5 mg, Oral, Daily  atorvastatin, 40 mg, Oral, Nightly  clopidogrel, 75 mg, Oral, Daily  pantoprazole, 40 mg, Oral, Daily  sodium chloride, 10 mL, Intravenous, Q12H  tamsulosin, 0.4 mg, Oral, Daily        Active Hospital Problems:          Active Hospital Problems     Diagnosis     • **Hypoglycemia     • Unstageable pressure ulcer of sacral region (HCC)     • Unstageable pressure ulcer of right heel (HCC)     • Unstageable pressure ulcer of left heel (HCC)        Plan:     SHAHRAM on CKD stage IIIb, worsening  Bilateral pleural effusions with possible left upper lobe pneumonia  Acute on chronic HFrEF  Severe aortic stenosis  NICM  -Imaging reports noted  -Recently at [facility] and underwent a cardiac cath, was deemed too frail to undergo a TAVR at that time, especially with progressive renal insufficiency, follows heart failure clinic and they are still proceeding with work-up for possible TAVR in the near future.  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 Pending cysto  -Continue Diaz, monitor     Proximal A-fib  -Rate controlled  -Continue amiodarone and Eliquis     COPD  Pulmonary nodule: 13mm RLL with an internal solid 3mm nodule, and a 6mm RLL  Recent COVID infection requiring hospitalization at [facility] January 2023  Current smoker  -Patient had a hospital follow up with pulmonologist, Dr. Cobian 1/26/2023 and was sent to ER for admission due to anasarca  -Pulmonology following and managing  -Continue outpatient follow-up     Right leg claudication s/p popliteal artery angioplasty 2019      Return to [facility] once sodium stable and nephro clears     CODE STATUS: DNR  Lengthy discussion had with patient on CODE STATUS.  He is alert oriented x3.     Admission Status:  I believe this patient meets inpatient status.     I discussed the patient's findings and my recommendations with patient.     03/07/23      Electronically signed by Britney Joiner DO, 03/07/23, 10:23 EST.  [facility]

## 2023-03-07 NOTE — PROGRESS NOTES
Daily Progress Note    Patient Care Team:  Rachel Mckeon MD as PCP - General (Internal Medicine)  Kym Aparicio MD as Consulting Physician (Endocrinology)    Chief Complaint: Follow-up type 2 diabetes    HPI: Patient seen and examined today.  Blood sugar log reviewed, blood sugars doing well.  He is eating well.  No complaints at this time.    ROS:   Constitutional:  Denies fatigue, tiredness.    Respiratory: denies cough, shortness of breath.   Cardiovascular:  denies chest pain, edema   GI:  Denies abdominal pain, nausea, vomiting.         Vitals:    03/07/23 1155   BP: 126/88   Pulse: 62   Resp: 14   Temp: 97.4 °F (36.3 °C)   SpO2: 98%     Body mass index is 23.72 kg/m².    Physical Exam:  GEN: NAD, conversant  CV: RRR  LUNG: CTA  PSYCH: Awake and coherent      Results Review:     I reviewed the patient's new clinical results.    Glucose   Date Value Ref Range Status   03/07/2023 180 (H) 65 - 99 mg/dL Final     Sodium   Date Value Ref Range Status   03/07/2023 130 (L) 136 - 145 mmol/L Final     Potassium   Date Value Ref Range Status   03/07/2023 4.5 3.5 - 5.2 mmol/L Final     CO2   Date Value Ref Range Status   03/07/2023 21.0 (L) 22.0 - 29.0 mmol/L Final     Chloride   Date Value Ref Range Status   03/07/2023 95 (L) 98 - 107 mmol/L Final     Anion Gap   Date Value Ref Range Status   03/07/2023 14.0 5.0 - 15.0 mmol/L Final     Creatinine   Date Value Ref Range Status   03/07/2023 4.55 (H) 0.76 - 1.27 mg/dL Final     BUN   Date Value Ref Range Status   03/07/2023 94 (H) 8 - 23 mg/dL Final     BUN/Creatinine Ratio   Date Value Ref Range Status   03/07/2023 20.7 7.0 - 25.0 Final     Calcium   Date Value Ref Range Status   03/07/2023 8.0 (L) 8.6 - 10.5 mg/dL Final     Alkaline Phosphatase   Date Value Ref Range Status   03/05/2023 111 39 - 117 U/L Final     Total Protein   Date Value Ref Range Status   03/05/2023 7.0 6.0 - 8.5 g/dL Final     ALT (SGPT)   Date Value Ref Range Status   03/05/2023 24 1 -  41 U/L Final     AST (SGOT)   Date Value Ref Range Status   03/05/2023 35 1 - 40 U/L Final     Total Bilirubin   Date Value Ref Range Status   03/05/2023 0.2 0.0 - 1.2 mg/dL Final     Albumin   Date Value Ref Range Status   03/07/2023 2.9 (L) 3.5 - 5.2 g/dL Final     Globulin   Date Value Ref Range Status   03/05/2023 4.4 gm/dL Final     Magnesium   Date Value Ref Range Status   03/05/2023 1.7 1.6 - 2.4 mg/dL Final     Phosphorus   Date Value Ref Range Status   03/07/2023 7.2 (H) 2.5 - 4.5 mg/dL Final     Lab Results   Component Value Date    HGBA1C 7.4 (H) 02/23/2023    HGBA1C 7.6 (H) 02/02/2023    HGBA1C 10.4 (H) 12/13/2022       Results from last 7 days   Lab Units 03/07/23  1155 03/07/23  0744 03/07/23  0013 03/06/23  1950 03/06/23  1607 03/06/23  1130   GLUCOSE mg/dL 117* 170* 156* 213* 262* 160*             Medication Review: Reviewed.     amiodarone, 200 mg, Oral, TID  amoxicillin-clavulanate, 1 tablet, Oral, Q12H  apixaban, 5 mg, Oral, Daily  atorvastatin, 40 mg, Oral, Nightly  [START ON 3/8/2023] bumetanide, 1 mg, Oral, Daily  clopidogrel, 75 mg, Oral, Daily  folic acid, 1 mg, Oral, Daily  insulin lispro, 2-7 Units, Subcutaneous, TID With Meals  midodrine, 5 mg, Oral, TID AC  pantoprazole, 40 mg, Oral, Daily  sodium chloride, 10 mL, Intravenous, Q12H  tamsulosin, 0.4 mg, Oral, Daily  thiamine, 100 mg, Oral, Daily              Assessment and plan:  Diabetes mellitus type 2 with hyperglycemia: Brittle and uncontrolled, currently on Humalog sliding scale.  We will follow blood sugars and make adjustments as needed.     Renal failure: Followed by nephrology     Hyperlipidemia: Currently on atorvastatin.    Mekhi Vidal MD. FACE

## 2023-03-07 NOTE — NURSING NOTE
Attempted to call report on patient twice. No answer either time, transferred to unit, no answer either time.

## 2023-03-07 NOTE — DISCHARGE SUMMARY
"         [facility] Medicine Services   DISCHARGE SUMMARY    Patient Name: Holger Ibrahim  : 1945  MRN: [id number]    Date of Admission: 2023  Date of Discharge:  23    Primary Care Physician: Rachel Mckeon MD      Presenting Problem:   Hypoglycemia [E16.2]    Active and Resolved Hospital Problems:  Active Hospital Problems    Diagnosis POA   • **Hypoglycemia [E16.2] Yes   • Unstageable pressure ulcer of sacral region (HCC) [L89.150] Yes   • Unstageable pressure ulcer of right heel (HCC) [L89.610] Yes   • Unstageable pressure ulcer of left heel (HCC) [L89.620] Yes      Resolved Hospital Problems   No resolved problems to display.         Hospital Course     Hospital Course:  Holger Ibrahim is a 77 y.o. male who presented to [facility] on 2023 from [facility] by EMS for hypoglycemia.  EMS reported they were called for \"seizure-like\" activity.  When they arrived patient was found to have a blood sugar of 30.  He was given glucagon IM in route to the hospital.  Blood sugar on arrival here was 58.  Noted to have worsening SHAHRAM on CKD as well as bilateral pleural effusions.  Acute on chronic systolic CHF exacerbation.  Treated with diuresis but creatinine worsening.  Plan to likely start dialysis outpatient per nephrology.  Further details noted below in assessment and plan.  Okay to discharge per all subspecialties.  Patient is stable to discharge back to rehab facility with follow-up with PCP and nephrology as an outpatient.      A/P:    SHAHRAM on CKD stage IIIb, worsening  Bilateral pleural effusions with possible left upper lobe pneumonia  Acute on chronic HFrEF  Severe aortic stenosis  NICM  -Imaging reports noted  -Recently at [facility] and underwent a cardiac cath, was deemed too frail to undergo a TAVR at that time, especially with progressive renal insufficiency, follows heart failure clinic and they are still proceeding with work-up for possible TAVR in the near " future.  -According to care everywhere records last 2D echo 2/1/23 showed EF 33% with moderate to severe aortic stenosis  -Still volume overloaded, creatinine trending up  -Likely needs to be initiated on HD and patient agrees to it on 3/7  -Discussed with nephrology, patient on Eliquis so plan to likely start dialysis outpatient, nephrology with follow-up in the office  -Augmentin added per pulmonology  -Diuresis with Bumex 1 mg daily per nephrology  -Okay to discharge today back to facility     Acute on chronic anemia  -Patient transfused on March 2  -No signs of any acute bleeding  -Likely has underlying chronic anemia from renal disease  -Monitor hemoglobin, transfuse as needed     Hypoglycemia  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 Pending cysto  -Continue Diaz, monitor     Proximal A-fib  -Rate controlled  -Continue amiodarone and Eliquis     COPD  Pulmonary nodule: 13mm RLL with an internal solid 3mm nodule, and a 6mm RLL  Recent COVID infection requiring hospitalization at [facility] [address] [facility] January 2023  Current smoker  -Patient had a hospital follow up with pulmonologist, Dr. Cobian 1/26/2023 and was sent to ER for admission due to anasarca  -Pulmonology following and managing  -Continue outpatient follow-up     Right leg claudication s/p popliteal artery angioplasty 2019       DISCHARGE Follow Up Recommendations for labs and diagnostics:   Follow-up with PCP and nephrology    Reasons For Change In Medications and Indications for New Medications:  As noted below    Day of Discharge     Vital Signs:  Temp:  [97 °F (36.1 °C)-97.4 °F (36.3 °C)] 97 °F (36.1 °C)  Heart Rate:  [68-70] 68  Resp:   [12-16] 16  BP: (103-109)/(48-65) 109/48    Physical Exam:  General: Awake, alert, elderly male, chronically ill-appearing, NAD  Cardiovascular: Regular rate and rhythm, no murmurs  Respiratory: Clear to auscultation bilaterally, no wheezing or rales, unlabored breathing  Abdomen: Soft, nontender, positive bowel sounds, no guarding  Musculoskeletal: Generalized weakness, no deformities  Skin: Warm, multiple chronic pressure injuries noted        Pertinent  and/or Most Recent Results     LAB RESULTS:      Lab 03/07/23  0942 03/05/23  2247 03/05/23  1241 03/04/23  0618 03/03/23  1015   WBC 5.00 4.50 4.30 4.80 4.00   HEMOGLOBIN 8.6* 9.3* 9.3* 9.1* 9.3*   HEMATOCRIT 25.7* 27.7* 28.7* 28.2* 28.9*   PLATELETS 342 344 344 344 351   NEUTROS ABS 4.40 3.90 3.70 4.10 3.30   LYMPHS ABS 0.30* 0.30* 0.20* 0.30* 0.30*   MONOS ABS 0.20 0.30 0.20 0.40 0.30   EOS ABS 0.00 0.00 0.00 0.00 0.00   MCV 88.8 90.0 92.1 89.5 91.7         Lab 03/07/23  0942 03/05/23 2247 03/05/23  1241 03/04/23  0618 03/03/23  1015 03/02/23  0438   SODIUM 130* 129* 131* 132* 130* 130*   POTASSIUM 4.5 5.2 5.0 4.6 4.4 4.5   CHLORIDE 95* 93* 97* 96* 93* 94*   CO2 21.0* 20.0* 22.0 23.0 22.0 23.0   ANION GAP 14.0 16.0* 12.0 13.0 15.0 13.0   BUN 94* 86* 81* 80* 79* 71*   CREATININE 4.55* 4.74* 4.27* 4.06* 4.05* 4.18*   EGFR 12.7* 12.1* 13.7* 14.6* 14.7* 14.1*   GLUCOSE 180* 115* 185* 124* 172* 211*   CALCIUM 8.0* 8.2* 8.0* 8.3* 8.2* 8.1*   MAGNESIUM  --  1.7 1.6 1.6 1.6 1.7   PHOSPHORUS 7.2* 6.9* 6.5* 6.5* 6.0* 5.7*         Lab 03/07/23  0942 03/05/23  2247 03/05/23  1241 03/04/23  0618 03/03/23  1015 03/02/23  0438   TOTAL PROTEIN  --  7.0 6.8 6.9 6.6 6.3   ALBUMIN 2.9* 2.6* 2.9* 2.9* 2.7* 2.3*   GLOBULIN  --  4.4 3.9 4.0 3.9 4.0   ALT (SGPT)  --  24 21 18 19 18   AST (SGOT)  --  35 28 33 25 23   BILIRUBIN  --  0.2 0.2 0.2 0.3 <0.2   ALK PHOS  --  111 113 114 112 107                 Lab 03/02/23  1727 03/02/23  0438   IRON  --  21*   IRON SATURATION  --  10*    TIBC  --  201*   TRANSFERRIN  --  135*   ABO TYPING A  --    RH TYPING Positive  --    ANTIBODY SCREEN Negative  --          Brief Urine Lab Results  (Last result in the past 365 days)      Color   Clarity   Blood   Leuk Est   Nitrite   Protein   CREAT   Urine HCG        03/04/23 2046             68.0         03/04/23 2046 Orange  Comment: Any Substance that causes an abnormal urine color can alter the accuracy of the chemical reactions.   Turbid   Large (3+)   Large (3+)   Negative   100 mg/dL (2+)               Microbiology Results (last 10 days)     Procedure Component Value - Date/Time    Respiratory Panel PCR w/COVID-19(SARS-CoV-2) CAMPBELL/ANDREW/[facility]/COR/MAD/CHIDI In-House, NP Swab in UTM/VTM, 3-4 HR TAT - Swab, Nasopharynx [[id number]]  (Normal) Collected: 02/25/23 1657    Lab Status: Final result Specimen: Swab from Nasopharynx Updated: 02/25/23 1821     ADENOVIRUS, PCR Not Detected     Coronavirus 229E Not Detected     Coronavirus HKU1 Not Detected     Coronavirus NL63 Not Detected     Coronavirus OC43 Not Detected     COVID19 Not Detected     Human Metapneumovirus Not Detected     Human Rhinovirus/Enterovirus Not Detected     Influenza A PCR Not Detected     Influenza B PCR Not Detected     Parainfluenza Virus 1 Not Detected     Parainfluenza Virus 2 Not Detected     Parainfluenza Virus 3 Not Detected     Parainfluenza Virus 4 Not Detected     RSV, PCR Not Detected     Bordetella pertussis pcr Not Detected     Bordetella parapertussis PCR Not Detected     Chlamydophila pneumoniae PCR Not Detected     Mycoplasma pneumo by PCR Not Detected    Narrative:      In the setting of a positive respiratory panel with a viral infection PLUS a negative procalcitonin without other underlying concern for bacterial infection, consider observing off antibiotics or discontinuation of antibiotics and continue supportive care. If the respiratory panel is positive for atypical bacterial infection (Bordetella pertussis,  Chlamydophila pneumoniae, or Mycoplasma pneumoniae), consider antibiotic de-escalation to target atypical bacterial infection.          XR Chest 1 View    Result Date: 2/25/2023  Impression: Impression: Extensive multifocal ill-defined infiltrates are again seen bilaterally with diffuse interstitial changes. Small bilateral pleural effusions are noted. The changes are most pronounced in the lower lungs. There is mild worsening on the right. Electronically Signed: Bill Sr  2/25/2023 12:45 PM EST  Workstation ID: [id number]    XR Chest PA & Lateral    Result Date: 3/4/2023  Impression: Impression: 1.Mild pulmonary edema pattern. 2.Moderate right and small left pleural effusions. 3.Bibasilar opacities, which could reflect atelectasis or pneumonia. Electronically Signed: Raad Chavez  3/4/2023 5:06 PM EST  Workstation ID: [id number]      Results for orders placed during the hospital encounter of 11/15/22    Duplex carotid ultrasound CAR    Interpretation Summary  •  Proximal right internal carotid artery mild stenosis.  •  Proximal left internal carotid artery moderate stenosis.      Results for orders placed during the hospital encounter of 11/15/22    Duplex carotid ultrasound CAR    Interpretation Summary  •  Proximal right internal carotid artery mild stenosis.  •  Proximal left internal carotid artery moderate stenosis.      Results for orders placed during the hospital encounter of 07/30/19    Adult Transesophageal Echo (YG) W/ Cont if Necessary Per Protocol    Interpretation Summary  · Left ventricular systolic function is normal.  · Estimated EF appears to be in the range of 56 - 60%.  · There is moderate calcification of the aortic valve mainly affecting the left and right coronary cusp(s).  · A bicuspid valve is suggested with fused left-sided cusps.  · Moderate aortic valve stenosis is present.  · Peak/mean aortic valve gradients are 41/22 mmHg respectively.  · Planimeter aortic valve area is 1.5  cm².  · Mild aortic valve regurgitation is present.  · Mild tricuspid valve regurgitation is present.      Labs Pending at Discharge:  Pending Labs     Order Current Status    Hepatitis B Core Antibody, IgM In process    Hepatitis B Surface Antibody In process    Hepatitis B Surface Antigen In process          Procedures Performed           Consults:   Consults     Date and Time Order Name Status Description    3/5/2023  8:43 AM Inpatient Pulmonology Consult Completed     2/27/2023  3:27 PM Inpatient Urology Consult Completed     2/23/2023  8:02 AM Inpatient Nephrology Consult Completed     2/23/2023  8:00 AM Inpatient Nephrology Consult Completed     2/23/2023  7:40 AM Inpatient Endocrinology Consult      2/23/2023  7:27 AM Hospitalist (on-call MD unless specified)              Discharge Details        Discharge Medications      New Medications      Instructions Start Date   amoxicillin-clavulanate 500-125 MG per tablet  Commonly known as: AUGMENTIN   500 mg, Oral, Every 12 Hours Scheduled      bumetanide 1 MG tablet  Commonly known as: BUMEX   1 mg, Oral, Daily   Start Date: March 8, 2023     folic acid 1 MG tablet  Commonly known as: FOLVITE   1 mg, Oral, Daily   Start Date: March 8, 2023     midodrine 5 MG tablet  Commonly known as: PROAMATINE   5 mg, Oral, 3 Times Daily Before Meals      thiamine 100 MG tablet  Commonly known as: VITAMIN B1   100 mg, Oral, Daily   Start Date: March 8, 2023        Continue These Medications      Instructions Start Date   amiodarone 200 MG tablet  Commonly known as: PACERONE   200 mg, Oral, 3 Times Daily      apixaban 5 MG tablet tablet  Commonly known as: ELIQUIS   5 mg, Oral, Every 12 Hours Scheduled      atorvastatin 40 MG tablet  Commonly known as: LIPITOR   40 mg, Oral, Nightly      Baqsimi One Pack 3 MG/DOSE powder  Generic drug: Glucagon   3 mg, Nasal, As Needed      clopidogrel 75 MG tablet  Commonly known as: PLAVIX   75 mg, Oral, Daily      glucose blood test strip    "TEST 2 TIMES DAILY AS INSTRUCTED      Insulin Lispro (1 Unit Dial) 100 UNIT/ML solution pen-injector  Commonly known as: HUMALOG   Subcutaneous, 3 Times Daily, SSI: 151-200 2U 201-250 4U 251-300 6U 301-350 8U 351-400 10U      Insulin Pen Needle 29G X 12.7MM misc   ONE MISCELLANEOUS EVERY EVENING      Insulin Syringe-Needle U-100 30G X 1/2\" 0.5 ML misc   USES TWICE A DAY AS DIRECTED.  DX: 250.00      Multivitamin Adult tablet tablet  Generic drug: multivitamin with minerals   1 mg, Oral, Daily      pantoprazole 40 MG EC tablet  Commonly known as: PROTONIX   40 mg, Oral, Daily      tamsulosin 0.4 MG capsule 24 hr capsule  Commonly known as: FLOMAX   0.4 mg, Oral, Daily         Stop These Medications    furosemide 40 MG tablet  Commonly known as: LASIX     Insulin Glargine 100 UNIT/ML injection pen  Commonly known as: LANTUS SOLOSTAR            Allergies   Allergen Reactions   • Contrast Dye (Echo Or Unknown Ct/Mr) Itching         Discharge Disposition:  Long Term Care (DC - External)    Diet:  Hospital:  Diet Order   Procedures   • Diet: Diabetic Diets, Renal Diets; Consistent Carbohydrate; Low Phosphorus; Texture: Regular Texture (IDDSI 7); Fluid Consistency: Thin (IDDSI 0)         Discharge Activity:         CODE STATUS:  Code Status and Medical Interventions:   Ordered at: 02/23/23 0819     Level Of Support Discussed With:    Patient     Code Status (Patient has no pulse and is not breathing):    No CPR (Do Not Attempt to Resuscitate)     Medical Interventions (Patient has pulse or is breathing):    Full Support     Comments:    Discussed and confirmed with the patient directly         Future Appointments   Date Time Provider Department Center   5/24/2023 12:00 PM [facility]  [facility]   5/24/2023 12:45 PM [facility]  [facility]   5/24/2023  2:00 PM ROOM 1,  [facility]  [facility] None           Time spent on Discharge including face to face service:  40 " minutes    Signature:    Electronically signed by Britney Joiner DO, 03/07/23, 11:47 AM EST.      Part of this note may be an electronic transcription/translation of spoken language to printed text using the Dragon Dictation System.

## 2023-03-08 NOTE — CONSULTS
Inpatient Endocrine Consult  Consultation requested by hospitalist team for hypoglycemia  Patient Care Team:  Arnulfo oMnroy MD as PCP - General (Internal Medicine)  Kym Aparicio MD as Consulting Physician (Endocrinology)    Chief Complaint: Hypoglycemia    HPI: This is a 77-year-old male with history of type 2 diabetes, hypertension, hyperlipidemia and chronic kidney disease was discharged only yesterday from the hospital came in because of significant hypoglycemia with a blood sugar of 39.  He was subsequently admitted for further evaluation management.  Patient is very brittle and was not on any scheduled insulin but was on sliding scale.  He is not on any insulin at this time.  He is alert and oriented and able to carry on conversation.  He is eating well.    Past Medical History:   Diagnosis Date   • Alcoholism (Formerly [facility])     hx of   • DM2 (diabetes mellitus, type 2) (Formerly [facility])    • Hyperlipidemia    • Hypertension    • PVD (peripheral vascular disease) (Formerly [facility])     PTA 2017       Social History     Socioeconomic History   • Marital status:    Tobacco Use   • Smoking status: Some Days     Packs/day: 0.25     Types: Cigarettes   • Smokeless tobacco: Never   Vaping Use   • Vaping Use: Never used   Substance and Sexual Activity   • Alcohol use: No   • Drug use: No       Family History   Problem Relation Age of Onset   • Cancer Mother    • Hypertension Father    • Hypertension Brother        Allergies   Allergen Reactions   • Contrast Dye (Echo Or Unknown Ct/Mr) Itching       ROS:   Constitutional:  Denies fatigue, tiredness.    Eyes:  Denies change in visual acuity   HENT:  Denies nasal congestion or sore throat   Respiratory: Denies cough, shortness of breath.   Cardiovascular:  Denies chest pain, edema   GI:  Denies abdominal pain, nausea, vomiting.   :  Denies polyuria and polydipsia  Musculoskeletal:  Denies back pain or joint pain   Integument:  Denies dry skin, rash   Neurologic:  Denies headache,  focal weakness or sensory changes   Endocrine:  Denies polyuria or polydipsia   Psychiatric:  Denies depression or anxiety      Vitals:    03/08/23 1600   BP: 111/65   Pulse: 63   Resp: 11   Temp: 97.5 °F (36.4 °C)   SpO2: 95%      Body mass index is 23.68 kg/m².     Physical Exam:  GEN: NAD, conversant  EYES: EOMI, PERRL, no conjunctival erythema  NECK: no thyromegaly, full ROM   CV: RRR, no murmurs/rubs/gallops, no peripheral edema  LUNG: CTAB, no wheezes/rales/rhonchi  SKIN: no rashes, no acanthosis  MSK: no deformities, full ROM of all extremities  NEURO: no tremors, DTR normal  PSYCH: AOX3, appropriate mood, affect normal      Results Review:     I reviewed the patient's new clinical results.    Lab Results   Component Value Date    GLUCOSE 83 03/08/2023    BUN 98 (H) 03/08/2023    CREATININE 4.81 (H) 03/08/2023    EGFRIFNONA 49 (L) 11/05/2019    BCR 20.4 03/08/2023    K 5.7 (H) 03/08/2023    CO2 19.0 (L) 03/08/2023    CALCIUM 8.3 (L) 03/08/2023    ALBUMIN 2.9 (L) 03/07/2023    LABIL2 1.3 03/04/2022    AST 35 03/05/2023    ALT 24 03/05/2023       Lab Results   Component Value Date    HGBA1C 7.4 (H) 02/23/2023    HGBA1C 7.6 (H) 02/02/2023    HGBA1C 10.4 (H) 12/13/2022     Lab Results   Component Value Date    CREATININE 4.81 (H) 03/08/2023     Results from last 7 days   Lab Units 03/08/23  1752 03/08/23  1552 03/08/23  1247 03/08/23  1128 03/08/23  1004 03/08/23  0914   GLUCOSE mg/dL 125* 140* 127* 134* 214* 199*       Medication Review: Reviewed.       Current Facility-Administered Medications:   •  acetaminophen (TYLENOL) tablet 650 mg, 650 mg, Oral, Q4H PRN, Noble, Angie D, APRN  •  amiodarone (PACERONE) tablet 200 mg, 200 mg, Oral, TID, Angie Dickey APRN, 200 mg at 03/08/23 1608  •  atorvastatin (LIPITOR) tablet 40 mg, 40 mg, Oral, Nightly, nAgie Dickey APRN  •  budesonide (PULMICORT) nebulizer solution 0.5 mg, 0.5 mg, Nebulization, BID - RT, Angie Dickey APRN, 0.5 mg at 03/08/23  1211  •  [START ON 3/9/2023] cefTRIAXone (ROCEPHIN) 1 g in sodium chloride 0.9 % 100 mL IVPB, 1 g, Intravenous, Q24H, Angie Dickey APRN  •  clopidogrel (PLAVIX) tablet 75 mg, 75 mg, Oral, Daily, Angie Dickey APRN, 75 mg at 03/08/23 1339  •  dextrose (D50W) (25 g/50 mL) IV injection 25 g, 25 g, Intravenous, Q15 Min PRN, Agnie Dickey APRN  •  dextrose (D50W) (25 g/50 mL) IV injection 50 mL, 50 mL, Intravenous, Q1H PRN, Neil Hayes MD, 50 mL at 03/08/23 0838  •  dextrose (GLUTOSE) oral gel 15 g, 15 g, Oral, Q15 Min PRN, Angie Dickey APRN  •  folic acid (FOLVITE) tablet 1 mg, 1 mg, Oral, Daily, Angie Dickey APRN, 1 mg at 03/08/23 1339  •  glucagon (human recombinant) (GLUCAGEN DIAGNOSTIC) 1 mg in sterile water (preservative free) 1 mL injection, 1 mg, Intramuscular, Q15 Min PRN, Angie Dickey APRN  •  ipratropium-albuterol (DUO-NEB) nebulizer solution 3 mL, 3 mL, Nebulization, 4x Daily - RT, Angie Dickey APRN, 3 mL at 03/08/23 1202  •  midodrine (PROAMATINE) tablet 10 mg, 10 mg, Oral, TID AC, Emanuel Beauchamp MD, 10 mg at 03/08/23 1608  •  mineral oil-hydrophilic petrolatum (AQUAPHOR) ointment 1 application, 1 application, Topical, BID PRN, Marky Funk MD  •  multivitamin with minerals 1 tablet, 1 tablet, Oral, Daily, Angie Dickey APRN, 1 tablet at 03/08/23 1339  •  nitroglycerin (NITROSTAT) SL tablet 0.4 mg, 0.4 mg, Sublingual, Q5 Min PRN, Angie Dickey APRN  •  ondansetron (ZOFRAN) injection 4 mg, 4 mg, Intravenous, Q6H PRN, Angie Dickey APRN  •  pantoprazole (PROTONIX) EC tablet 40 mg, 40 mg, Oral, Daily, Angie Dickey APRN, 40 mg at 03/08/23 1339  •  sodium chloride 0.9 % flush 10 mL, 10 mL, Intravenous, Q12H, Angie Dickey APRN  •  sodium chloride 0.9 % flush 10 mL, 10 mL, Intravenous, PRN, Angie Dickey APRN  •  sodium chloride 0.9 % infusion 40 mL, 40 mL, Intravenous, PRN, Angie Dickey APRN  •  sodium chloride 0.9 %  infusion, 75 mL/hr, Intravenous, Continuous, Emanuel Beauchamp MD, Last Rate: 75 mL/hr at 03/08/23 1735, 75 mL/hr at 03/08/23 1735  •  thiamine (VITAMIN B-1) tablet 100 mg, 100 mg, Oral, Daily, Angie Dickey APRN, 100 mg at 03/08/23 1339          Assessment and plan:  Hypoglycemia: Etiology unknown, at this time I will DC all insulin as well as all antidiabetic agents and follow blood sugars.  Renal failure could be playing a role.  We will check ACTH stimulation test as well.    Diabetes mellitus type 2: Complicated by hypoglycemia, avoid any antidiabetic agents and follow blood sugars.    Renal failure: On hemodialysis.    Thank you very much for the consultation      Mekhi Vidal MD FACE.

## 2023-03-08 NOTE — H&P
[facility]  History & Physical    Patient Name: Holger Ibrahim  : 1945  MRN: [id number]  Primary Care Physician:  Arnulfo Monroy MD  Date of admission: 3/8/2023  Date and Time of Service: 2023   at 10:12 EST    Subjective      Chief Complaint: Hypoglycemia    History of Present Illness: Holger Ibrahim is a 77 y.o. male who presented to [facility] on 3/8/2023 presents to the ER today by EMS from local [facility].  Upon his arrival to the emergency room he was altered mental status with a reported low blood sugar of 39, he was tremulous at arrival Dr. Hayes went to bedside for initial evaluation.  Blood sugar was treated and patient returned to his reported baseline mentation.       Patient reported eating well last night, had no other associated comlaints.  Discharged from inpatient status yesterday.  Patient was admitted 2023 with similar symptoms.  Upon discharge yesterday the plan was for nephrology to start dialysis outpatient due to upward trending creatinine.    In ED, afebrile, /65 and on room air.  Blood sugar initially 39, most recently 214.  Sodium 131, K5.7, creatinine 4.81, Pro-John 0.54, WBC 4.20, hemoglobin 9.3, blood cultures pending and respiratory panel negative.    XR Chest 1 View    Result Date: 3/8/2023  Impression: 1.No improvement in appearance of the chest 2.Bibasilar densities which could relate to pleural effusions and possibly atelectasis. 3.Prominence of perihilar prominent markings which may be reflective of edema. Electronically Signed: Angelito Davison  3/8/2023 7:13 AM EST  Workstation ID: [id number]      Review of Systems   Constitutional: Positive for malaise/fatigue.   Cardiovascular: Positive for leg swelling.   Respiratory: Positive for cough and shortness of breath.         Patient reports occasional SOB and dry cough.   Musculoskeletal: Positive for muscle weakness.   Neurological: Positive for weakness.    All other systems reviewed and are negative.       Personal History     Past Medical History:   Diagnosis Date   • Alcoholism (HCC)     hx of   • DM2 (diabetes mellitus, type 2) (HCC)    • Hyperlipidemia    • Hypertension    • PVD (peripheral vascular disease) (HCC)     PTA 2017       Past Surgical History:   Procedure Laterality Date   • APPENDECTOMY     • CARDIAC CATHETERIZATION N/A 11/5/2019    Procedure: PERIPHERAL ANGIOGRAPHY, Rt popliteal PTA;  Surgeon: Jonny Ferguson MD;  Location: [facility] CATH INVASIVE LOCATION;  Service: Cardiovascular   • EYE SURGERY Right    • HERNIA REPAIR     • LEG SURGERY  01/2020   • POPLITEAL ARTERY ANGIOPLASTY Right 2017   • SPLENECTOMY      partial spleen removal       Family History: family history includes Cancer in his mother; Hypertension in his brother and father. Otherwise pertinent FHx was reviewed and not pertinent to current issue.    Social History:  reports that he has been smoking cigarettes. He has been smoking an average of .25 packs per day. He has never used smokeless tobacco. He reports that he does not drink alcohol and does not use drugs.    Home Medications:  Prior to Admission Medications     Prescriptions Last Dose Informant Patient Reported? Taking?    amiodarone (PACERONE) 200 MG tablet   Yes Yes    Take 1 tablet by mouth 3 (Three) Times a Day.    amoxicillin-clavulanate (AUGMENTIN) 500-125 MG per tablet   No Yes    Take 1 tablet by mouth Every 12 (Twelve) Hours for 5 doses. Indications: Pneumonia    apixaban (ELIQUIS) 5 MG tablet tablet   Yes Yes    Take 1 tablet by mouth Every 12 (Twelve) Hours.    atorvastatin (LIPITOR) 40 MG tablet   Yes Yes    Take 1 tablet by mouth Every Night.    bumetanide (BUMEX) 1 MG tablet   No Yes    Take 1 tablet by mouth Daily.    clopidogrel (PLAVIX) 75 MG tablet   Yes Yes    Take 1 tablet by mouth Daily.    folic acid (FOLVITE) 1 MG tablet   No Yes    Take 1 tablet by mouth Daily.    Glucagon (Baqsimi One Pack) 3  "MG/DOSE powder   Yes Yes    3 mg into the nostril(s) as directed by provider As Needed.    insulin glargine (LANTUS, SEMGLEE) 100 UNIT/ML injection   Yes Yes    Inject 10 Units under the skin into the appropriate area as directed Daily.    Insulin Lispro, 1 Unit Dial, (HUMALOG) 100 UNIT/ML solution pen-injector   Yes Yes    Inject  under the skin into the appropriate area as directed 4 (Four) Times a Day. SSI:  151-200 2U  201-250 4U  251-300 6U  301-350 8U  351-400 10U    midodrine (PROAMATINE) 5 MG tablet   No Yes    Take 1 tablet by mouth 3 (Three) Times a Day Before Meals.    Multiple Vitamins-Minerals (MULTIVITAMIN ADULT) tablet   Yes Yes    Take 1 mg by mouth Daily.    pantoprazole (PROTONIX) 40 MG EC tablet   Yes Yes    Take 1 tablet by mouth Daily.    tamsulosin (FLOMAX) 0.4 MG capsule 24 hr capsule   Yes Yes    Take 1 capsule by mouth Daily.    thiamine (VITAMIN B1) 100 MG tablet   No Yes    Take 1 tablet by mouth Daily.    glucose blood test strip   Yes No    TEST 2 TIMES DAILY AS INSTRUCTED    Insulin Pen Needle 29G X 12.7MM misc   Yes No    ONE MISCELLANEOUS EVERY EVENING    Insulin Syringe-Needle U-100 30G X 1/2\" 0.5 ML misc   Yes No    USES TWICE A DAY AS DIRECTED.  DX: 250.00            Allergies:  Allergies   Allergen Reactions   • Contrast Dye (Echo Or Unknown Ct/Mr) Itching       Objective      Vitals:   Temp:  [95.7 °F (35.4 °C)] 95.7 °F (35.4 °C)  Heart Rate:  [60-79] 62  Resp:  [12-14] 12  BP: ()/(56-67) 114/65    Physical Exam  Vitals reviewed.   Constitutional:       Appearance: He is ill-appearing.   HENT:      Head: Normocephalic.      Mouth/Throat:      Mouth: Mucous membranes are dry.   Eyes:      Pupils: Pupils are equal, round, and reactive to light.      Comments: Blind in right eye   Cardiovascular:      Rate and Rhythm: Normal rate and regular rhythm.      Pulses: Normal pulses.      Heart sounds: Normal heart sounds.   Pulmonary:      Effort: Pulmonary effort is normal.      " Breath sounds: Rhonchi present.   Abdominal:      General: Abdomen is flat. Bowel sounds are normal.      Palpations: Abdomen is rigid.   Musculoskeletal:      Right lower leg: Edema present.      Left lower leg: Edema present.   Skin:     General: Skin is warm and dry.      Coloration: Skin is ashen.              Neurological:      Mental Status: He is alert and oriented to person, place, and time. Mental status is at baseline.   Psychiatric:         Mood and Affect: Mood normal.         Behavior: Behavior normal.          Result Review    Result Review:  I have personally reviewed the results from the time of this admission to 3/8/2023 10:02 EST and agree with these findings:  [x]  Laboratory  [x]  Microbiology  [x]  Radiology  [x]  EKG/Telemetry   []  Cardiology/Vascular   []  Pathology  []  Old records  []  Other:      Assessment & Plan        Active Hospital Problems:  There are no active hospital problems to display for this patient.    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)  - Glucose 39 on admit, monitor closely  - Accu-Cheks before meals and at bedtime  - Hypoglycemia protocol ordered  - Endocrinology consulted  - Healthy heart/consistent carb diet  - A1c 7.4 on 2/23/23     SHAHRAM  Stage IV kidney disease  - Creatinine 4.81, baseline 1.6  - Nephrology consulted  - Continue home Bumex  - Avoid nephrotoxic medications/dyes if at all possible  - Hold eliquis until nephrology determines if dialysis catheter is needed to be placed    Bibasilar pneumonia  - CXR reviewed, worsening opacities  - Was DC'd on Augmentin, will hold  - Continue Rocephin  - DuoNebs and Pulmicort  - Currently on room air, continue to monitor    Left heel wound  - WOCN consulted      Iron deficiency anemia  - Hemoglobin 9.3, appears to be chronic  - Monitor labs and treat as needed     H/O HTN  HLD  - /65  - Lipid panel reviewed from last admit  - Continue statin and midodrine     Diastolic  dysfunction  Nonischemic cardiomyopathy  Aortic stenosis  Pitting edema  - Sees Dr. Rasheed outpatient  - Recently at [facility] and underwent a cardiac cath, was deemed too frail to undergo a TAVR at that time, especially with progressive renal insufficiency, follows heart failure clinic and they are still proceeding with work-up for possible TAVR in the near future.  - According to care everywhere records last 2D echo 2/1/23 showed EF 33% with moderate to severe aortic stenosis  - Continue home diuretics, may follow nephrology recommendations  - EKG ordered and shows normal sinus rhythm  - Check proBNP  - Palliative team recently followed patient last admission     Urinary retention  - Continue chronic Diaz catheter to bedside drainage, present on admission  - According to care everywhere records was followed by urology at recent admission to [facility]  - Continue home Flomax  - U/A negative     Proximal A-fib  - EKG showed NSR  - Continue home amiodarone and Eliquis     DVT prophylaxis  -Continue home Plavix  - Hold eliquis until nephrology determines if dialysis catheter is needed to be placed     COPD  Pulmonary nodule: 13mm RLL with an internal solid 3mm nodule, and a 6mm RLL  Recent COVID infection requiring hospitalization at [facility] January 2023  Current smoker  - Currently on room air and reports occasional shortness of breath  - Smoking cessation education     Right leg claudication s/p popliteal artery angioplasty 2019      CODE STATUS:  DNR     Admission Status:  I believe this patient meets inpatient status.    I discussed the patient's findings and my recommendations with patient.      Signature: Electronically signed by JAH Cormier, 03/08/23, 10:02 EST.  [facility] Hospitalist Team

## 2023-03-08 NOTE — ED PROVIDER NOTES
Subjective   History of Present Illness  Chief Complaint: Hypoglycemia      HPI: Patient is a 77-year-old male presents to the ER today by EMS from local extended care facility.  Upon his arrival to the emergency room he was altered mental status with a reported low blood sugar of 39, he was tremulous at arrival Dr. Hayes went to bedside for initial evaluation.  Blood sugar was treated and patient returned to his reported baseline mentation.      Patient reported eating well last night, had no other associated comlaints.  Discharged from inpatient status yesterday    PCP: Porfirio  Nephrology: Nito  Endocrinology: Markus        Review of Systems   Unable to perform ROS: Mental status change       Past Medical History:   Diagnosis Date   • Alcoholism (HCC)     hx of   • DM2 (diabetes mellitus, type 2) (HCC)    • Hyperlipidemia    • Hypertension    • PVD (peripheral vascular disease) ([facility])     PTA 2017       Allergies   Allergen Reactions   • Contrast Dye (Echo Or Unknown Ct/Mr) Itching       Past Surgical History:   Procedure Laterality Date   • APPENDECTOMY     • CARDIAC CATHETERIZATION N/A 11/5/2019    Procedure: PERIPHERAL ANGIOGRAPHY, Rt popliteal PTA;  Surgeon: Jonny Ferguson MD;  Location: [facility] INVASIVE LOCATION;  Service: Cardiovascular   • EYE SURGERY Right    • HERNIA REPAIR     • LEG SURGERY  01/2020   • POPLITEAL ARTERY ANGIOPLASTY Right 2017   • SPLENECTOMY      partial spleen removal       Family History   Problem Relation Age of Onset   • Cancer Mother    • Hypertension Father    • Hypertension Brother        Social History     Socioeconomic History   • Marital status:    Tobacco Use   • Smoking status: Some Days     Packs/day: 0.25     Types: Cigarettes   • Smokeless tobacco: Never   Vaping Use   • Vaping Use: Never used   Substance and Sexual Activity   • Alcohol use: No   • Drug use: No           Objective   Physical Exam  Vitals reviewed.   Constitutional:       General: He  "is not in acute distress.     Appearance: He is ill-appearing.   HENT:      Head: Normocephalic.      Mouth/Throat:      Mouth: Mucous membranes are dry.      Pharynx: Oropharynx is clear.   Eyes:      Extraocular Movements: Extraocular movements intact.      Pupils: Pupils are equal, round, and reactive to light.   Cardiovascular:      Rate and Rhythm: Normal rate.      Pulses: Normal pulses.      Heart sounds: Normal heart sounds.     No gallop.   Pulmonary:      Effort: Pulmonary effort is normal.      Breath sounds: Wheezing (posterior, bases) present.   Abdominal:      General: Bowel sounds are normal.      Palpations: Abdomen is soft.   Musculoskeletal:      Cervical back: Neck supple. No rigidity.   Skin:     General: Skin is dry.      Capillary Refill: Capillary refill takes less than 2 seconds.   Neurological:      General: No focal deficit present.      Mental Status: He is alert and oriented to person, place, and time.         Procedures           ED Course  ED Course as of 03/08/23 1229   Wed Mar 08, 2023   0801 Procalcitonin(!): 0.45 []   0834 Nurse reported increased lethargy, blood sugar was checked and noted at 69 patient has had a full box lunch while in the emergency room.  Additional orders placed. []   0856 Spoke with Manaas via telephone patient's daughter-in-law Manasa, she was updated on the patient's emergency room stay and plan for admission. []      ED Course User Index  [] Soo Matthews, APRN             /88   Pulse 62   Temp 95.9 °F (35.5 °C) (Rectal)   Resp 14   Ht 177.8 cm (70\")   Wt 74.8 kg (165 lb)   SpO2 95%   BMI 23.68 kg/m²   Labs Reviewed   BASIC METABOLIC PANEL - Abnormal; Notable for the following components:       Result Value    BUN 98 (*)     Creatinine 4.81 (*)     Sodium 131 (*)     Potassium 5.7 (*)     Chloride 95 (*)     CO2 19.0 (*)     Calcium 8.3 (*)     Anion Gap 17.0 (*)     eGFR 11.9 (*)     All other components within normal limits    " "Narrative:     GFR Normal >60  Chronic Kidney Disease <60  Kidney Failure <15    The GFR formula is only valid for adults with stable renal function between ages 18 and 70.   CBC WITH AUTO DIFFERENTIAL - Abnormal; Notable for the following components:    RBC 3.22 (*)     Hemoglobin 9.3 (*)     Hematocrit 29.2 (*)     Neutrophil % 87.9 (*)     Lymphocyte % 6.8 (*)     Monocyte % 3.8 (*)     Lymphocytes, Absolute 0.30 (*)     nRBC 0.3 (*)     All other components within normal limits   PROCALCITONIN - Abnormal; Notable for the following components:    Procalcitonin 0.45 (*)     All other components within normal limits    Narrative:     As a Marker for Sepsis (Non-Neonates):    1. <0.5 ng/mL represents a low risk of severe sepsis and/or septic shock.  2. >2 ng/mL represents a high risk of severe sepsis and/or septic shock.    As a Marker for Lower Respiratory Tract Infections that require antibiotic therapy:    PCT on Admission    Antibiotic Therapy       6-12 Hrs later    >0.5                Strongly Recommended  >0.25 - <0.5        Recommended   0.1 - 0.25          Discouraged              Remeasure/reassess PCT  <0.1                Strongly Discouraged     Remeasure/reassess PCT    As 28 day mortality risk marker: \"Change in Procalcitonin Result\" (>80% or <=80%) if Day 0 (or Day 1) and Day 4 values are available. Refer to http://www.[employer]-pct-calculator.com    Change in PCT <=80%  A decrease of PCT levels below or equal to 80% defines a positive change in PCT test result representing a higher risk for 28-day all-cause mortality of patients diagnosed with severe sepsis for septic shock.    Change in PCT >80%  A decrease of PCT levels of more than 80% defines a negative change in PCT result representing a lower risk for 28-day all-cause mortality of patients diagnosed with severe sepsis or septic shock.      BNP (IN-HOUSE) - Abnormal; Notable for the following components:    proBNP >70,000.0 (*)     All other " components within normal limits    Narrative:     Among patients with dyspnea, NT-proBNP is highly sensitive for the detection of acute congestive heart failure. In addition NT-proBNP of <300 pg/ml effectively rules out acute congestive heart failure with 99% negative predictive value.    Results may be falsely decreased if patient taking Biotin.     POCT GLUCOSE FINGERSTICK - Abnormal; Notable for the following components:    Glucose 209 (*)     All other components within normal limits   POC LACTATE - Abnormal; Notable for the following components:    Lactate 3.3 (*)     All other components within normal limits   POCT GLUCOSE FINGERSTICK - Abnormal; Notable for the following components:    Glucose 69 (*)     All other components within normal limits   POCT GLUCOSE FINGERSTICK - Abnormal; Notable for the following components:    Glucose 199 (*)     All other components within normal limits   POCT GLUCOSE FINGERSTICK - Abnormal; Notable for the following components:    Glucose 214 (*)     All other components within normal limits   POCT GLUCOSE FINGERSTICK - Abnormal; Notable for the following components:    Glucose 134 (*)     All other components within normal limits   RESPIRATORY PANEL PCR W/ COVID-19 (SARS-COV-2) CAMPBELL/ANDREW/KILLIAN/PAD/[facility]/MAD/CH[facility]I IN-HOUSE, NP [facility]AB IN [facility], 3-4 HR TAT - Normal    Narrative:     In the setting of a positive respiratory panel with a viral infection PLUS a negative procalcitonin without other underlying concern for bacterial infection, consider observing off antibiotics or discontinuation of antibiotics and continue supportive care. If the respiratory panel is positive for atypical bacterial infection (Bordetella pertussis, Chlamydophila pneumoniae, or Mycoplasma pneumoniae), consider antibiotic de-escalation to target atypical bacterial infection.   POCT GLUCOSE FINGERSTICK - Normal   POCT GLUCOSE FINGERSTICK - Normal   BLOOD CULTURE   BLOOD CULTURE   LACTIC ACID, REFLEX   POCT  GLUCOSE FINGERSTICK   POCT GLUCOSE FINGERSTICK   POCT GLUCOSE FINGERSTICK   POCT GLUCOSE FINGERSTICK   POCT GLUCOSE FINGERSTICK   CBC AND DIFFERENTIAL    Narrative:     The following orders were created for panel order CBC & Differential.  Procedure                               Abnormality         Status                     ---------                               -----------         ------                     CBC Auto Differential[[id number]]        Abnormal            Final result                 Please view results for these tests on the individual orders.     Medications   dextrose (D50W) (25 g/50 mL) IV injection 50 mL (50 mL Intravenous Given 3/8/23 0838)   sodium chloride 0.9 % flush 10 mL (has no administration in time range)   sodium chloride 0.9 % flush 10 mL (has no administration in time range)   sodium chloride 0.9 % infusion 40 mL (has no administration in time range)   nitroglycerin (NITROSTAT) SL tablet 0.4 mg (has no administration in time range)   acetaminophen (TYLENOL) tablet 650 mg (has no administration in time range)   ondansetron (ZOFRAN) injection 4 mg (has no administration in time range)   cefTRIAXone (ROCEPHIN) 1 g in sodium chloride 0.9 % 100 mL IVPB (has no administration in time range)   dextrose (GLUTOSE) oral gel 15 g (has no administration in time range)   dextrose (D50W) (25 g/50 mL) IV injection 25 g (has no administration in time range)   glucagon (human recombinant) (GLUCAGEN DIAGNOSTIC) 1 mg in sterile water (preservative free) 1 mL injection (has no administration in time range)   ipratropium-albuterol (DUO-NEB) nebulizer solution 3 mL (3 mL Nebulization Given 3/8/23 1202)   budesonide (PULMICORT) nebulizer solution 0.5 mg (0.5 mg Nebulization Given 3/8/23 1211)   amiodarone (PACERONE) tablet 200 mg (has no administration in time range)   atorvastatin (LIPITOR) tablet 40 mg (has no administration in time range)   bumetanide (BUMEX) tablet 1 mg (has no administration in time  range)   clopidogrel (PLAVIX) tablet 75 mg (has no administration in time range)   folic acid (FOLVITE) tablet 1 mg (has no administration in time range)   midodrine (PROAMATINE) tablet 5 mg (has no administration in time range)   multivitamin with minerals 1 tablet (has no administration in time range)   pantoprazole (PROTONIX) EC tablet 40 mg (has no administration in time range)   tamsulosin (FLOMAX) 24 hr capsule 0.4 mg (has no administration in time range)   thiamine (VITAMIN B-1) tablet 100 mg (has no administration in time range)   cefTRIAXone (ROCEPHIN) 1 g in sodium chloride 0.9 % 100 mL IVPB (0 g Intravenous Stopped 3/8/23 0947)     XR Chest 1 View    Result Date: 3/8/2023  Impression: 1.No improvement in appearance of the chest 2.Bibasilar densities which could relate to pleural effusions and possibly atelectasis. 3.Prominence of perihilar prominent markings which may be reflective of edema. Electronically Signed: Angelito Davison  3/8/2023 7:13 AM EST  Workstation ID: [id number]                               Medical Decision Making  Mr. Ibrahim presents to the ED for hypoglycemia with a blood sugar in the 30s, he was tremulous at his arrival blood sugar corrected, patient returned to alert oriented and at his baseline.  Physical exam noted rhonchi posterior, chest x-ray obtained noted worsening pleural effusions could not rule out pneumonia, with patient's significant history he was treated with a dose of Rocephin, Pro-John was 0.45.  Blood cultures pending at time of admission, lactic acid was elevated at 3.3 though patient was not given the 30 and mL/kg sepsis bolus due to his elevated creatinine and concerns for fluid overload.  Patient has a known history of chronic kidney disease, creatinine today at 4.81 slightly elevated from his baseline, previous 4.55, he is not a dialysis patient.  Hemoglobin notes anemia at 9.3 which is elevated from his previous at 8.6.  With review of the chart patient was discharged  from our inpatient facility less than 24 hours ago for similar complaints and diagnoses.  Concerns that patient had a full meal last night before bed and was hypoglycemic this morning, throughout his emergency room stay he was given a box lunch, he had an episode of lethargy following this blood sugar was checked did not 60 he was given an additional dose of D50.  With his readmission so quickly following discharge he will be admitted again to the hospitalist, patient case was discussed with Angie nurse practitioner with the hospitalist group admitting physician Dr. Funk, who agreed that patient would benefit from return admission and evaluation.  Patient gave verbal understanding of the ED findings and plan of care.  Patient was alert oriented at his baseline at time of admission, he denied further questions.    Patient case was discussed with Dr. Hayes.    Chart review:  3/7/2023 Discharge from [facility], dx include Hypoglycemia, Unstageable pressure ulcer      This document is intended for medical expert use only. Reading of this document by patients and/or patient's family without participating medical staff guidance may result in misinterpretation and unintended morbidity. Any interpretation of such data is the responsibility of the patient and/or family member responsible for the patient in concert with their primary or specialist providers, not to be left for sources of online searches such as [employer], [employer] or similar queries. Relying on these approaches to knowledge may result in misinterpretation, misguided goals of care and even death should patients or family members try recommendations outside of the realm of professional medical care in a supervised inpatient environment.     Appropriate PPE worn during exam.    Hypoglycemia: complicated acute illness or injury that poses a threat to life or bodily functions  Pneumonia due to infectious organism, unspecified laterality, unspecified part of lung: acute  illness or injury  Amount and/or Complexity of Data Reviewed  External Data Reviewed: notes.  Labs: ordered. Decision-making details documented in ED Course.  Radiology: ordered and independent interpretation performed. Decision-making details documented in ED Course.      Risk  Prescription drug management.  Decision regarding hospitalization.          Final diagnoses:   Hypoglycemia   Pneumonia due to infectious organism, unspecified laterality, unspecified part of lung       ED Disposition  ED Disposition     ED Disposition   Decision to Admit    Condition   --    Comment   Level of Care: Telemetry [5]   Diagnosis: Hypoglycemia [260681]   Admitting Physician: TERRI SANCHEZ [[id number]]   Attending Physician: TERRI SANCHEZ [[id number]]   Certification: I Certify That Inpatient Hospital Services Are Medically Necessary For Greater Than 2 Midnights               No follow-up provider specified.       Medication List      No changes were made to your prescriptions during this visit.          Soo Matthews, APRN  03/08/23 1229

## 2023-03-08 NOTE — PROGRESS NOTES
"[facility] PROGRESS NOTE     LOS: 0 days    Patient Care Team:  Arnulfo Monroy MD as PCP - General (Internal Medicine)  Kym Aparicio MD as Consulting Physician (Endocrinology)      Subjective     Patient with history of chronic kidney disease,, diabetes, hypertension, BPH, aortic stenosis who was discharged yesterday.  He was admitted for acute on chronic renal insufficiency.  His creatinine had increased with diuresis.  He had a Diaz cath in place for urinary retention.  Patient was brought back to the hospital due to hypoglycemia.  Patient feeling very tired this morning but denies any chest pain, shortness of breath, nausea or vomiting    Objective     Vital Sign Min/Max for last 24 hours  Temp:  [95.7 °F (35.4 °C)-95.9 °F (35.5 °C)] 95.9 °F (35.5 °C)  Heart Rate:  [60-79] 62  Resp:  [12-14] 14  BP: ()/(56-88) 107/88                       Flowsheet Rows    Flowsheet Row First Filed Value   Admission Height 177.8 cm (70\") Documented at 03/08/2023 0637   Admission Weight 74.8 kg (165 lb) Documented at 03/08/2023 0637          I/O this shift:  In: 100 [IV Piggyback:100]  Out: -   No intake/output data recorded.    Physical Exam:  Physical Exam    General Appearance: Chronically ill-appearing, NAD  Skin: warm and dry  HEENT: oral mucosa normal, nonicteric sclera  Neck: supple, no JVD  Lungs: Few scattered wheezes bilaterally  Heart: RRR, normal S1 and S2  Abdomen: soft, nontender, nondistended  : no palpable bladder  Extremities: Trace bilateral lower extreme edema.  Scrotal edema+  Neuro: normal speech and mental status        LABS:  Lab Results   Component Value Date    CALCIUM 8.3 (L) 03/08/2023    PHOS 7.2 (H) 03/07/2023     Results from last 7 days   Lab Units 03/08/23  0654 03/07/23  0942 03/05/23  2247 03/05/23  1241 03/04/23  0618   MAGNESIUM mg/dL  --   --  1.7 1.6 1.6   SODIUM mmol/L 131* 130* 129* 131* 132*   POTASSIUM mmol/L 5.7* 4.5 5.2 5.0 4.6   CHLORIDE mmol/L 95* 95* 93* 97* 96* "   CO2 mmol/L 19.0* 21.0* 20.0* 22.0 23.0   BUN mg/dL 98* 94* 86* 81* 80*   CREATININE mg/dL 4.81* 4.55* 4.74* 4.27* 4.06*   GLUCOSE mg/dL 83 180* 115* 185* 124*   CALCIUM mg/dL 8.3* 8.0* 8.2* 8.0* 8.3*   WBC 10*3/mm3 4.20 5.00 4.50 4.30 4.80   HEMOGLOBIN g/dL 9.3* 8.6* 9.3* 9.3* 9.1*   PLATELETS 10*3/mm3 360 342 344 344 344   ALT (SGPT) U/L  --   --  24 21 18   AST (SGOT) U/L  --   --  35 28 33     No results found for: CKTOTAL, CKMB, CKMBINDEX, TROPONINI, TROPONINT  Estimated Creatinine Clearance: 14 mL/min (A) (by C-G formula based on SCr of 4.81 mg/dL (H)).      Brief Urine Lab Results  (Last result in the past 365 days)      Color   Clarity   Blood   Leuk Est   Nitrite   Protein   CREAT   Urine HCG        03/04/23 2046             68.0         03/04/23 2046 Orange  Comment: Any Substance that causes an abnormal urine color can alter the accuracy of the chemical reactions.   Turbid   Large (3+)   Large (3+)   Negative   100 mg/dL (2+)               WEIGHTS:     Wt Readings from Last 1 Encounters:   03/08/23 0637 74.8 kg (165 lb)       amiodarone, 200 mg, Oral, TID  atorvastatin, 40 mg, Oral, Nightly  budesonide, 0.5 mg, Nebulization, BID - RT  bumetanide, 1 mg, Oral, Daily  [START ON 3/9/2023] cefTRIAXone, 1 g, Intravenous, Q24H  clopidogrel, 75 mg, Oral, Daily  folic acid, 1 mg, Oral, Daily  ipratropium-albuterol, 3 mL, Nebulization, 4x Daily - RT  midodrine, 5 mg, Oral, TID AC  multivitamin with minerals, 1 tablet, Oral, Daily  pantoprazole, 40 mg, Oral, Daily  sodium chloride, 10 mL, Intravenous, Q12H  tamsulosin, 0.4 mg, Oral, Daily  thiamine, 100 mg, Oral, Daily           Assessment & Plan       1.  Acute kidney injury  Patient had underlying chronic kidney disease, most likely due to diabetic glomerulosclerosis but his renal function had worsened recently.  He was admitted with fluid overload and his renal function has worsened with diuresis.  Patient was discharged yesterday but brought back due to  hypoglycemic episode.  Creatinine 4.8 Mg/DL this morning  2.  Hypotension  3.  Urinary retention.  Diaz cath in place  4.  Hypoglycemia.  5.  CHF.  Patient has underlying diastolic dysfunction and valvular heart disease/aortic stenosis  6.  Hyperkalemia    Plan:  Hold diuretics and provide gentle IV hydration  Discontinue Flomax as blood pressure has been low.  Increase midodrine dose  Discussed with patient again and informed that he might need to start dialysis.  Patient has been hesitant in recent past  Hold Bumex also  I will order Lokelma for hyperkalemia repeat potassium later today  Surveillance labs      Emanuel Beauchamp MD  03/08/23  13:09 EST

## 2023-03-08 NOTE — PLAN OF CARE
Problem: Adult Inpatient Plan of Care  Goal: Absence of Hospital-Acquired Illness or Injury  Intervention: Identify and Manage Fall Risk  Recent Flowsheet Documentation  Taken 3/8/2023 1300 by Erum Victoria RN  Safety Promotion/Fall Prevention: safety round/check completed  Taken 3/8/2023 1255 by Erum Victoria RN  Safety Promotion/Fall Prevention:   safety round/check completed   room organization consistent   lighting adjusted   clutter free environment maintained  Intervention: Prevent Skin Injury  Recent Flowsheet Documentation  Taken 3/8/2023 1255 by Erum Victoria RN  Body Position:   supine   lower extremity elevated  Intervention: Prevent and Manage VTE (Venous Thromboembolism) Risk  Recent Flowsheet Documentation  Taken 3/8/2023 1255 by Erum Victoria RN  Activity Management:   activity adjusted per tolerance   bedrest  Goal: Optimal Comfort and Wellbeing  Intervention: Monitor Pain and Promote Comfort  Recent Flowsheet Documentation  Taken 3/8/2023 1255 by Erum Victoria RN  Pain Management Interventions:   care clustered   position adjusted  Intervention: Provide Person-Centered Care  Recent Flowsheet Documentation  Taken 3/8/2023 1255 by Erum Victoria RN  Trust Relationship/Rapport:   care explained   questions answered   thoughts/feelings acknowledged  Goal: Readiness for Transition of Care  Intervention: Mutually Develop Transition Plan  Recent Flowsheet Documentation  Taken 3/8/2023 1305 by Erum Victoria RN  Equipment Currently Used at Home: walker, standard  Transportation Anticipated: health plan transportation  Patient/Family Anticipated Services at Transition: skilled nursing  Patient/Family Anticipates Transition to: long-term care facility     Problem: Fall Injury Risk  Goal: Absence of Fall and Fall-Related Injury  Intervention: Identify and Manage Contributors  Recent Flowsheet Documentation  Taken 3/8/2023 1300 by Erum Victoria RN  Medication  Review/Management: medications reviewed  Taken 3/8/2023 1255 by Erum Victoria RN  Medication Review/Management: medications reviewed  Intervention: Promote Injury-Free Environment  Recent Flowsheet Documentation  Taken 3/8/2023 1300 by Erum Victoria RN  Safety Promotion/Fall Prevention: safety round/check completed  Taken 3/8/2023 1255 by Erum Victoria RN  Safety Promotion/Fall Prevention:   safety round/check completed   room organization consistent   lighting adjusted   clutter free environment maintained     Problem: Pain Acute  Goal: Acceptable Pain Control and Functional Ability  Intervention: Prevent or Manage Pain  Recent Flowsheet Documentation  Taken 3/8/2023 1300 by Erum Victoria RN  Medication Review/Management: medications reviewed  Taken 3/8/2023 1255 by Erum Victoria RN  Medication Review/Management: medications reviewed  Intervention: Develop Pain Management Plan  Recent Flowsheet Documentation  Taken 3/8/2023 1255 by Erum Victoria RN  Pain Management Interventions:   care clustered   position adjusted     Problem: Breathing Pattern Ineffective  Goal: Effective Breathing Pattern  Intervention: Promote Improved Breathing Pattern  Recent Flowsheet Documentation  Taken 3/8/2023 1255 by Erum Victoria RN  Head of Bed (HOB) Positioning: HOB at 20-30 degrees     Problem: Skin Injury Risk Increased  Goal: Skin Health and Integrity  Intervention: Optimize Skin Protection  Recent Flowsheet Documentation  Taken 3/8/2023 1255 by Erum Victoria RN  Head of Bed (HOB) Positioning: HOB at 20-30 degrees   Goal Outcome Evaluation:      Pt admitted with AMS d/t hypoglycemia. Pt now alert and oriented to person, place, and situation. Resting at this time. VSS. Temperature decreased, bear hugger applied.

## 2023-03-08 NOTE — NURSING NOTE
WOCN note:    77 yr old male admitted 3/8/23 with hypoglycemia. WOCN consult received for left heel wound.     Patient recently discharged from this facility. Records reviewed. Patient presents with a 4x4cm partial thickness stage 2 pressure injury to his left heel. There is a small deep tissue injury in the center of this wound measuring approximately 1.5x1cm. The wound is draining a small amount of serosanguinous exudate. The wound was cleansed with NS and covered with a Vaseline gauze and silicone bordered foam dressing.     Patient also has a partial thickness stage 2 pressure injury to his sacrum measuring 1x0.3cm. There is a sacral bordered foam dressing in place.   There is a deep tissue pressure injury noted to the right heel measuring approximately 1x1cm and a resolving DTI to the left lateral foot.     Recommend wound care as above and pressure injury prevention measures such as foam off loading heel boots bilaterally and frequent turning and repositioning. We will continue to follow as needed.

## 2023-03-08 NOTE — PROGRESS NOTES
Daily Progress Note          Assessment    2 groundglass opacities in the left upper lobe  Differential diagnosis pneumonia versus malignancy  Alcoholism  Diabetes mellitus  Hyperlipidemia  Hypertension        Recommendations:     Patient stable from pulmonary standpoint  Oxygenating well on room air  Antibiotics start p.o. Augmentin for possible aspiration  Repeat CT scan of the chest without contrast in 6 weeks     Thiamine and folate  Currently on Plavix and Eliquis  Amiodarone  Bumex 1 mg every 12                        LOS: 10 days     Subjective     Patient reports mild cough    Objective     Vital signs for last 24 hours:  Vitals:    03/06/23 1747 03/06/23 1945 03/07/23 0421 03/07/23 1155   BP: 107/65 103/63 109/48 126/88   BP Location:  Right arm Right arm Right arm   Patient Position:  Lying Lying Lying   Pulse: 69 70 68 62   Resp:  12 16 14   Temp:  97.4 °F (36.3 °C) 97 °F (36.1 °C) 97.4 °F (36.3 °C)   TempSrc:  Oral Oral Oral   SpO2:  97% 98% 98%   Weight:   75 kg (165 lb 5.5 oz)    Height:           Intake/Output last 3 shifts:  I/O last 3 completed shifts:  In: 720 [P.O.:720]  Out: 700 [Urine:700]  Intake/Output this shift:  No intake/output data recorded.      Radiology  Imaging Results (Last 24 Hours)     ** No results found for the last 24 hours. **          Labs:  Results from last 7 days   Lab Units 03/07/23  0942   WBC 10*3/mm3 5.00   HEMOGLOBIN g/dL 8.6*   HEMATOCRIT % 25.7*   PLATELETS 10*3/mm3 342     Results from last 7 days   Lab Units 03/07/23 0942 03/05/23 2247   SODIUM mmol/L 130* 129*   POTASSIUM mmol/L 4.5 5.2   CHLORIDE mmol/L 95* 93*   CO2 mmol/L 21.0* 20.0*   BUN mg/dL 94* 86*   CREATININE mg/dL 4.55* 4.74*   CALCIUM mg/dL 8.0* 8.2*   BILIRUBIN mg/dL  --  0.2   ALK PHOS U/L  --  111   ALT (SGPT) U/L  --  24   AST (SGOT) U/L  --  35   GLUCOSE mg/dL 180* 115*         Results from last 7 days   Lab Units 03/07/23  0942 03/05/23 2247 03/05/23  1241   ALBUMIN g/dL 2.9* 2.6* 2.9*              Results from last 7 days   Lab Units 03/05/23  2247   MAGNESIUM mg/dL 1.7                   Meds:   SCHEDULE    Infusions  No current facility-administered medications for this encounter.    PRNs      Physical Exam:  General Appearance:  Alert   HEENT:  Normocephalic, without obvious abnormality, Conjunctiva/corneas clear,.   Nares normal, no drainage     Neck:  Supple, symmetrical, trachea midline.   Lungs /Chest wall: Mild bilateral basal rhonchi, respirations unlabored, symmetrical wall movement.     Heart:  Regular rate and rhythm, S1 S2 normal  Abdomen: Soft, non-tender, no masses, no organomegaly.    Extremities: No edema, no clubbing or cyanosis     ROS  Constitutional: Negative for chills, fever and positive for malaise/fatigue.   HENT: Negative.    Eyes: Negative.    Cardiovascular: Negative.    Respiratory: Positive for cough and shortness of breath.    Skin: Negative.    Musculoskeletal: Negative.    Gastrointestinal: Negative.    Genitourinary: Negative.    Neurological: Negative.    Psychiatric/Behavioral: Negative.      I reviewed the recent clinical results    Much of this encounter note is an electronic transcription/translation of spoken language to printed text using Dragon Software which might include inadvertent errors in transcription.

## 2023-03-09 NOTE — PLAN OF CARE
Problem: Adult Inpatient Plan of Care  Goal: Plan of Care Review  Outcome: Ongoing, Progressing   Goal Outcome Evaluation:               Pt rested well overnight, VSS. FC intact & patent. Pt had no c/o pain. Pt educated on current plan of care, verbalized understanding.

## 2023-03-09 NOTE — PROGRESS NOTES
[facility] Medicine Services Daily Progress Note    Patient Name: Holger Ibrahim  : 1945  MRN: [id number]  Primary Care Physician:  Arnulfo Monroy MD  Date of admission: 3/8/2023      Subjective      Chief Complaint: Hypoglycemia      Patient Reports no complaints.    ROS   Negative except as mentioned above    Objective      Vitals:   Temp:  [94.8 °F (34.9 °C)-97.6 °F (36.4 °C)] 97.6 °F (36.4 °C)  Heart Rate:  [61-77] 63  Resp:  [10-16] 10  BP: ()/(61-98) 96/61  Flow (L/min):  [2-3] 2    Physical Exam  Constitutional:       General: He is not in acute distress.     Appearance: He is not ill-appearing.   HENT:      Head: Normocephalic.      Mouth/Throat:      Mouth: Mucous membranes are moist.   Eyes:      Extraocular Movements: Extraocular movements intact.      Pupils: Pupils are equal, round, and reactive to light.   Cardiovascular:      Rate and Rhythm: Normal rate and regular rhythm.   Pulmonary:      Effort: Pulmonary effort is normal. No respiratory distress.      Breath sounds: No wheezing.   Abdominal:      General: Bowel sounds are normal. There is no distension.      Palpations: Abdomen is soft.      Tenderness: There is no abdominal tenderness.   Neurological:      General: No focal deficit present.      Mental Status: He is alert and oriented to person, place, and time.             Result Review    Result Review:  I have personally reviewed the results from the time of this admission to 3/9/2023 11:09 EST and agree with these findings:  [x]  Laboratory  []  Microbiology  []  Radiology  []  EKG/Telemetry   []  Cardiology/Vascular   []  Pathology  []  Old records  []  Other:  Most notable findings include:    Wounds (last 24 hours)     LDA Wound     Row Name 23 0800 23 1700       Wound 23 1437 medial coccyx Pressure Injury    Wound - Properties Group Placement Date: 23  - Placement Time: 143  - Present on Hospital  Admission: Y  -SH Orientation: medial  -SH Location: coccyx  -SH Primary Wound Type: Pressure inj  -SH Additional Comments: stage 2  -SH    Pressure Injury Stage 2  -JW 2  -CP 2  -EB    Dressing Appearance dry;intact  -JW intact;dry  -CP dry;intact  -EB    Closure -- Adhesive bandage  -CP Adhesive bandage  -EB    Base -- -- pink  -EB    Retired Wound - Properties Group Placement Date: 02/23/23  -SH Placement Time: 1437  -SH Present on Hospital Admission: Y  -SH Orientation: medial  -SH Location: coccyx  -SH Primary Wound Type: Pressure inj  -SH Additional Comments: stage 2  -SH    Retired Wound - Properties Group Date first assessed: 02/23/23  -SH Time first assessed: 1437  -SH Present on Hospital Admission: Y  -SH Location: coccyx  -SH Primary Wound Type: Pressure inj  -SH Additional Comments: stage 2  -SH       Wound 02/23/23 1715 Left posterior heel    Wound - Properties Group Placement Date: 02/23/23  -BS Placement Time: 1715  -BS Side: Left  -BS Orientation: posterior  -BS Location: heel  -BS    Pressure Injury Stage -- 2  -CP 2  -EB    Closure -- Adhesive bandage  -CP Adhesive bandage  -EB    Drainage Amount -- -- small  -EB    Dressing Care -- dressing reinforced  -CP dressing reinforced  -EB    Retired Wound - Properties Group Placement Date: 02/23/23  -BS Placement Time: 1715  -BS Side: Left  -BS Orientation: posterior  -BS Location: heel  -BS    Retired Wound - Properties Group Date first assessed: 02/23/23  -BS Time first assessed: 1715  -BS Side: Left  -BS Location: heel  -BS       Wound 02/23/23 1712 Right posterior heel Pressure Injury    Wound - Properties Group Placement Date: 02/23/23  -BS Placement Time: 1712  -BS Present on Hospital Admission: Y  -BS Side: Right  -BS Orientation: posterior  -BS Location: heel  -BS Primary Wound Type: Pressure inj  -BS    Pressure Injury Stage -- 2  -CP 2  -EB    Closure Open to air  -JW Open to air  -CP Adhesive bandage  -EB    Drainage Amount none  -JW -- none   -EB    Dressing Care -- -- dressing reinforced  -EB    Retired Wound - Properties Group Placement Date: 02/23/23  -BS Placement Time: 1712  -BS Present on Hospital Admission: Y  -BS Side: Right  -BS Orientation: posterior  -BS Location: heel  -BS Primary Wound Type: Pressure inj  -BS    Retired Wound - Properties Group Date first assessed: 02/23/23  -BS Time first assessed: 1712  -BS Present on Hospital Admission: Y  -BS Side: Right  -BS Location: heel  -BS Primary Wound Type: Pressure inj  -BS    Row Name 03/08/23 1656 03/08/23 1654 03/08/23 1649       Wound 02/23/23 1437 medial coccyx Pressure Injury    Wound - Properties Group Placement Date: 02/23/23  -SH Placement Time: 1437  -SH Present on Hospital Admission: Y  -SH Orientation: medial  -SH Location: coccyx  -SH Primary Wound Type: Pressure inj  -SH Additional Comments: stage 2  -SH    Wound Image -- -- Images linked: 1  -EB    Retired Wound - Properties Group Placement Date: 02/23/23  -SH Placement Time: 1437  -SH Present on Hospital Admission: Y  -SH Orientation: medial  -SH Location: coccyx  -SH Primary Wound Type: Pressure inj  -SH Additional Comments: stage 2  -SH    Retired Wound - Properties Group Date first assessed: 02/23/23  -SH Time first assessed: 1437  -SH Present on Hospital Admission: Y  -SH Location: coccyx  -SH Primary Wound Type: Pressure inj  -SH Additional Comments: stage 2  -SH       Wound 02/23/23 1715 Left posterior heel    Wound - Properties Group Placement Date: 02/23/23  -BS Placement Time: 1715 -BS Side: Left  -BS Orientation: posterior  -BS Location: heel  -BS    Wound Image -- Images linked: 1  -EB --    Retired Wound - Properties Group Placement Date: 02/23/23  -BS Placement Time: 1715  -BS Side: Left  -BS Orientation: posterior  -BS Location: heel  -BS    Retired Wound - Properties Group Date first assessed: 02/23/23  -BS Time first assessed: 1715  -BS Side: Left  -BS Location: heel  -BS       Wound 02/23/23 1712 Right posterior  heel Pressure Injury    Wound - Properties Group Placement Date: 02/23/23  -BS Placement Time: 1712 -BS Present on Hospital Admission: Y  -BS Side: Right  -BS Orientation: posterior  -BS Location: heel  -BS Primary Wound Type: Pressure inj  -BS    Wound Image Images linked: 1  -EB -- --    Retired Wound - Properties Group Placement Date: 02/23/23  -BS Placement Time: 1712 -BS Present on Hospital Admission: Y  -BS Side: Right  -BS Orientation: posterior  -BS Location: heel  -BS Primary Wound Type: Pressure inj  -BS    Retired Wound - Properties Group Date first assessed: 02/23/23  -BS Time first assessed: 1712 -BS Present on Hospital Admission: Y  -BS Side: Right  -BS Location: heel  -BS Primary Wound Type: Pressure inj  -BS    Row Name 03/08/23 1600 03/08/23 1255 03/08/23 12:00:43       Wound 02/23/23 1437 medial coccyx Pressure Injury    Wound - Properties Group Placement Date: 02/23/23  -SH Placement Time: 1437  -SH Present on Hospital Admission: Y  -SH Orientation: medial  -SH Location: coccyx  -SH Primary Wound Type: Pressure inj  -SH Additional Comments: stage 2  -SH    Pressure Injury Stage -- 2  -CC 1  -ME    Dressing Appearance -- dry;intact  -CC --    Closure Adhesive bandage  -CC Adhesive bandage  -CC --    Base pink  -CC pink  -CC --    Edges open  -CC open  -CC --    Dressing Care -- silicone;elastic bandage;dressing reinforced  -CC dressing applied  -ME    Retired Wound - Properties Group Placement Date: 02/23/23  -SH Placement Time: 1437  -SH Present on Hospital Admission: Y  -SH Orientation: medial  -SH Location: coccyx  -SH Primary Wound Type: Pressure inj  -SH Additional Comments: stage 2  -SH    Retired Wound - Properties Group Date first assessed: 02/23/23  -SH Time first assessed: 1437  -SH Present on Hospital Admission: Y  -SH Location: coccyx  -SH Primary Wound Type: Pressure inj  -SH Additional Comments: stage 2  -SH       Wound 02/23/23 1715 Left posterior heel    Wound - Properties Group  Placement Date: 02/23/23 -BS Placement Time: 1715 -BS Side: Left  -BS Orientation: posterior  -BS Location: heel  -BS    Retired Wound - Properties Group Placement Date: 02/23/23 -BS Placement Time: 1715 -BS Side: Left  -BS Orientation: posterior  -BS Location: heel  -BS    Retired Wound - Properties Group Date first assessed: 02/23/23 -BS Time first assessed: 1715 -BS Side: Left  -BS Location: heel  -BS       Wound 02/23/23 1712 Right posterior heel Pressure Injury    Wound - Properties Group Placement Date: 02/23/23 -BS Placement Time: 1712 -BS Present on Hospital Admission: Y  -BS Side: Right  -BS Orientation: posterior  -BS Location: heel  -BS Primary Wound Type: Pressure inj  -BS    Retired Wound - Properties Group Placement Date: 02/23/23 -BS Placement Time: 1712 -BS Present on Hospital Admission: Y  -BS Side: Right  -BS Orientation: posterior  -BS Location: heel  -BS Primary Wound Type: Pressure inj  -BS    Retired Wound - Properties Group Date first assessed: 02/23/23 -BS Time first assessed: 1712 -BS Present on Hospital Admission: Y  -BS Side: Right  -BS Location: heel  -BS Primary Wound Type: Pressure inj  -BS          User Key  (r) = Recorded By, (t) = Taken By, (c) = Cosigned By    Initials Name Provider Type    Estela Harris LPN Licensed Nurse    Jessica Moses RN Registered Nurse    Madiha Jackman RN Registered Nurse    Jose Hernandez RN Registered Nurse    Yudi Mireles RN Registered Nurse    Erum Patel RN Registered Nurse    Ledy Saul LPN Licensed Nurse                  Assessment & Plan      Brief Patient Summary:  Holger Ibrahim is a 77 y.o. male who       amiodarone, 200 mg, Oral, TID  atorvastatin, 40 mg, Oral, Nightly  budesonide, 0.5 mg, Nebulization, BID - RT  cefTRIAXone, 1 g, Intravenous, Q24H  clopidogrel, 75 mg, Oral, Daily  cosyntropin, 0.25 mg, Intravenous, Once  folic acid, 1 mg, Oral, Daily  ipratropium-albuterol, 3 mL,  Nebulization, 4x Daily - RT  midodrine, 10 mg, Oral, TID AC  multivitamin with minerals, 1 tablet, Oral, Daily  pantoprazole, 40 mg, Oral, Daily  sodium chloride, 10 mL, Intravenous, Q12H  thiamine, 100 mg, Oral, Daily       sodium chloride, 75 mL/hr, Last Rate: 75 mL/hr (03/09/23 0652)         Active Hospital Problems:  Active Hospital Problems    Diagnosis    • **Hypoglycemia      Plan:     Hypoglycemia  -Hypoglycemic on admission, resolved  -Endocrinology following appreciate their recommendations    T2DM  -Endocrinology following, appreciate their recommendations    SHAHRAM on CKD  -Patient with history of CKD, chronically progressing  -Nephrology following  -Continue Bumex    Community-acquired pneumonia  -Continue ceftriaxone  -Continue DuoNebs and Pulmicort  -Room air    HFpEF  Aortic stenosis  -Euvolemic  -EF showing 33%     Atrial fibrillation  -Continue amiodarone and Eliquis    DVT prophylaxis:  No DVT prophylaxis order currently exists.    CODE STATUS:    Level Of Support Discussed With: Patient  Code Status (Patient has no pulse and is not breathing): No CPR (Do Not Attempt to Resuscitate)  Medical Interventions (Patient has pulse or is breathing): Full Support      Disposition:  I expect patient to be discharged in the next 2 to 3 days.        Electronically signed by Marky Funk MD, 03/09/23, 11:09 EST.  [employer] [facility] Hospitalist Team

## 2023-03-09 NOTE — DISCHARGE PLACEMENT REQUEST
"Keenan Ibrahim (77 y.o. Male)     Date of Birth   1945    Social Security Number       Address   [address] [place] [address] [address] [facility] [place] [address]    Home Phone   [phone]    MRN   [id number]       Hoahaoism   None    Marital Status                               Admission Date   3/8/23    Admission Type   Emergency    Admitting Provider   Marky Funk MD    Attending Provider   Marky Funk MD    Department, Room/Bed   [facility] 2C MEDICAL INPATIENT, 240/1       Discharge Date       Discharge Disposition       Discharge Destination                               Attending Provider: Marky Funk MD    Allergies: Contrast Dye (Echo Or Unknown Ct/mr)    Isolation: None   Infection: None   Code Status: No CPR    Ht: 177.8 cm (70\")   Wt: 76.9 kg (169 lb 8.5 oz)    Admission Cmt: None   Principal Problem: Hypoglycemia [E16.2]                 Active Insurance as of 3/8/2023     Primary Coverage     Payor Plan Insurance Group Employer/Plan Group    MEDICARE MEDICARE A & B      Payor Plan Address Payor Plan Phone Number Payor Plan Fax Number Effective Dates    [address] [phone]  10/1/2012 - None Entered    [facility] [id number]       Subscriber Name Subscriber Birth Date Member ID       KEENAN IBRAHIM 1945 [id number]           Secondary Coverage     Payor Plan Insurance Group Employer/Plan Group    [place]      Payor Plan Address Payor Plan Phone Number Payor Plan Fax Number Effective Dates    [address]   7/8/2019 - None Entered    [place] [address]       Subscriber Name Subscriber Birth Date Member ID       KEENAN IBRAHIM 1945 [id number]                 Emergency Contacts      (Rel.) Home Phone Work Phone Mobile Phone    Manasa Gonzales (Daughter in law) (Daughter) -- -- [phone]    ChristianGerard (Son) -- -- [phone]          "

## 2023-03-09 NOTE — PROGRESS NOTES
"[facility] PROGRESS NOTE     LOS: 1 day    Patient Care Team:  Arnulfo Monroy MD as PCP - General (Internal Medicine)  Kym Aparicio MD as Consulting Physician (Endocrinology)      Subjective     Patient resting comfortably.  Denies any chest pain, shortness of breath, nausea or vomiting.  Diaz cath in place.  Good urine output    Objective     Vital Sign Min/Max for last 24 hours  Temp:  [94.8 °F (34.9 °C)-97.6 °F (36.4 °C)] 97.6 °F (36.4 °C)  Heart Rate:  [61-77] 63  Resp:  [10-16] 10  BP: ()/(61-98) 96/61                       Flowsheet Rows    Flowsheet Row First Filed Value   Admission Height 177.8 cm (70\") Documented at 03/08/2023 0637   Admission Weight 74.8 kg (165 lb) Documented at 03/08/2023 0637          I/O this shift:  In: 100 [IV Piggyback:100]  Out: -   I/O last 3 completed shifts:  In: 1459 [P.O.:360; I.V.:999; IV Piggyback:100]  Out: 1200 [Urine:1200]    Physical Exam:  Physical Exam    General Appearance: Chronically ill-appearing, NAD  Skin: warm and dry  HEENT: oral mucosa normal, nonicteric sclera  Neck: supple, no JVD  Lungs: Few scattered wheezes bilaterally  Heart: RRR, normal S1 and S2  Abdomen: soft, nontender, nondistended  : no palpable bladder  Extremities: Trace bilateral lower extreme edema.  Scrotal edema+  Neuro: normal speech and mental status        LABS:  Lab Results   Component Value Date    CALCIUM 8.3 (L) 03/08/2023    PHOS 7.2 (H) 03/07/2023     Results from last 7 days   Lab Units 03/08/23  1930 03/08/23  0654 03/07/23  0942 03/05/23  2247 03/05/23  1241 03/04/23  0618   MAGNESIUM mg/dL  --   --   --  1.7 1.6 1.6   SODIUM mmol/L  --  131* 130* 129* 131* 132*   POTASSIUM mmol/L 5.2 5.7* 4.5 5.2 5.0 4.6   CHLORIDE mmol/L  --  95* 95* 93* 97* 96*   CO2 mmol/L  --  19.0* 21.0* 20.0* 22.0 23.0   BUN mg/dL  --  98* 94* 86* 81* 80*   CREATININE mg/dL  --  4.81* 4.55* 4.74* 4.27* 4.06*   GLUCOSE mg/dL  --  83 180* 115* 185* 124*   CALCIUM mg/dL  --  8.3* 8.0* 8.2* " 8.0* 8.3*   WBC 10*3/mm3  --  4.20 5.00 4.50 4.30 4.80   HEMOGLOBIN g/dL  --  9.3* 8.6* 9.3* 9.3* 9.1*   PLATELETS 10*3/mm3  --  360 342 344 344 344   ALT (SGPT) U/L  --   --   --  24 21 18   AST (SGOT) U/L  --   --   --  35 28 33     No results found for: CKTOTAL, CKMB, CKMBINDEX, TROPONINI, TROPONINT  Estimated Creatinine Clearance: 14.4 mL/min (A) (by C-G formula based on SCr of 4.81 mg/dL (H)).      Brief Urine Lab Results  (Last result in the past 365 days)      Color   Clarity   Blood   Leuk Est   Nitrite   Protein   CREAT   Urine HCG        03/04/23 2046             68.0         03/04/23 2046 Orange  Comment: Any Substance that causes an abnormal urine color can alter the accuracy of the chemical reactions.   Turbid   Large (3+)   Large (3+)   Negative   100 mg/dL (2+)               WEIGHTS:     Wt Readings from Last 1 Encounters:   03/09/23 0315 76.9 kg (169 lb 8.5 oz)   03/08/23 0637 74.8 kg (165 lb)       amiodarone, 200 mg, Oral, TID  atorvastatin, 40 mg, Oral, Nightly  budesonide, 0.5 mg, Nebulization, BID - RT  cefTRIAXone, 1 g, Intravenous, Q24H  clopidogrel, 75 mg, Oral, Daily  cosyntropin, 0.25 mg, Intravenous, Once  folic acid, 1 mg, Oral, Daily  ipratropium-albuterol, 3 mL, Nebulization, 4x Daily - RT  midodrine, 10 mg, Oral, TID AC  multivitamin with minerals, 1 tablet, Oral, Daily  pantoprazole, 40 mg, Oral, Daily  sodium chloride, 10 mL, Intravenous, Q12H  thiamine, 100 mg, Oral, Daily      sodium chloride, 75 mL/hr, Last Rate: 75 mL/hr (03/09/23 0652)        Assessment & Plan       1.  Acute kidney injury  Patient had underlying chronic kidney disease, most likely due to diabetic glomerulosclerosis but his renal function had worsened recently.  He was admitted with fluid overload and his renal function has worsened with diuresis.His creatinine was 4.8 mg/DL this morning.  He was started on IV fluids and diuretics were held.  Labs pending from this morning  2.  Hypotension.  ACTH stimulation  test ordered by endocrinology  3.  Urinary retention.  Diaz cath in place  4.  Hypoglycemia.  5.  CHF.  Patient has underlying diastolic dysfunction and valvular heart disease/aortic stenosis  6.  Hyperkalemia.  Improved with medical management    Plan:  Continue IV fluids.  Keep off diuretics and Flomax  Continue oral midodrine  Follow-up repeat labs.  Surveillance labs      Emanuel Beauchamp MD  03/09/23  10:38 EST

## 2023-03-09 NOTE — CASE MANAGEMENT/SOCIAL WORK
Consulted to see patient for deep tissue chest wound. Attempted to see patient, proning at this time. Will attempt to see again later   Discharge Planning Assessment   Kvng     Patient Name: Holger Ibrahim  MRN: [id number]  Today's Date: 3/9/2023    Admit Date: 3/8/2023    Plan: Return to [facility]. No pre-cert or PASRR required.   Discharge Needs Assessment     Row Name 03/09/23 1410       Living Environment    People in Home facility resident  [facility]    Current Living Arrangements extended care facility    Primary Care Provided by other (see comments)  Facility staff    Provides Primary Care For no one    Able to Return to Prior Arrangements yes       Resource/Environmental Concerns    Resource/Environmental Concerns none    Transportation Concerns none       Transition Planning    Patient/Family Anticipates Transition to long-term care facility    Patient/Family Anticipated Services at Transition none    Transportation Anticipated other (see comments)  [facility]       Discharge Needs Assessment    Equipment Currently Used at Home wheelchair;walker, rolling    Concerns to be Addressed discharge planning    Anticipated Changes Related to Illness none    Equipment Needed After Discharge none               Discharge Plan     Row Name 03/09/23 1411       Plan    Plan Return to [facility]. No pre-cert or PASRR required.    Patient/Family in Agreement with Plan yes    Plan Comments CM spoke to patient's DIL Manasa over the phone. Patient is current LTC resident at [facility]. Per liaison Yolanda, patient can return. Per DIL, wants patient to return. Patient performs ADL with maximum assistance by facility staff. Patient will need transportation provided by [facility]. DIL denies financial assistance for medication and/or food. CM completed RA. D/C barriers: elevated BUN/Cr, IV abx, nephro, endocrine following.              Continued Care and Services - Admitted Since 3/8/2023     Destination Coordination complete.    Service Provider Request Status Selected Services Address Phone Fax Patient Preferred    [facility]  [facility] [place]  [facility] [address] [place] [address] [place] [address] [phone] --                    Demographic Summary     Row Name 03/09/23 1410       General Information    Admission Type inpatient    Arrived From emergency department    Required Notices Provided Important Message from Medicare    Referral Source admission list    Reason for Consult discharge planning    Preferred Language English               Functional Status     Row Name 03/09/23 1410       Functional Status    Usual Activity Tolerance moderate    Current Activity Tolerance moderate       Functional Status, IADL    Medications assistive equipment and person    Meal Preparation assistive equipment and person    Housekeeping assistive equipment and person    Laundry assistive equipment and person    Shopping assistive equipment and person                    Patient Forms     Row Name 03/09/23 1406       Patient Forms    Important Message from Medicare (IMM) Delivered  IMM reviewed with DIL, verbalized understanding, declined copy    Delivered to Support person    Method of delivery Telephone              Phone communication or documentation only - no physical contact with patient or family.      Janis Aguila RN

## 2023-03-09 NOTE — PROGRESS NOTES
Daily Progress Note    Patient Care Team:  Arnulfo Monroy MD as PCP - General (Internal Medicine)  Kym Aparicio MD as Consulting Physician (Endocrinology)    Chief Complaint: Follow-up hypoglycemia    HPI: Patient seen and examined today.  Clinically doing well.  Blood sugars are stable to high side.  No low blood sugars noted.  Eating well.  ACTH stim test not done yet.    ROS:   Constitutional:  Denies fatigue, tiredness.    Respiratory: denies cough, shortness of breath.   Cardiovascular:  denies chest pain, edema   GI:  Denies abdominal pain, nausea, vomiting.         Vitals:    03/09/23 1148   BP: 107/66   Pulse: 68   Resp:    Temp:    SpO2:      Body mass index is 24.33 kg/m².    Physical Exam:    CV: RRR  LUNG: CTA  PSYCH: Awake and coherent.      Results Review:     I reviewed the patient's new clinical results.    Glucose   Date Value Ref Range Status   03/09/2023 233 (H) 65 - 99 mg/dL Final     Sodium   Date Value Ref Range Status   03/09/2023 131 (L) 136 - 145 mmol/L Final     Potassium   Date Value Ref Range Status   03/09/2023 4.9 3.5 - 5.2 mmol/L Final     CO2   Date Value Ref Range Status   03/09/2023 18.0 (L) 22.0 - 29.0 mmol/L Final     Chloride   Date Value Ref Range Status   03/09/2023 95 (L) 98 - 107 mmol/L Final     Anion Gap   Date Value Ref Range Status   03/09/2023 18.0 (H) 5.0 - 15.0 mmol/L Final     Creatinine   Date Value Ref Range Status   03/09/2023 4.98 (H) 0.76 - 1.27 mg/dL Final     BUN   Date Value Ref Range Status   03/09/2023 97 (H) 8 - 23 mg/dL Final     BUN/Creatinine Ratio   Date Value Ref Range Status   03/09/2023 19.5 7.0 - 25.0 Final     Calcium   Date Value Ref Range Status   03/09/2023 8.0 (L) 8.6 - 10.5 mg/dL Final     Albumin   Date Value Ref Range Status   03/09/2023 2.6 (L) 3.5 - 5.2 g/dL Final     Phosphorus   Date Value Ref Range Status   03/09/2023 7.4 (H) 2.5 - 4.5 mg/dL Final     Lab Results   Component Value Date    HGBA1C 7.4 (H) 02/23/2023     HGBA1C 7.6 (H) 02/02/2023    HGBA1C 10.4 (H) 12/13/2022       Results from last 7 days   Lab Units 03/09/23  0708 03/08/23  2212 03/08/23  1752 03/08/23  1552 03/08/23  1247 03/08/23  1128   GLUCOSE mg/dL 177* 170* 125* 140* 127* 134*        Latest Reference Range & Units 02/23/23 04:59 02/23/23 12:25 03/09/23 10:45   TSH Baseline 0.270 - 4.200 uIU/mL 6.470 (H) 5.320 (H) 5.350 (H)   Free T4 0.93 - 1.70 ng/dL  1.33 1.21   Thyroid Peroxidase Antibody 0 - 34 IU/mL  11    (H): Data is abnormally high      Medication Review: Reviewed.     amiodarone, 200 mg, Oral, TID  atorvastatin, 40 mg, Oral, Nightly  budesonide, 0.5 mg, Nebulization, BID - RT  cefTRIAXone, 1 g, Intravenous, Q24H  clopidogrel, 75 mg, Oral, Daily  cosyntropin, 0.25 mg, Intravenous, Once  cosyntropin, 0.25 mg, Intravenous, Once  folic acid, 1 mg, Oral, Daily  ipratropium-albuterol, 3 mL, Nebulization, 4x Daily - RT  midodrine, 10 mg, Oral, TID AC  multivitamin with minerals, 1 tablet, Oral, Daily  pantoprazole, 40 mg, Oral, Daily  sodium chloride, 10 mL, Intravenous, Q12H  thiamine, 100 mg, Oral, Daily              Assessment and plan:  Hypoglycemia: Resolved, follow blood sugars and avoid any antidiabetic agents at this time.  ACTH stim test is still not done, will follow levels when available.    Hypothyroidism: TSH mild persistent high, will add levothyroxine 25 mcg p.o. daily.    Mekhi Vidal MD. FACE

## 2023-03-09 NOTE — CASE MANAGEMENT/SOCIAL WORK
Case Management Readmission Assessment Note    Case Management Readmission Assessment (all recorded)     Readmission Interview     Row Name 03/09/23 1409             Readmission Indications    Is this hospitalization related to the prior hospital diagnosis? Yes      What was the reason you were admitted? Hypoglycemia      Row Name 03/09/23 1409             Recommendation for rehospitalization    Did you speak with your physician prior to coming to the hospital Yes   MD      Did you seek care elsewhere prior to coming to the hospital? No      [facility] Name 03/09/23 1409             Follow up appointment    Do you have a PCP? Yes      Did you have an appointment with PCP/specialist after hospitalization within 7 days? No      [facility] Name 03/09/23 1409             Medications    Did you have newly prescribed medications at discharge? Yes      Did you understand the reasons for your medications at discharge and how to take them? Yes      Did you understand the side effects of your medications? Yes      Are you taking all of you prescribed medications? Yes      [facility] Name 03/09/23 1409             Discharge Instructions    Did you understand your discharge instructions? Yes      Did your family/caregiver hear your instructions? Yes      Were you told to eat a special diet? Yes  Diabetic      Did you adhere to the diet? Yes      Were you given a number of someone to call if you had questions or concerns? Yes      [facility] Name 03/09/23 1409             Index discharge location/services    Where did you go upon discharge? Other (comment)  [facility]      Do you have supportive family or friends in the home? Yes      [facility] Name 03/09/23 1409             Discharge Readiness    On a scale of 1-5 (5 being well prepared), how ready were you for discharge 3

## 2023-03-10 NOTE — PROGRESS NOTES
Daily Progress Note    Patient Care Team:  Arnulfo Monroy MD as PCP - General (Internal Medicine)  Kym Aparicio MD as Consulting Physician (Endocrinology)    Chief Complaint: Follow-up type 2 diabetes    HPI: Patient seen and examined today.  Blood sugar log reviewed, does have some high blood sugars.  Overall doing well.    ROS:   Constitutional:  Denies fatigue, tiredness.    Respiratory: denies cough, shortness of breath.   Cardiovascular:  denies chest pain, edema   GI:  Denies abdominal pain, nausea, vomiting.         Vitals:    03/10/23 1211   BP: 122/80   Pulse: 67   Resp:    Temp:    SpO2:      Body mass index is 24.83 kg/m².    Physical Exam:  GEN: NAD, conversant  PSYCH: Awake and coherent      Results Review:     I reviewed the patient's new clinical results.    Glucose   Date Value Ref Range Status   03/10/2023 198 (H) 65 - 99 mg/dL Final     Sodium   Date Value Ref Range Status   03/10/2023 135 (L) 136 - 145 mmol/L Final     Potassium   Date Value Ref Range Status   03/10/2023 5.0 3.5 - 5.2 mmol/L Final     CO2   Date Value Ref Range Status   03/10/2023 19.0 (L) 22.0 - 29.0 mmol/L Final     Chloride   Date Value Ref Range Status   03/10/2023 99 98 - 107 mmol/L Final     Anion Gap   Date Value Ref Range Status   03/10/2023 17.0 (H) 5.0 - 15.0 mmol/L Final     Creatinine   Date Value Ref Range Status   03/10/2023 5.00 (H) 0.76 - 1.27 mg/dL Final     BUN   Date Value Ref Range Status   03/10/2023 98 (H) 8 - 23 mg/dL Final     BUN/Creatinine Ratio   Date Value Ref Range Status   03/10/2023 19.6 7.0 - 25.0 Final     Calcium   Date Value Ref Range Status   03/10/2023 8.0 (L) 8.6 - 10.5 mg/dL Final     Albumin   Date Value Ref Range Status   03/10/2023 2.8 (L) 3.5 - 5.2 g/dL Final     Phosphorus   Date Value Ref Range Status   03/10/2023 8.0 (H) 2.5 - 4.5 mg/dL Final     Lab Results   Component Value Date    HGBA1C 7.4 (H) 02/23/2023    HGBA1C 7.6 (H) 02/02/2023    HGBA1C 10.4 (H) 12/13/2022      No results found for: GLUF, MICROALBUR  Results from last 7 days   Lab Units 03/10/23  1131 03/10/23  0711 03/09/23  2104 03/09/23  1616 03/09/23  1320 03/09/23  0708   GLUCOSE mg/dL 230* 208* 255* 214* 274* 177*             Medication Review: Reviewed.     amiodarone, 200 mg, Oral, TID  atorvastatin, 40 mg, Oral, Nightly  budesonide, 0.5 mg, Nebulization, BID - RT  cefTRIAXone, 1 g, Intravenous, Q24H  clopidogrel, 75 mg, Oral, Daily  [START ON 3/11/2023] cosyntropin, 0.25 mg, Intravenous, Once  folic acid, 1 mg, Oral, Daily  ipratropium-albuterol, 3 mL, Nebulization, 4x Daily - RT  levothyroxine, 25 mcg, Oral, Q AM  linagliptin, 5 mg, Oral, Daily  midodrine, 10 mg, Oral, TID AC  multivitamin with minerals, 1 tablet, Oral, Daily  pantoprazole, 40 mg, Oral, Daily  sodium chloride, 10 mL, Intravenous, Q12H  thiamine, 100 mg, Oral, Daily              Assessment and plan:  Hypoglycemia: Resolved.    Diabetes mellitus type 2 with hyperglycemia: Uncontrolled, on Tradjenta.     Hypothyroidism: On levothyroxine 25 mcg p.o. daily.    Mekhi Vidal MD. FACE

## 2023-03-10 NOTE — PROGRESS NOTES
[facility] Medicine Services Daily Progress Note    Patient Name: Holger Ibrahim  : 1945  MRN: [id number]  Primary Care Physician:  Arnulfo Monroy MD  Date of admission: 3/8/2023      Subjective      Chief Complaint: Hypoglycemia      Patient Reports no complaints.    ROS   Negative except as mentioned above    Objective      Vitals:   Temp:  [97.5 °F (36.4 °C)-98 °F (36.7 °C)] 97.7 °F (36.5 °C)  Heart Rate:  [63-72] 66  Resp:  [10-16] 13  BP: (107-131)/(63-78) 110/70  Flow (L/min):  [2-3] 2    Physical Exam  Constitutional:       General: He is not in acute distress.     Appearance: He is not ill-appearing.   HENT:      Head: Normocephalic.      Mouth/Throat:      Mouth: Mucous membranes are moist.   Eyes:      Extraocular Movements: Extraocular movements intact.      Pupils: Pupils are equal, round, and reactive to light.   Cardiovascular:      Rate and Rhythm: Normal rate and regular rhythm.   Pulmonary:      Effort: Pulmonary effort is normal. No respiratory distress.      Breath sounds: No wheezing.   Abdominal:      General: Bowel sounds are normal. There is no distension.      Palpations: Abdomen is soft.      Tenderness: There is no abdominal tenderness.   Neurological:      General: No focal deficit present.      Mental Status: He is alert and oriented to person, place, and time.             Result Review    Result Review:  I have personally reviewed the results from the time of this admission to 3/10/2023 11:26 EST and agree with these findings:  [x]  Laboratory  []  Microbiology  []  Radiology  []  EKG/Telemetry   []  Cardiology/Vascular   []  Pathology  []  Old records  []  Other:  Most notable findings include:    Wounds (last 24 hours)     LDA Wound     Row Name 23             Wound 23 1437 medial coccyx Pressure Injury    Wound - Properties Group Placement Date: 23  - Placement Time: 1437  - Present on Hospital Admission: Y  -SH Orientation:  medial  -SH Location: coccyx  -SH Primary Wound Type: Pressure inj  -SH Additional Comments: stage 2  -SH    Pressure Injury Stage 2  -CP      Closure Adhesive bandage  -CP      Base dressing in place, unable to visualize  -CP      Retired Wound - Properties Group Placement Date: 02/23/23  -SH Placement Time: 1437 -SH Present on Hospital Admission: Y  -SH Orientation: medial  -SH Location: coccyx  -SH Primary Wound Type: Pressure inj  -SH Additional Comments: stage 2  -SH    Retired Wound - Properties Group Date first assessed: 02/23/23  -SH Time first assessed: 1437  -SH Present on Hospital Admission: Y  -SH Location: coccyx  -SH Primary Wound Type: Pressure inj  -SH Additional Comments: stage 2  -SH       Wound 02/23/23 1715 Left posterior heel    Wound - Properties Group Placement Date: 02/23/23  -BS Placement Time: 1715  -BS Side: Left  -BS Orientation: posterior  -BS Location: heel  -BS    Pressure Injury Stage 2  -CP      Closure Adhesive bandage  -CP      Base dressing in place, unable to visualize  -CP      Retired Wound - Properties Group Placement Date: 02/23/23  -BS Placement Time: 1715  -BS Side: Left  -BS Orientation: posterior  -BS Location: heel  -BS    Retired Wound - Properties Group Date first assessed: 02/23/23  -BS Time first assessed: 1715  -BS Side: Left  -BS Location: heel  -BS       Wound 02/23/23 1712 Right posterior heel Pressure Injury    Wound - Properties Group Placement Date: 02/23/23  -BS Placement Time: 1712  -BS Present on Hospital Admission: Y  -BS Side: Right  -BS Orientation: posterior  -BS Location: heel  -BS Primary Wound Type: Pressure inj  -BS    Pressure Injury Stage 2  -CP      Closure Open to air  -CP      Retired Wound - Properties Group Placement Date: 02/23/23  -BS Placement Time: 1712  -BS Present on Hospital Admission: Y  -BS Side: Right  -BS Orientation: posterior  -BS Location: heel  -BS Primary Wound Type: Pressure inj  -BS    Retired Wound - Properties Group Date  first assessed: 02/23/23  -BS Time first assessed: 1712  -BS Present on Hospital Admission: Y  -BS Side: Right  -BS Location: heel  -BS Primary Wound Type: Pressure inj  -BS          User Key  (r) = Recorded By, (t) = Taken By, (c) = Cosigned By    Initials Name Provider Type    Madiha Jackman, RN Registered Nurse    Yudi Mireles RN Registered Nurse    Ledy Saul LPN Licensed Nurse                  Assessment & Plan      Brief Patient Summary:  Holger Ibrahim is a 77 y.o. male who       amiodarone, 200 mg, Oral, TID  atorvastatin, 40 mg, Oral, Nightly  budesonide, 0.5 mg, Nebulization, BID - RT  cefTRIAXone, 1 g, Intravenous, Q24H  clopidogrel, 75 mg, Oral, Daily  [START ON 3/11/2023] cosyntropin, 0.25 mg, Intravenous, Once  folic acid, 1 mg, Oral, Daily  ipratropium-albuterol, 3 mL, Nebulization, 4x Daily - RT  levothyroxine, 25 mcg, Oral, Q AM  linagliptin, 5 mg, Oral, Daily  midodrine, 10 mg, Oral, TID AC  multivitamin with minerals, 1 tablet, Oral, Daily  pantoprazole, 40 mg, Oral, Daily  sodium chloride, 10 mL, Intravenous, Q12H  thiamine, 100 mg, Oral, Daily             Active Hospital Problems:  Active Hospital Problems    Diagnosis    • **Hypoglycemia      Plan:     Hypoglycemia  -Hypoglycemic on admission, resolved  -Endocrinology following appreciate their recommendations  -f/u ACTH stim test on tunneled catheter placed    T2DM  -Endocrinology following, appreciate their recommendations    SHAHRAM on CKD  -progressively worsening, plan to initiate HD  -planned for tunneled catheter placement  -Patient with history of CKD, chronically progressing  -Nephrology following    Community-acquired pneumonia  -Continue ceftriaxone  -Continue DuoNebs and Pulmicort  -Room air    HFpEF  Aortic stenosis  -Euvolemic  -EF showing 33%     Atrial fibrillation  -Continue amiodarone and Eliquis    DVT prophylaxis:  No DVT prophylaxis order currently exists.    CODE STATUS:    Level Of Support Discussed With:  Patient  Code Status (Patient has no pulse and is not breathing): No CPR (Do Not Attempt to Resuscitate)  Medical Interventions (Patient has pulse or is breathing): Full Support      Disposition:  I expect patient to be discharged in the next 2 to 3 days.        Electronically signed by Marky Funk MD, 03/10/23, 11:26 EST.  [facility]ist Team

## 2023-03-10 NOTE — POST-PROCEDURE NOTE
IR POST OP NOTE    Procedure:Tunneleld HD catheter placementt      Pre Op DX:ESRD      Post Op DX:same      Anesthesia: Local, CS      Findings:See dictation      Complications:None immediate.       Provider Signature: Dr. Troy Sanchez

## 2023-03-10 NOTE — CONSULTS
Nutrition Services  Patient Name: Holger Ibrahim  YOB: 1945  MRN: [id number]  Admission date: 3/8/2023    Add low phosphorus and low potassium modifications to diet order when diet resumes.    Start ONS when diet resumes:  Novasource Renal q daily provides (475 kcals, 22 g protein if consumed)   Magic Cup q daily (290 kcals, 9 g protein if consumed)     PPE Documentation        PPE Worn By Provider mask, gloves, eye protection and gown   PPE Worn By Patient  None     CLINICAL NUTRITION ASSESSMENT      Reason for Assessment 3/10: Wound, skin concerns      H&P      Past Medical History:   Diagnosis Date   • Alcoholism (HCC)     hx of   • DM2 (diabetes mellitus, type 2) (HCC)    • Hyperlipidemia    • Hypertension    • PVD (peripheral vascular disease) ([facility])     PTA 2017       Past Surgical History:   Procedure Laterality Date   • APPENDECTOMY     • CARDIAC CATHETERIZATION N/A 11/5/2019    Procedure: PERIPHERAL ANGIOGRAPHY, Rt popliteal PTA;  Surgeon: Jonny Ferguson MD;  Location: [facility] CATH INVASIVE LOCATION;  Service: Cardiovascular   • EYE SURGERY Right    • HERNIA REPAIR     • LEG SURGERY  01/2020   • POPLITEAL ARTERY ANGIOPLASTY Right 2017   • SPLENECTOMY      partial spleen removal        Current Problems   Hypoglycemia  -Hypoglycemic on admission, resolved  -Endocrinology following      T2DM  -Endocrinology following     SHAHRAM on CKD  -Nephrology following     Community-acquired pneumonia     HFpEF  Aortic stenosis     Atrial fibrillation  -Continue amiodarone and Eliquis       Encounter Information        Trending Narrative     3/10: Pt assessed due to concern for skin breakdown. Pt known to clinical nutrition services from recent admission at end of 02/2023. During that admission, pt assessed as malnourished -- pt was with other providers x 2 attempts today; will need to follow up for repeat NFPE. In prior assessment, pt was open to ONS and denied any chewing/swallowing difficulty.  "Will continue to follow up for further assessment. So far, pt has been eating fairly well with about 75% intake at meals.      Anthropometrics        Current Height, Weight Height: 177.8 cm (70\")  Weight: 78.5 kg (173 lb 1 oz) (03/10/23 0521)       Ideal Body Weight (IBW) 166 lb   Usual Body Weight (UBW) UTD       Trending Weight Hx     This admission: 3/10: Scale weight 78.5 kg             PTA: 3/10: Net 12% weight gain in 1 month IF current weight is accurate -- appearance and recent weight Hx suggest this weight may be skewed by fluid weight     Wt Readings from Last 30 Encounters:   03/10/23 0521 78.5 kg (173 lb 1 oz)   03/09/23 0315 76.9 kg (169 lb 8.5 oz)   03/08/23 0637 74.8 kg (165 lb)   03/07/23 0421 75 kg (165 lb 5.5 oz)   03/02/23 0351 73.1 kg (161 lb 2.5 oz)   03/01/23 0358 73.1 kg (161 lb 2.5 oz)   02/28/23 0348 72 kg (158 lb 11.7 oz)   02/26/23 0357 71.7 kg (158 lb 1.1 oz)   02/25/23 0413 69.5 kg (153 lb 3.5 oz)   02/24/23 0356 68.9 kg (151 lb 14.4 oz)   02/23/23 2001 68.9 kg (151 lb 14.4 oz)   02/23/23 0445 70 kg (154 lb 6.4 oz)   03/03/20 1313 62.1 kg (137 lb)   11/05/19 1140 60.2 kg (132 lb 11.5 oz)   08/29/19 1452 59.4 kg (131 lb)   07/30/19 0945 60.2 kg (132 lb 11.5 oz)   07/09/19 1308 61.7 kg (136 lb)   07/08/19 1307 65.8 kg (145 lb)   06/11/19 1341 64.7 kg (142 lb 9.6 oz)      BMI kg/m2 Body mass index is 24.83 kg/m².       Labs        Pertinent Labs    Results from last 7 days   Lab Units 03/10/23  0827 03/09/23  1045 03/08/23  1930 03/08/23  0654 03/07/23  0942 03/05/23  2247 03/05/23  1241 03/04/23  0618   SODIUM mmol/L 135* 131*  --  131*   < > 129* 131* 132*   POTASSIUM mmol/L 5.0 4.9 5.2 5.7*   < > 5.2 5.0 4.6   CHLORIDE mmol/L 99 95*  --  95*   < > 93* 97* 96*   CO2 mmol/L 19.0* 18.0*  --  19.0*   < > 20.0* 22.0 23.0   BUN mg/dL 98* 97*  --  98*   < > 86* 81* 80*   CREATININE mg/dL 5.00* 4.98*  --  4.81*   < > 4.74* 4.27* 4.06*   CALCIUM mg/dL 8.0* 8.0*  --  8.3*   < > 8.2* 8.0* 8.3* "   BILIRUBIN mg/dL  --   --   --   --   --  0.2 0.2 0.2   ALK PHOS U/L  --   --   --   --   --  111 113 114   ALT (SGPT) U/L  --   --   --   --   --  24 21 18   AST (SGOT) U/L  --   --   --   --   --  35 28 33   GLUCOSE mg/dL 198* 233*  --  83   < > 115* 185* 124*    < > = values in this interval not displayed.     Results from last 7 days   Lab Units 03/10/23  0827 03/07/23  0942 03/05/23  2247 03/05/23  1241 03/04/23  0618   MAGNESIUM mg/dL  --   --  1.7 1.6 1.6   PHOSPHORUS mg/dL 8.0*   < > 6.9* 6.5* 6.5*   HEMOGLOBIN g/dL 9.0*   < > 9.3* 9.3* 9.1*   HEMATOCRIT % 27.9*   < > 27.7* 28.7* 28.2*    < > = values in this interval not displayed.     COVID19   Date Value Ref Range Status   03/08/2023 Not Detected Not Detected - Ref. Range Final     Lab Results   Component Value Date    HGBA1C 7.4 (H) 02/23/2023        Medications    Scheduled Medications amiodarone, 200 mg, Oral, TID  atorvastatin, 40 mg, Oral, Nightly  budesonide, 0.5 mg, Nebulization, BID - RT  cefTRIAXone, 1 g, Intravenous, Q24H  clopidogrel, 75 mg, Oral, Daily  [START ON 3/11/2023] cosyntropin, 0.25 mg, Intravenous, Once  folic acid, 1 mg, Oral, Daily  ipratropium-albuterol, 3 mL, Nebulization, 4x Daily - RT  levothyroxine, 25 mcg, Oral, Q AM  linagliptin, 5 mg, Oral, Daily  midodrine, 10 mg, Oral, TID AC  multivitamin with minerals, 1 tablet, Oral, Daily  pantoprazole, 40 mg, Oral, Daily  sodium chloride, 10 mL, Intravenous, Q12H  thiamine, 100 mg, Oral, Daily        Infusions      PRN Medications •  acetaminophen  •  dextrose  •  dextrose  •  dextrose  •  glucagon (human recombinant)  •  mineral oil-hydrophilic petrolatum  •  nitroglycerin  •  ondansetron  •  sodium chloride  •  sodium chloride     Physical Findings        Trending Physical   Appearance, NFPE 3/10: Pt unavailable for NFPE this date -- during previous admission did have Dx malnutrition; will follow up to monitor trends     --  Edema  2+ wrists, hands, ankles, feet     Bowel Function  "Stool Output  Stool Unmeasured Occurrence: 1 (03/10/23 1400)  Bowel Incontinence: No (03/10/23 1400)  Stool Amount: moderate (03/10/23 1400)      Tubes No feeding tube in place      Chewing/Swallowing No problems documented     Skin Stage 2 coccyx   unstaged injury to R heel        Estimated/Assessed Needs       Energy Requirements    Height for Calculation  Height: 177.8 cm (70\")   Weight for Calculation    Method for Estimation     EST Needs (kcal/day)        Protein Requirements    Weight for Calculation 78.5 kg   EST Protein Needs (g/kg) 1.5 g/kg   EST Daily Needs (g/day) 118 g/day       Fluid Requirements     Estimated Needs (mL/day)        Fluid Deficit    Current Na Level (mEq/L)    Desired Na Level (mEq/L)    Estimated Fluid Deficit (L)       Current Nutrition Orders & Evaluation of Intake       Oral Nutrition     Food Allergies NKFA   Current PO Diet NPO Diet NPO Type: Strict NPO   Supplement None currently ordered   PO Evaluation     Trending % PO Intake 3/10: Eating 50-75% at recent meals    --  Nutritional Risk Screening        NRS-2002 Score          Nutrition Diagnosis         Nutrition Dx Problem 1 Increased nutrient needs (protein) R/t skin healing; as evidenced by current pressure injuries.      Nutrition Dx Problem 2        Intervention Goal         Intervention Goal(s) Intakes continue 75% or better, consistently   Weight stabilizing      Nutrition Intervention        RD Action Will add ONS to help meet needs      Nutrition Prescription          Diet Prescription Suggest goal of Consistent Carbohydrate diet, Low Phosphorus, Low Potassium when diet resumes   Supplement Prescription Novasource Renal q daily provides (475 kcals, 22 g protein if consumed)   Magic Cup q daily (290 kcals, 9 g protein if consumed)   When diet resumes   --  Monitor/Evaluation        Monitor PO intake, Pertinent labs, Weight, Skin status, GI status, POC/GOC     Electronically signed by:  Cass Addison RD  03/10/23 16:02 " EST

## 2023-03-10 NOTE — PLAN OF CARE
Problem: Adult Inpatient Plan of Care  Goal: Plan of Care Review  Outcome: Ongoing, Progressing   Goal Outcome Evaluation:               Pt rested well overnight, VSS. FC in place & patent. ACTH stim test scheduled for AM. Pt to discharge back to [facility] when stable. Pt educated on current plan of care, verbalized understanding.

## 2023-03-10 NOTE — NURSING NOTE
Pt moved by IR staff from bed onto IR procedure table into supine position. Pt remains on 3L oxygen via N/C and is being placed on cardiac monitor. Pt's right IJ vein confirmed patent by LOLA Kovacs RTR, using ultrasound guidance. Pt's right neck and chest then shaved of any hair and prepped in sterile fashion using chlorhexidine scrub.

## 2023-03-10 NOTE — PLAN OF CARE
Goal Outcome Evaluation:      Pt. Rested in bed with no complaints. Pt NPO after breakfast for prep for tunnel cath insertion. Pt then taken straight to dialysis after tunnel cath placement

## 2023-03-10 NOTE — PROGRESS NOTES
"[facility] PROGRESS NOTE     LOS: 2 days    Patient Care Team:  Arnulfo Monroy MD as PCP - General (Internal Medicine)  Kym Aparicio MD as Consulting Physician (Endocrinology)      Subjective     Patient resting comfortably.  Denies any chest pain, shortness of breath, nausea or vomiting.  Diaz cath in place.  Good urine output    Objective     Vital Sign Min/Max for last 24 hours  Temp:  [97.5 °F (36.4 °C)-98 °F (36.7 °C)] 97.7 °F (36.5 °C)  Heart Rate:  [63-72] 68  Resp:  [10-18] 13  BP: (107-131)/(63-78) 110/70                       Flowsheet Rows    Flowsheet Row First Filed Value   Admission Height 177.8 cm (70\") Documented at 03/08/2023 0637   Admission Weight 74.8 kg (165 lb) Documented at 03/08/2023 0637          No intake/output data recorded.  I/O last 3 completed shifts:  In: 2179 [P.O.:720; I.V.:1359; IV Piggyback:100]  Out: 950 [Urine:950]    Physical Exam:  Physical Exam    General Appearance: Chronically ill-appearing, NAD  Skin: warm and dry  HEENT: oral mucosa normal, nonicteric sclera  Neck: supple, no JVD  Lungs: Few scattered wheezes bilaterally  Heart: RRR, normal S1 and S2  Abdomen: soft, nontender, nondistended  : no palpable bladder  Extremities: Trace bilateral lower extreme edema.    Neuro: normal speech and mental status        LABS:  Lab Results   Component Value Date    CALCIUM 8.0 (L) 03/10/2023    PHOS 8.0 (H) 03/10/2023     Results from last 7 days   Lab Units 03/10/23  0827 03/09/23  1554 03/09/23  1045 03/08/23  1930 03/08/23  0654 03/07/23  0942 03/05/23  2247 03/05/23  1241 03/04/23  0618   MAGNESIUM mg/dL  --   --   --   --   --   --  1.7 1.6 1.6   SODIUM mmol/L 135*  --  131*  --  131*   < > 129* 131* 132*   POTASSIUM mmol/L 5.0  --  4.9 5.2 5.7*   < > 5.2 5.0 4.6   CHLORIDE mmol/L 99  --  95*  --  95*   < > 93* 97* 96*   CO2 mmol/L 19.0*  --  18.0*  --  19.0*   < > 20.0* 22.0 23.0   BUN mg/dL 98*  --  97*  --  98*   < > 86* 81* 80*   CREATININE mg/dL 5.00*  --  " 4.98*  --  4.81*   < > 4.74* 4.27* 4.06*   GLUCOSE mg/dL 198*  --  233*  --  83   < > 115* 185* 124*   CALCIUM mg/dL 8.0*  --  8.0*  --  8.3*   < > 8.2* 8.0* 8.3*   WBC 10*3/mm3 5.00 4.20  --   --  4.20   < > 4.50 4.30 4.80   HEMOGLOBIN g/dL 9.0* 8.3*  --   --  9.3*   < > 9.3* 9.3* 9.1*   PLATELETS 10*3/mm3 316 306  --   --  360   < > 344 344 344   ALT (SGPT) U/L  --   --   --   --   --   --  24 21 18   AST (SGOT) U/L  --   --   --   --   --   --  35 28 33    < > = values in this interval not displayed.     No results found for: CKTOTAL, CKMB, CKMBINDEX, TROPONINI, TROPONINT  Estimated Creatinine Clearance: 14.2 mL/min (A) (by C-G formula based on SCr of 5 mg/dL (H)).      Brief Urine Lab Results  (Last result in the past 365 days)      Color   Clarity   Blood   Leuk Est   Nitrite   Protein   CREAT   Urine HCG        03/04/23 2046             68.0         03/04/23 2046 Orange  Comment: Any Substance that causes an abnormal urine color can alter the accuracy of the chemical reactions.   Turbid   Large (3+)   Large (3+)   Negative   100 mg/dL (2+)               WEIGHTS:     Wt Readings from Last 1 Encounters:   03/10/23 0521 78.5 kg (173 lb 1 oz)   03/09/23 0315 76.9 kg (169 lb 8.5 oz)   03/08/23 0637 74.8 kg (165 lb)       amiodarone, 200 mg, Oral, TID  atorvastatin, 40 mg, Oral, Nightly  budesonide, 0.5 mg, Nebulization, BID - RT  cefTRIAXone, 1 g, Intravenous, Q24H  clopidogrel, 75 mg, Oral, Daily  cosyntropin, 0.25 mg, Intravenous, Once  folic acid, 1 mg, Oral, Daily  ipratropium-albuterol, 3 mL, Nebulization, 4x Daily - RT  levothyroxine, 25 mcg, Oral, Q AM  linagliptin, 5 mg, Oral, Daily  midodrine, 10 mg, Oral, TID AC  multivitamin with minerals, 1 tablet, Oral, Daily  pantoprazole, 40 mg, Oral, Daily  sodium chloride, 10 mL, Intravenous, Q12H  thiamine, 100 mg, Oral, Daily      sodium chloride, 75 mL/hr, Last Rate: 75 mL/hr (03/09/23 2051)        Assessment & Plan       1.  Acute kidney injury  Patient had  underlying chronic kidney disease, most likely due to diabetic glomerulosclerosis but his renal function had worsened recently.  He was admitted with fluid overload and his renal function has worsened with diuresis.  Creatinine still increasing despite IV hydration.  Electrolytes acceptable.  Patient has mild metabolic acidosis  2.  Hypotension.  ACTH stimulation test ordered by endocrinology  3.  Urinary retention.  Diaz cath in place  4.  Hypoglycemia.  5.  CHF.  Patient has underlying diastolic dysfunction and valvular heart disease/aortic stenosis  6.  Hyperkalemia.  Improved with medical management  7.  Hyperphosphatemia    Plan:  Starting patient on hemodialysis as patient may get fluid overload with IV hydration and his renal function has not improved.  I will order tunneled catheter placement.  Patient renal function will be monitored for renal recovery  Continue oral midodrine  Discontinue IV fluids  Start phosphorus binders      Emanuel Beauchamp MD  03/10/23  09:19 EST

## 2023-03-10 NOTE — CASE MANAGEMENT/SOCIAL WORK
Continued Stay Note  [facility]     Patient Name: Holger Ibrahim  MRN: [id number]  Today's Date: 3/10/2023    Admit Date: 3/8/2023    Plan: Return to [facility].  Np precert or PASRR required.   If OP HD needed, approved for dialysis @ [facility].   Discharge Plan     Row Name 03/10/23 1023       Plan    Plan Return to [facility].  Np precert or PASRR required.   If OP HD needed, approved for dialysis @ [facility].    Patient/Family in Agreement with Plan yes    Plan Comments Barriers to discharge: Dialysis today.  Plan IR for dialysis line placement.  Renal following.  Watch BUN/ creat.  IV antibiotics. Endocrine following              Expected Discharge Date and Time     Expected Discharge Date Expected Discharge Time    Mar 13, 2023         Phone communication or documentation only - no physical contact with patient or family.  Quiana Kumar RN     Office Phone [phone]  Office Cell [phone]

## 2023-03-11 NOTE — PLAN OF CARE
Goal Outcome Evaluation:      Called the Lab,  messaged to 2 phlebotomists, Lisa Mendoza and Kae Owusu, and reminded that pt need to be done ACTH stimulus test at 8 am.  Reji answered she would let me know when she made it but didn't came back yet.

## 2023-03-11 NOTE — PLAN OF CARE
Goal Outcome Evaluation:         Not done ACTH test yet. So this nurse Messaged  to Kae Mendoza,Phlebotomist, to remind her about this test.

## 2023-03-11 NOTE — PLAN OF CARE
Goal Outcome Evaluation:         Given Cosyntropin 0.25 mg injection after baseline specimen collection at 12:47 by Phlebotomist, Lisa Mendoza. she agreed to come back in 30 mins at 1310 to  do 2nd draw.

## 2023-03-11 NOTE — PROGRESS NOTES
[facility] Medicine Services Daily Progress Note    Patient Name: Holger Ibrahim  : 1945  MRN: [id number]  Primary Care Physician:  Arnulfo Monroy MD  Date of admission: 3/8/2023      Subjective      Chief Complaint: Hypoglycemia      Patient Reports no complaints.    ROS   Negative except as mentioned above    Objective      Vitals:   Temp:  [97.5 °F (36.4 °C)-98.2 °F (36.8 °C)] 98 °F (36.7 °C)  Heart Rate:  [60-69] 67  Resp:  [8-20] 11  BP: ()/(47-80) 96/47  Flow (L/min):  [2] 2    Physical Exam  Constitutional:       General: He is not in acute distress.     Appearance: He is not ill-appearing.   HENT:      Head: Normocephalic.      Mouth/Throat:      Mouth: Mucous membranes are moist.   Eyes:      Extraocular Movements: Extraocular movements intact.      Pupils: Pupils are equal, round, and reactive to light.   Cardiovascular:      Rate and Rhythm: Normal rate and regular rhythm.   Pulmonary:      Effort: Pulmonary effort is normal. No respiratory distress.      Breath sounds: No wheezing.   Abdominal:      General: Bowel sounds are normal. There is no distension.      Palpations: Abdomen is soft.      Tenderness: There is no abdominal tenderness.   Neurological:      General: No focal deficit present.      Mental Status: He is alert and oriented to person, place, and time.             Result Review    Result Review:  I have personally reviewed the results from the time of this admission to 3/11/2023 11:40 EST and agree with these findings:  [x]  Laboratory  []  Microbiology  []  Radiology  []  EKG/Telemetry   []  Cardiology/Vascular   []  Pathology  []  Old records  []  Other:  Most notable findings include:    Wounds (last 24 hours)     LDA Wound     Row Name 03/10/23 1939             Wound 23 1437 medial coccyx Pressure Injury    Wound - Properties Group Placement Date: 23  - Placement Time:   - Present on Hospital Admission: Y  - Orientation:  medial  -SH Location: coccyx  -SH Primary Wound Type: Pressure inj  -SH Additional Comments: stage 2  -SH    Dressing Appearance dry;intact  -AF      Closure Adhesive bandage  -AF      Retired Wound - Properties Group Placement Date: 02/23/23  -SH Placement Time: 1437  -SH Present on Hospital Admission: Y  -SH Orientation: medial  -SH Location: coccyx  -SH Primary Wound Type: Pressure inj  -SH Additional Comments: stage 2  -SH    Retired Wound - Properties Group Date first assessed: 02/23/23  -SH Time first assessed: 1437  -SH Present on Hospital Admission: Y  -SH Location: coccyx  -SH Primary Wound Type: Pressure inj  -SH Additional Comments: stage 2  -SH       Wound 02/23/23 1715 Left posterior heel    Wound - Properties Group Placement Date: 02/23/23  -BS Placement Time: 1715 -BS Side: Left  -BS Orientation: posterior  -BS Location: heel  -BS    Closure Adhesive bandage  -AF      Base red;pink  -AF      Retired Wound - Properties Group Placement Date: 02/23/23  -BS Placement Time: 1715  -BS Side: Left  -BS Orientation: posterior  -BS Location: heel  -BS    Retired Wound - Properties Group Date first assessed: 02/23/23  -BS Time first assessed: 1715  -BS Side: Left  -BS Location: heel  -BS       Wound 02/23/23 1712 Right posterior heel Pressure Injury    Wound - Properties Group Placement Date: 02/23/23  -BS Placement Time: 1712 -BS Present on Hospital Admission: Y  -BS Side: Right  -BS Orientation: posterior  -BS Location: heel  -BS Primary Wound Type: Pressure inj  -BS    Pressure Injury Stage 2  -AF      Closure Open to air  -AF      Retired Wound - Properties Group Placement Date: 02/23/23  -BS Placement Time: 1712  -BS Present on Hospital Admission: Y  -BS Side: Right  -BS Orientation: posterior  -BS Location: heel  -BS Primary Wound Type: Pressure inj  -BS    Retired Wound - Properties Group Date first assessed: 02/23/23  -BS Time first assessed: 1712  -BS Present on Hospital Admission: Y  -BS Side: Right   -BS Location: heel  -BS Primary Wound Type: Pressure inj  -BS          User Key  (r) = Recorded By, (t) = Taken By, (c) = Cosigned By    Initials Name Provider Type    Yudi Mireles RN Registered Nurse    Stella Springer RN Registered Nurse    Ledy Saul LPN Licensed Nurse                  Assessment & Plan      Brief Patient Summary:  Holger Ibrahim is a 77 y.o. male who       amiodarone, 200 mg, Oral, TID  atorvastatin, 40 mg, Oral, Nightly  budesonide, 0.5 mg, Nebulization, BID - RT  cefTRIAXone, 1 g, Intravenous, Q24H  clopidogrel, 75 mg, Oral, Daily  cosyntropin, 0.25 mg, Intravenous, Once  folic acid, 1 mg, Oral, Daily  ipratropium-albuterol, 3 mL, Nebulization, 4x Daily - RT  levothyroxine, 25 mcg, Oral, Q AM  linagliptin, 5 mg, Oral, Daily  midodrine, 10 mg, Oral, TID AC  multivitamin with minerals, 1 tablet, Oral, Daily  pantoprazole, 40 mg, Oral, Daily  sodium chloride, 10 mL, Intravenous, Q12H  thiamine, 100 mg, Oral, Daily             Active Hospital Problems:  Active Hospital Problems    Diagnosis    • **Hypoglycemia      Plan:     Hypoglycemia  -Hypoglycemic on admission, resolved  -Endocrinology following appreciate their recommendations  -f/u ACTH stim test on tunneled catheter placed    T2DM  -Endocrinology following, appreciate their recommendations    ESRD  -Status post tunneled catheter placement 3/10, patient underwent HD  -Patient with history of CKD, chronically progressing  -Nephrology following    Community-acquired pneumonia  -Continue ceftriaxone  -Continue DuoNebs and Pulmicort  -Room air    HFpEF  Aortic stenosis  -Euvolemic  -EF showing 33%     Atrial fibrillation  -Continue amiodarone and Eliquis    DVT prophylaxis:  No DVT prophylaxis order currently exists.    CODE STATUS:    Level Of Support Discussed With: Patient  Code Status (Patient has no pulse and is not breathing): No CPR (Do Not Attempt to Resuscitate)  Medical Interventions (Patient has pulse or is  breathing): Full Support      Disposition:  I expect patient to be discharged in the next 2 to 3 days.        Electronically signed by Marky Funk MD, 03/11/23, 11:40 EST.  [facility] Hospitalist Team

## 2023-03-11 NOTE — PROGRESS NOTES
Daily Progress Note    Patient Care Team:  Arnulfo Monroy MD as PCP - General (Internal Medicine)  Kym Aparicio MD as Consulting Physician (Endocrinology)    Chief Complaint: Follow-up type 2 diabetes     HPI: Patient seen and examined today.  Blood sugar log reviewed, does have some high blood sugars.  Overall doing well.     ROS:   Constitutional:  Denies fatigue, tiredness.    Respiratory: denies cough, shortness of breath.   Cardiovascular:  denies chest pain, edema   GI:  Denies abdominal pain, nausea, vomiting.       Vitals:    03/11/23 0918   BP: 96/47   Pulse: 69   Resp: 12   Temp: 98 °F (36.7 °C)   SpO2: 100%     Body mass index is 24.83 kg/m².    Physical Exam:  GEN: NAD, conversant  PSYCH: Awake and coherent      Results Review:     I reviewed the patient's new clinical results.    Glucose   Date Value Ref Range Status   03/10/2023 198 (H) 65 - 99 mg/dL Final     Sodium   Date Value Ref Range Status   03/10/2023 135 (L) 136 - 145 mmol/L Final     Potassium   Date Value Ref Range Status   03/10/2023 5.0 3.5 - 5.2 mmol/L Final     CO2   Date Value Ref Range Status   03/10/2023 19.0 (L) 22.0 - 29.0 mmol/L Final     Chloride   Date Value Ref Range Status   03/10/2023 99 98 - 107 mmol/L Final     Anion Gap   Date Value Ref Range Status   03/10/2023 17.0 (H) 5.0 - 15.0 mmol/L Final     Creatinine   Date Value Ref Range Status   03/10/2023 5.00 (H) 0.76 - 1.27 mg/dL Final     BUN   Date Value Ref Range Status   03/10/2023 98 (H) 8 - 23 mg/dL Final     BUN/Creatinine Ratio   Date Value Ref Range Status   03/10/2023 19.6 7.0 - 25.0 Final     Calcium   Date Value Ref Range Status   03/10/2023 8.0 (L) 8.6 - 10.5 mg/dL Final     Albumin   Date Value Ref Range Status   03/10/2023 2.8 (L) 3.5 - 5.2 g/dL Final     Phosphorus   Date Value Ref Range Status   03/10/2023 8.0 (H) 2.5 - 4.5 mg/dL Final     Lab Results   Component Value Date    HGBA1C 7.4 (H) 02/23/2023    HGBA1C 7.6 (H) 02/02/2023    HGBA1C 10.4  (H) 12/13/2022     No results found for: GLUF, MICROALBUR  Results from last 7 days   Lab Units 03/11/23  0754 03/11/23  0359 03/11/23  0002 03/10/23  2012 03/10/23  1131 03/10/23  0711   GLUCOSE mg/dL 243* 213* 247* 249* 230* 208*             Medication Review: Reviewed.     amiodarone, 200 mg, Oral, TID  atorvastatin, 40 mg, Oral, Nightly  budesonide, 0.5 mg, Nebulization, BID - RT  cefTRIAXone, 1 g, Intravenous, Q24H  clopidogrel, 75 mg, Oral, Daily  cosyntropin, 0.25 mg, Intravenous, Once  folic acid, 1 mg, Oral, Daily  ipratropium-albuterol, 3 mL, Nebulization, 4x Daily - RT  levothyroxine, 25 mcg, Oral, Q AM  linagliptin, 5 mg, Oral, Daily  midodrine, 10 mg, Oral, TID AC  multivitamin with minerals, 1 tablet, Oral, Daily  pantoprazole, 40 mg, Oral, Daily  sodium chloride, 10 mL, Intravenous, Q12H  thiamine, 100 mg, Oral, Daily              Assessment and plan:  Hypoglycemia: Resolved.     Diabetes mellitus type 2 with hyperglycemia: Uncontrolled, on Tradjenta.  Blood sugars stable, continue current medications and avoid insulin in this gentleman.  He is very sensitive.  Goal is to prevent hypoglycemia.  Keep the blood sugars between 150-250.      Hypothyroidism: On levothyroxine 25 mcg p.o. daily.    Mekhi Vidal MD. FACE

## 2023-03-11 NOTE — PROGRESS NOTES
"[facility] PROGRESS NOTE     LOS: 3 days    Patient Care Team:  Arnulfo Monroy MD as PCP - General (Internal Medicine)  Kym Aparicio MD as Consulting Physician (Endocrinology)      Subjective     Patient resting comfortably.  Denies any chest pain, shortness of breath, nausea or vomiting. Very weak; tolerated tunneled dialysis cath placement yesterday without incident    Objective     Vital Sign Min/Max for last 24 hours  Temp:  [97.6 °F (36.4 °C)-98.2 °F (36.8 °C)] 98 °F (36.7 °C)  Heart Rate:  [60-72] 72  Resp:  [8-20] 10  BP: ()/(47-76) 96/47                       Flowsheet Rows    Flowsheet Row First Filed Value   Admission Height 177.8 cm (70\") Documented at 03/08/2023 0637   Admission Weight 74.8 kg (165 lb) Documented at 03/08/2023 0637          I/O this shift:  In: 360 [P.O.:360]  Out: -   I/O last 3 completed shifts:  In: 1300 [P.O.:840; I.V.:360; IV Piggyback:100]  Out: 804 [Urine:800; Emesis/NG output:4]    Physical Exam:  Physical Exam    General Appearance: Chronically ill-appearing, NAD, pleasant  Skin: warm and dry  HEENT: oral mucosa normal, nonicteric sclera  Neck: supple, flat jvp  Lungs: Few scattered wheezes bilaterally  Heart: RRR, normal S1 and S2  Abdomen: soft, nontender, nondistended  : no palpable bladder  Extremities: no edema.    Neuro: normal speech and mental status        LABS:  Lab Results   Component Value Date    CALCIUM 8.0 (L) 03/10/2023    PHOS 8.0 (H) 03/10/2023     Results from last 7 days   Lab Units 03/10/23  0827 03/09/23  1554 03/09/23  1045 03/08/23  1930 03/08/23  0654 03/07/23  0942 03/05/23  2247 03/05/23  1241   MAGNESIUM mg/dL  --   --   --   --   --   --  1.7 1.6   SODIUM mmol/L 135*  --  131*  --  131*   < > 129* 131*   POTASSIUM mmol/L 5.0  --  4.9 5.2 5.7*   < > 5.2 5.0   CHLORIDE mmol/L 99  --  95*  --  95*   < > 93* 97*   CO2 mmol/L 19.0*  --  18.0*  --  19.0*   < > 20.0* 22.0   BUN mg/dL 98*  --  97*  --  98*   < > 86* 81*   CREATININE mg/dL " 5.00*  --  4.98*  --  4.81*   < > 4.74* 4.27*   GLUCOSE mg/dL 198*  --  233*  --  83   < > 115* 185*   CALCIUM mg/dL 8.0*  --  8.0*  --  8.3*   < > 8.2* 8.0*   WBC 10*3/mm3 5.00 4.20  --   --  4.20   < > 4.50 4.30   HEMOGLOBIN g/dL 9.0* 8.3*  --   --  9.3*   < > 9.3* 9.3*   PLATELETS 10*3/mm3 316 306  --   --  360   < > 344 344   ALT (SGPT) U/L  --   --   --   --   --   --  24 21   AST (SGOT) U/L  --   --   --   --   --   --  35 28    < > = values in this interval not displayed.     No results found for: CKTOTAL, CKMB, CKMBINDEX, TROPONINI, TROPONINT  Estimated Creatinine Clearance: 14.2 mL/min (A) (by C-G formula based on SCr of 5 mg/dL (H)).      Brief Urine Lab Results  (Last result in the past 365 days)      Color   Clarity   Blood   Leuk Est   Nitrite   Protein   CREAT   Urine HCG        03/04/23 2046             68.0         03/04/23 2046 Orange  Comment: Any Substance that causes an abnormal urine color can alter the accuracy of the chemical reactions.   Turbid   Large (3+)   Large (3+)   Negative   100 mg/dL (2+)               WEIGHTS:     Wt Readings from Last 1 Encounters:   03/10/23 0521 78.5 kg (173 lb 1 oz)   03/09/23 0315 76.9 kg (169 lb 8.5 oz)   03/08/23 0637 74.8 kg (165 lb)       amiodarone, 200 mg, Oral, TID  atorvastatin, 40 mg, Oral, Nightly  budesonide, 0.5 mg, Nebulization, BID - RT  cefTRIAXone, 1 g, Intravenous, Q24H  clopidogrel, 75 mg, Oral, Daily  cosyntropin, 0.25 mg, Intravenous, Once  folic acid, 1 mg, Oral, Daily  ipratropium-albuterol, 3 mL, Nebulization, 4x Daily - RT  levothyroxine, 25 mcg, Oral, Q AM  linagliptin, 5 mg, Oral, Daily  midodrine, 10 mg, Oral, TID AC  multivitamin with minerals, 1 tablet, Oral, Daily  pantoprazole, 40 mg, Oral, Daily  sodium chloride, 10 mL, Intravenous, Q12H  thiamine, 100 mg, Oral, Daily           Assessment & Plan       1.  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  He was admitted with fluid overload and his renal function has worsened with diuresis.He dialyzed for the first time yesterday without incident.  2.  Hypotension.  ACTH stimulation test ordered by endocrinology  3.  Urinary retention.  Diaz cath in place  4.  Hypoglycemia.  5.  CHF.  Patient has underlying diastolic dysfunction and valvular heart disease/aortic stenosis  6.  Hyperkalemia.  Improved with medical management  7.  Hyperphosphatemia    Plan:  Dialysis tomorrow  Patient renal function will be monitored for renal recovery  Continue oral midodrine  Discontinue IV fluids  Start phosphorus binders  Will need outpatient dialysis arranged  Renal function should recover but has not yet      Rey Gr MD  03/11/23  12:51 EST

## 2023-03-11 NOTE — PLAN OF CARE
Goal Outcome Evaluation:         Bp 96/47 MD  notified . Answered ok to give him scheduled Midodrine and Amiodarone

## 2023-03-11 NOTE — PLAN OF CARE
Goal Outcome Evaluation:  Plan of Care Reviewed With: patient        Progress: no change   Pt A&O x4, 2L NC. Pt has a rader cath and tunnel cath.

## 2023-03-12 NOTE — NURSING NOTE
Noted patient blood sugar 409mg/dl. Informed Dr. Vidal. Ordered humalog 2units one dose. Will cont to monitor pt.

## 2023-03-12 NOTE — PROGRESS NOTES
[facility] Daily Progress Note    Patient Name: Holger Ibrahim  : 1945  MRN: [id number]  Primary Care Physician:  Arnulfo Monroy MD  Date of admission: 3/8/2023      Subjective      Chief Complaint: Hypoglycemia      Patient Reports no complaints.    ROS   Negative except as mentioned above    Objective      Vitals:   Temp:  [97.4 °F (36.3 °C)-98.2 °F (36.8 °C)] 98 °F (36.7 °C)  Heart Rate:  [67-72] 72  Resp:  [11-16] 12  BP: (105-155)/(45-87) 111/61  Flow (L/min):  [2] 2    Physical Exam  Constitutional:       General: He is not in acute distress.     Appearance: He is not ill-appearing.   HENT:      Head: Normocephalic.      Mouth/Throat:      Mouth: Mucous membranes are moist.   Eyes:      Extraocular Movements: Extraocular movements intact.      Pupils: Pupils are equal, round, and reactive to light.   Cardiovascular:      Rate and Rhythm: Normal rate and regular rhythm.   Pulmonary:      Effort: Pulmonary effort is normal. No respiratory distress.      Breath sounds: No wheezing.   Abdominal:      General: Bowel sounds are normal. There is no distension.      Palpations: Abdomen is soft.      Tenderness: There is no abdominal tenderness.   Neurological:      General: No focal deficit present.      Mental Status: He is alert and oriented to person, place, and time.             Result Review    Result Review:  I have personally reviewed the results from the time of this admission to 3/12/2023 12:05 EDT and agree with these findings:  [x]  Laboratory  []  Microbiology  []  Radiology  []  EKG/Telemetry   []  Cardiology/Vascular   []  Pathology  []  Old records  []  Other:  Most notable findings include:    Wounds (last 24 hours)     LDA Wound     Row Name 23 0801 23 2000 23 1601       Wound 23 1437 medial coccyx Pressure Injury    Wound - Properties Group Placement Date: 23  - Placement Time: 143  -SH Present on Hospital Admission:  Y  -SH Orientation: medial  -SH Location: coccyx  -SH Primary Wound Type: Pressure inj  -SH Additional Comments: stage 2  -SH    Pressure Injury Stage -- 2  -LM --    Dressing Appearance -- dry;intact  -LM --    Closure Other (Comment)  mepilex  -YK -- Other (Comment)  mepilex  -YK    Base pink  -YK -- pink  -YK    Periwound pink  -YK -- pink  -YK    Periwound Temperature warm  -YK -- warm  -YK    Periwound Skin Turgor soft  -YK -- soft  -YK    Drainage Amount none  -YK none  -LM none  -YK    Care, Wound cleansed with  -YK cleansed with;sterile normal saline  -LM --    Dressing Care --  mepilex  -YK --  mepilex  -LM --    Periwound Care dry periwound area maintained  -YK dry periwound area maintained  -LM --    Retired Wound - Properties Group Placement Date: 02/23/23  -SH Placement Time: 1437 -SH Present on Hospital Admission: Y  -SH Orientation: medial  -SH Location: coccyx  -SH Primary Wound Type: Pressure inj  -SH Additional Comments: stage 2  -SH    Retired Wound - Properties Group Date first assessed: 02/23/23  -SH Time first assessed: 1437  -SH Present on Hospital Admission: Y  -SH Location: coccyx  -SH Primary Wound Type: Pressure inj  -SH Additional Comments: stage 2  -SH       Wound 02/23/23 1715 Left posterior heel    Wound - Properties Group Placement Date: 02/23/23  -BS Placement Time: 1715  -BS Side: Left  -BS Orientation: posterior  -BS Location: heel  -BS    Closure --  mepilex  -YK -- --  mepilex  -YK    Base red;pink  -YK red;pink  -LM red;pink  -YK    Periwound pink  -YK -- pink  -YK    Periwound Temperature warm  -YK -- warm  -YK    Periwound Skin Turgor firm  -YK -- --    Edges irregular  -YK -- --    Drainage Characteristics/Odor yellow  -YK -- --    Drainage Amount small  -YK scant  -LM scant  -YK    Care, Wound cleansed with  -YK cleansed with;sterile normal saline  -LM --    Dressing Care dressing changed  -YK --  mepilex  -LM --    Periwound Care dry periwound area maintained  -YK dry  periwound area maintained  -LM --    Retired Wound - Properties Group Placement Date: 02/23/23  -BS Placement Time: 1715 -BS Side: Left  -BS Orientation: posterior  -BS Location: heel  -BS    Retired Wound - Properties Group Date first assessed: 02/23/23  -BS Time first assessed: 1715 -BS Side: Left  -BS Location: heel  -BS       Wound 02/23/23 1712 Right posterior heel Pressure Injury    Wound - Properties Group Placement Date: 02/23/23  -BS Placement Time: 1712 -BS Present on Hospital Admission: Y  -BS Side: Right  -BS Orientation: posterior  -BS Location: heel  -BS Primary Wound Type: Pressure inj  -BS    Dressing Appearance -- dry;intact  -LM --    Closure --  mepilex  -YK -- Open to air  -YK    Base pink  -YK -- pink  -YK    Periwound dry  -YK -- dry  -YK    Periwound Temperature warm  -YK -- warm  -YK    Periwound Skin Turgor firm  -YK -- firm  -YK    Drainage Amount none  -YK -- none  -YK    Care, Wound cleansed with  -YK cleansed with;sterile normal saline  -LM --    Dressing Care dressing applied  -YK --  mepilex  -LM --    Periwound Care dry periwound area maintained  -YK dry periwound area maintained  -LM --    Retired Wound - Properties Group Placement Date: 02/23/23  -BS Placement Time: 1712 -BS Present on Hospital Admission: Y  -BS Side: Right  -BS Orientation: posterior  -BS Location: heel  -BS Primary Wound Type: Pressure inj  -BS    Retired Wound - Properties Group Date first assessed: 02/23/23  -BS Time first assessed: 1712 -BS Present on Hospital Admission: Y  -BS Side: Right  -BS Location: heel  -BS Primary Wound Type: Pressure inj  -BS          User Key  (r) = Recorded By, (t) = Taken By, (c) = Cosigned By    Initials Name Provider Type    Yudi Mireles RN Registered Nurse    Evelia Recio RN Registered Nurse    Stephen Ardon, RN Registered Nurse    Ledy Saul LPN Licensed Nurse                  Assessment & Plan      Brief Patient Summary:  Holger Ibrahim is a 77  y.o. male who       amiodarone, 200 mg, Oral, TID  atorvastatin, 40 mg, Oral, Nightly  budesonide, 0.5 mg, Nebulization, BID - RT  clopidogrel, 75 mg, Oral, Daily  folic acid, 1 mg, Oral, Daily  ipratropium-albuterol, 3 mL, Nebulization, 4x Daily - RT  levothyroxine, 25 mcg, Oral, Q AM  linagliptin, 5 mg, Oral, Daily  midodrine, 10 mg, Oral, TID AC  multivitamin with minerals, 1 tablet, Oral, Daily  pantoprazole, 40 mg, Oral, Daily  sodium chloride, 10 mL, Intravenous, Q12H  thiamine, 100 mg, Oral, Daily             Active Hospital Problems:  Active Hospital Problems    Diagnosis    • **Hypoglycemia      Plan:     Hypoglycemia  -Hypoglycemic on admission, resolved  -Endocrinology following appreciate their recommendations  -f/u ACTH stim test on tunneled catheter placed    T2DM  -Endocrinology following, appreciate their recommendations    ESRD  -Status post tunneled catheter placement 3/10, patient underwent HD  -Patient with history of CKD, chronically progressing  -Nephrology following    Community-acquired pneumonia  -Continue ceftriaxone  -Continue DuoNebs and Pulmicort  -Room air    HFpEF  Aortic stenosis  -Euvolemic  -EF showing 33%     Atrial fibrillation  -Continue amiodarone and Eliquis    DVT prophylaxis:  Medical DVT prophylaxis orders are present.    CODE STATUS:    Level Of Support Discussed With: Patient  Code Status (Patient has no pulse and is not breathing): No CPR (Do Not Attempt to Resuscitate)  Medical Interventions (Patient has pulse or is breathing): Full Support      Disposition:  I expect patient to be discharged in the next 2 to 3 days.        Electronically signed by Marky Funk MD, 03/12/23, 12:05 EDT.  [facility] Hospitalist Team

## 2023-03-12 NOTE — PROGRESS NOTES
Daily Progress Note    Patient Care Team:  Arnulfo Monroy MD as PCP - General (Internal Medicine)  Kym Aparicio MD as Consulting Physician (Endocrinology)    Chief Complaint: Follow-up type 2 diabetes    HPI: Patient seen and examined today.  Clinically doing well.  Blood sugar running on the high side.  Did require 2 units of Humalog last night.    ROS:   Constitutional:  Denies fatigue, tiredness.    Respiratory: denies cough, shortness of breath.   Cardiovascular:  denies chest pain, edema   GI:  Denies abdominal pain, nausea, vomiting.         Vitals:    03/12/23 1728   BP: 106/63   Pulse: 68   Resp: 20   Temp: 97.6 °F (36.4 °C)   SpO2: 98%     Body mass index is 24.83 kg/m².    Physical Exam:  GEN: NAD, conversant  PSYCH: Awake and coherent      Results Review:     I reviewed the patient's new clinical results.    Glucose   Date Value Ref Range Status   03/12/2023 251 (H) 65 - 99 mg/dL Final     Sodium   Date Value Ref Range Status   03/12/2023 134 (L) 136 - 145 mmol/L Final     Potassium   Date Value Ref Range Status   03/12/2023 5.0 3.5 - 5.2 mmol/L Final     Comment:     Slight hemolysis detected by analyzer. Results may be affected.     CO2   Date Value Ref Range Status   03/12/2023 20.0 (L) 22.0 - 29.0 mmol/L Final     Chloride   Date Value Ref Range Status   03/12/2023 99 98 - 107 mmol/L Final     Anion Gap   Date Value Ref Range Status   03/12/2023 15.0 5.0 - 15.0 mmol/L Final     Creatinine   Date Value Ref Range Status   03/12/2023 4.40 (H) 0.76 - 1.27 mg/dL Final     BUN   Date Value Ref Range Status   03/12/2023 83 (H) 8 - 23 mg/dL Final     BUN/Creatinine Ratio   Date Value Ref Range Status   03/12/2023 18.9 7.0 - 25.0 Final     Calcium   Date Value Ref Range Status   03/12/2023 8.2 (L) 8.6 - 10.5 mg/dL Final     Albumin   Date Value Ref Range Status   03/11/2023 2.5 (L) 3.5 - 5.2 g/dL Final     Phosphorus   Date Value Ref Range Status   03/11/2023 6.7 (H) 2.5 - 4.5 mg/dL Final     Lab  Results   Component Value Date    HGBA1C 7.4 (H) 02/23/2023    HGBA1C 7.6 (H) 02/02/2023    HGBA1C 10.4 (H) 12/13/2022     No results found for: GLUF, MICROALBUR  Results from last 7 days   Lab Units 03/12/23  1253 03/12/23  0544 03/11/23  2158 03/11/23 2007 03/11/23  1648 03/11/23  1232   GLUCOSE mg/dL 155* 249* 321* 409* 315* 298*             Medication Review: Reviewed.     amiodarone, 200 mg, Oral, TID  atorvastatin, 40 mg, Oral, Nightly  budesonide, 0.5 mg, Nebulization, BID - RT  clopidogrel, 75 mg, Oral, Daily  folic acid, 1 mg, Oral, Daily  ipratropium-albuterol, 3 mL, Nebulization, 4x Daily - RT  levothyroxine, 25 mcg, Oral, Q AM  linagliptin, 5 mg, Oral, Daily  midodrine, 10 mg, Oral, TID AC  multivitamin with minerals, 1 tablet, Oral, Daily  pantoprazole, 40 mg, Oral, Daily  sodium chloride, 10 mL, Intravenous, Q12H  thiamine, 100 mg, Oral, Daily              Assessment and plan:  Diabetes mellitus type 2 with hyperglycemia: Uncontrolled, on Tradjenta.  Blood sugars are high, for now I will continue Tradjenta 5 mg p.o. daily.  He may need a very small dose of long-acting insulin.   Goal is to prevent hypoglycemia.  Keep the blood sugars between 150-250.      Hypothyroidism: On levothyroxine 25 mcg p.o. daily.    Mekhi Vidal MD. FACE

## 2023-03-12 NOTE — PLAN OF CARE
Goal Outcome Evaluation:         Family brought pt's living will document . Put it in the chart. Will report to the next shift nurse. Diaz cath care given. Skin around Tunneled cath cleaned with CHG wipes. Pt stated feels good after dialysis.

## 2023-03-12 NOTE — PLAN OF CARE
Goal Outcome Evaluation:     pt returned from dialysis.   No fluid removed per MD order since patient didn't gain any fluid. Checked vital signs and recorded.

## 2023-03-12 NOTE — PLAN OF CARE
Goal Outcome Evaluation:            ACTH test done. Diaz cath care given. Will be done  dialysis tomorrow. Called dialysis department to inform.

## 2023-03-12 NOTE — PROGRESS NOTES
"[facility] PROGRESS NOTE     LOS: 4 days    Patient Care Team:  Arnulfo Monroy MD as PCP - General (Internal Medicine)  Kym Aparicio MD as Consulting Physician (Endocrinology)      Subjective     Patient resting comfortably.  Denies any chest pain, shortness of breath, nausea or vomiting. Very weak    Objective     Vital Sign Min/Max for last 24 hours  Temp:  [97.8 °F (36.6 °C)-98.2 °F (36.8 °C)] 97.8 °F (36.6 °C)  Heart Rate:  [67-82] 68  Resp:  [12-16] 16  BP: (111-155)/(56-90) 112/62                       Flowsheet Rows    Flowsheet Row First Filed Value   Admission Height 177.8 cm (70\") Documented at 03/08/2023 0637   Admission Weight 74.8 kg (165 lb) Documented at 03/08/2023 0637          I/O this shift:  In: 240 [P.O.:240]  Out: 0   I/O last 3 completed shifts:  In: 1080 [P.O.:1080]  Out: 700 [Urine:700]    Physical Exam:  Physical Exam    General Appearance: Chronically ill-appearing, brighter affect and stronger voice today, pleasant  Skin: warm and dry  HEENT: oral mucosa normal, nonicteric sclera  Neck: supple, flat jvp  Lungs: Few scattered wheezes bilaterally  Heart: RRR, normal S1 and S2  Abdomen: soft, nontender, nondistended  : no palpable bladder  Extremities: no edema.    Neuro: normal speech and mental status        LABS:  Lab Results   Component Value Date    CALCIUM 8.2 (L) 03/12/2023    PHOS 6.7 (H) 03/11/2023     Results from last 7 days   Lab Units 03/12/23  0750 03/11/23  1249 03/10/23  0827 03/09/23  1554 03/08/23  1930 03/08/23  0654 03/07/23  0942 03/05/23  2247   MAGNESIUM mg/dL  --   --   --   --   --   --   --  1.7   SODIUM mmol/L 134* 132* 135*  --    < > 131*   < > 129*   POTASSIUM mmol/L 5.0 4.7 5.0  --    < > 5.7*   < > 5.2   CHLORIDE mmol/L 99 97* 99  --    < > 95*   < > 93*   CO2 mmol/L 20.0* 19.0* 19.0*  --    < > 19.0*   < > 20.0*   BUN mg/dL 83* 78* 98*  --    < > 98*   < > 86*   CREATININE mg/dL 4.40* 4.55* 5.00*  --    < > 4.81*   < > 4.74*   GLUCOSE mg/dL 251* " 310* 198*  --    < > 83   < > 115*   CALCIUM mg/dL 8.2* 8.2* 8.0*  --    < > 8.3*   < > 8.2*   WBC 10*3/mm3  --   --  5.00 4.20  --  4.20   < > 4.50   HEMOGLOBIN g/dL  --   --  9.0* 8.3*  --  9.3*   < > 9.3*   PLATELETS 10*3/mm3  --   --  316 306  --  360   < > 344   ALT (SGPT) U/L  --   --   --   --   --   --   --  24   AST (SGOT) U/L  --   --   --   --   --   --   --  35    < > = values in this interval not displayed.     No results found for: CKTOTAL, CKMB, CKMBINDEX, TROPONINI, TROPONINT  Estimated Creatinine Clearance: 16.1 mL/min (A) (by C-G formula based on SCr of 4.4 mg/dL (H)).      Brief Urine Lab Results  (Last result in the past 365 days)      Color   Clarity   Blood   Leuk Est   Nitrite   Protein   CREAT   Urine HCG        03/04/23 2046             68.0         03/04/23 2046 Orange  Comment: Any Substance that causes an abnormal urine color can alter the accuracy of the chemical reactions.   Turbid   Large (3+)   Large (3+)   Negative   100 mg/dL (2+)               WEIGHTS:     Wt Readings from Last 1 Encounters:   03/10/23 0521 78.5 kg (173 lb 1 oz)   03/09/23 0315 76.9 kg (169 lb 8.5 oz)   03/08/23 0637 74.8 kg (165 lb)       amiodarone, 200 mg, Oral, TID  atorvastatin, 40 mg, Oral, Nightly  budesonide, 0.5 mg, Nebulization, BID - RT  clopidogrel, 75 mg, Oral, Daily  folic acid, 1 mg, Oral, Daily  ipratropium-albuterol, 3 mL, Nebulization, 4x Daily - RT  levothyroxine, 25 mcg, Oral, Q AM  linagliptin, 5 mg, Oral, Daily  midodrine, 10 mg, Oral, TID AC  multivitamin with minerals, 1 tablet, Oral, Daily  pantoprazole, 40 mg, Oral, Daily  sodium chloride, 10 mL, Intravenous, Q12H  thiamine, 100 mg, Oral, Daily           Assessment & Plan       1.  Acute kidney injury  Due to atn, patient had underlying chronic kidney disease prior to that, most likely due to diabetic nephropathy, due for dialysis today  2.  Hypotension.  ACTH stimulation test ordered by endocrinology  3.  Urinary retention.  Diaz cath in  place  4.  Hypoglycemia.  5.  CHF.  Patient has underlying diastolic dysfunction and valvular heart disease/aortic stenosis  6.  Hyperkalemia.  Improved with medical management  7.  Hyperphosphatemia    Plan:  Dialysis today  Continue oral midodrine  Discontinue IV fluids  Start phosphorus binders  Will need outpatient dialysis arranged  Renal function should recover but has not yet      Rey Gr MD  03/12/23  15:05 EDT

## 2023-03-13 NOTE — DISCHARGE SUMMARY
[facility]  DISCHARGE SUMMARY    Patient Name: Holger Ibrahim  : 1945  MRN: [id number]    Date of Admission: 3/8/2023  Discharge Diagnosis: ESRD/community-acquired bacterial pneumonia  Date of Discharge: 3/13/2023  Primary Care Physician: Arnulfo Monroy MD      Presenting Problem:   Hypoglycemia [E16.2]  Pneumonia due to infectious organism, unspecified laterality, unspecified part of lung [J18.9]    Active and Resolved Hospital Problems:  Active Hospital Problems    Diagnosis POA   • **Hypoglycemia [E16.2] Yes      Resolved Hospital Problems   No resolved problems to display.         Hospital Course     Hospital Course:  Holger Ibrahim is a 77 y.o. male     Hypoglycemia  -Hypoglycemic on admission, resolved  -Endocrinology following appreciate their recommendations  -ACTH stim test within normal limits     T2DM  -Endocrinology following, appreciate their recommendations  -Started on linagliptin 5 mg daily     ESRD  -Status post tunneled catheter placement 3/10, patient underwent HD  -Patient with history of CKD, chronically progressing  -Nephrology following     Community-acquired pneumonia  -Completed ceftriaxone course  -Continue DuoNebs and Pulmicort  -Room air     HFpEF  Aortic stenosis  -Euvolemic  -EF showing 33%      Atrial fibrillation  -Continue amiodarone and Eliquis    Stable condition for DC back to SNF    DISCHARGE Follow Up Recommendations for labs and diagnostics:   -HD as per nephro      Reasons For Change In Medications and Indications for New Medications:  -Started on Tradjenta    Day of Discharge     Vital Signs:  Temp:  [97.4 °F (36.3 °C)-98.2 °F (36.8 °C)] 97.8 °F (36.6 °C)  Heart Rate:  [66-82] 66  Resp:  [10-20] 12  BP: ()/(53-90) 98/58  Flow (L/min):  [2] 2    Physical Exam:  Physical Exam     Constitutional:       General: He is not in acute distress.     Appearance: He is not ill-appearing.   HENT:      Head:  Normocephalic.      Mouth/Throat:      Mouth: Mucous membranes are moist.   Eyes:      Extraocular Movements: Extraocular movements intact.      Pupils: Pupils are equal, round, and reactive to light.   Cardiovascular:      Rate and Rhythm: Normal rate and regular rhythm.   Pulmonary:      Effort: Pulmonary effort is normal. No respiratory distress.      Breath sounds: No wheezing.   Abdominal:      General: Bowel sounds are normal. There is no distension.      Palpations: Abdomen is soft.      Tenderness: There is no abdominal tenderness.   Neurological:      General: No focal deficit present.      Mental Status: He is alert and oriented to person, place, and time.    Pertinent  and/or Most Recent Results     LAB RESULTS:      Lab 03/10/23  1115 03/10/23  0827 03/09/23  1554 03/08/23  1930 03/08/23  0811 03/08/23  0654 03/07/23  0942   WBC  --  5.00 4.20  --   --  4.20 5.00   HEMOGLOBIN  --  9.0* 8.3*  --   --  9.3* 8.6*   HEMATOCRIT  --  27.9* 25.6*  --   --  29.2* 25.7*   PLATELETS  --  316 306  --   --  360 342   NEUTROS ABS  --  4.40 3.70  --   --  3.70 4.40   LYMPHS ABS  --  0.30* 0.20*  --   --  0.30* 0.30*   MONOS ABS  --  0.30 0.20  --   --  0.20 0.20   EOS ABS  --  0.00 0.00  --   --  0.00 0.00   MCV  --  89.5 90.1  --   --  90.7 88.8   PROCALCITONIN  --   --   --   --   --  0.45*  --    LACTATE  --   --   --  1.3 3.3*  --   --    PROTIME 12.8*  --   --   --   --   --   --    APTT 34.8*  --   --   --   --   --   --          Lab 03/12/23  0750 03/11/23  1249 03/10/23  0827 03/09/23  1045 03/08/23  1930 03/08/23  0654 03/07/23  0942   SODIUM 134* 132* 135* 131*  --  131* 130*   POTASSIUM 5.0 4.7 5.0 4.9 5.2 5.7* 4.5   CHLORIDE 99 97* 99 95*  --  95* 95*   CO2 20.0* 19.0* 19.0* 18.0*  --  19.0* 21.0*   ANION GAP 15.0 16.0* 17.0* 18.0*  --  17.0* 14.0   BUN 83* 78* 98* 97*  --  98* 94*   CREATININE 4.40* 4.55* 5.00* 4.98*  --  4.81* 4.55*   EGFR 13.3* 12.7* 11.4* 11.4*  --  11.9* 12.7*   GLUCOSE 251* 310* 198*  233*  --  83 180*   CALCIUM 8.2* 8.2* 8.0* 8.0*  --  8.3* 8.0*   PHOSPHORUS  --  6.7* 8.0* 7.4*  --   --  7.2*   TSH  --   --   --  5.350*  --   --   --          Lab 03/11/23  1249 03/10/23  0827 03/09/23  1045 03/07/23  0942   ALBUMIN 2.5* 2.8* 2.6* 2.9*         Lab 03/10/23  1115 03/08/23  0654   PROBNP  --  >70,000.0*   PROTIME 12.8*  --    INR 1.26*  --                  Brief Urine Lab Results  (Last result in the past 365 days)      Color   Clarity   Blood   Leuk Est   Nitrite   Protein   CREAT   Urine HCG        03/04/23 2046             68.0         03/04/23 2046 Orange  Comment: Any Substance that causes an abnormal urine color can alter the accuracy of the chemical reactions.   Turbid   Large (3+)   Large (3+)   Negative   100 mg/dL (2+)               Microbiology Results (last 10 days)     Procedure Component Value - Date/Time    CANDIDA AURIS SCREEN - Swab, Axilla Right, Axilla Left and Groin [[id number]]  (Normal) Collected: 03/09/23 1319    Lab Status: Preliminary result Specimen: Swab from Axilla Right, Axilla Left and Groin Updated: 03/12/23 1430     Candida Auris Screen Culture No Candida auris isolated at 3 days    Blood Culture - Blood, Hand, Left [[id number]]  (Normal) Collected: 03/08/23 0921    Lab Status: Preliminary result Specimen: Blood from Hand, Left Updated: 03/12/23 1030     Blood Culture No growth at 4 days    Narrative:      Less than seven (7) mL's of blood was collected.  Insufficient quantity may yield false negative results.    Blood Culture - Blood, Arm, Left [[id number]]  (Normal) Collected: 03/08/23 0828    Lab Status: Preliminary result Specimen: Blood from Arm, Left Updated: 03/12/23 0946     Blood Culture No growth at 4 days    Narrative:      Less than seven (7) mL's of blood was collected.  Insufficient quantity may yield false negative results.    Respiratory Panel PCR w/COVID-19(SARS-CoV-2) [facility] In-House, NP Swab in UTM/VTM, 3-4 HR TAT - Swab,  Nasopharynx [[id number]]  (Normal) Collected: 03/08/23 0722    Lab Status: Final result Specimen: Swab from Nasopharynx Updated: 03/08/23 0816     ADENOVIRUS, PCR Not Detected     Coronavirus 229E Not Detected     Coronavirus HKU1 Not Detected     Coronavirus NL63 Not Detected     Coronavirus OC43 Not Detected     COVID19 Not Detected     Human Metapneumovirus Not Detected     Human Rhinovirus/Enterovirus Not Detected     Influenza A PCR Not Detected     Influenza B PCR Not Detected     Parainfluenza Virus 1 Not Detected     Parainfluenza Virus 2 Not Detected     Parainfluenza Virus 3 Not Detected     Parainfluenza Virus 4 Not Detected     RSV, PCR Not Detected     Bordetella pertussis pcr Not Detected     Bordetella parapertussis PCR Not Detected     Chlamydophila pneumoniae PCR Not Detected     Mycoplasma pneumo by PCR Not Detected    Narrative:      In the setting of a positive respiratory panel with a viral infection PLUS a negative procalcitonin without other underlying concern for bacterial infection, consider observing off antibiotics or discontinuation of antibiotics and continue supportive care. If the respiratory panel is positive for atypical bacterial infection (Bordetella pertussis, Chlamydophila pneumoniae, or Mycoplasma pneumoniae), consider antibiotic de-escalation to target atypical bacterial infection.          XR Chest 1 View    Result Date: 3/9/2023  Impression: Impression: 1. Right greater than left basilar airspace disease with diffuse background interstitial opacities, may represent combination of bibasilar pneumonia and background interstitial edema. The findings are not thought to be significantly change compared to one day prior. Electronically Signed: Fawn Fitzgerald  3/9/2023 8:16 AM EST  Workstation ID: [id number]    XR Chest 1 View    Result Date: 3/8/2023  Impression: Impression: 1.No improvement in appearance of the chest 2.Bibasilar densities which could relate to pleural effusions and  possibly atelectasis. 3.Prominence of perihilar prominent markings which may be reflective of edema. Electronically Signed: Angelito Davison  3/8/2023 7:13 AM EST  Workstation ID: [id number]    XR Chest 1 View    Result Date: 2/25/2023  Impression: Impression: Extensive multifocal ill-defined infiltrates are again seen bilaterally with diffuse interstitial changes. Small bilateral pleural effusions are noted. The changes are most pronounced in the lower lungs. There is mild worsening on the right. Electronically Signed: Bill Sr  2/25/2023 12:45 PM EST  Workstation ID: [id number]    IR Insert Tunneled CV Catheter Without Port 5 Plus    Result Date: 3/10/2023  Impression: Impression: Placement of tunneled dialysis catheter via right IJ approach using ultrasound and fluoroscopic guidance. Electronically Signed: Troy Sanchez  3/10/2023 3:35 PM EST  Workstation ID: [id number]    XR Chest PA & Lateral    Result Date: 3/4/2023  Impression: Impression: 1.Mild pulmonary edema pattern. 2.Moderate right and small left pleural effusions. 3.Bibasilar opacities, which could reflect atelectasis or pneumonia. Electronically Signed: Raad Chavez  3/4/2023 5:06 PM EST  Workstation ID: [id number]      Results for orders placed during the hospital encounter of 11/15/22    Duplex carotid ultrasound CAR    Interpretation Summary  •  Proximal right internal carotid artery mild stenosis.  •  Proximal left internal carotid artery moderate stenosis.      Results for orders placed during the hospital encounter of 11/15/22    Duplex carotid ultrasound CAR    Interpretation Summary  •  Proximal right internal carotid artery mild stenosis.  •  Proximal left internal carotid artery moderate stenosis.      Results for orders placed during the hospital encounter of 07/30/19    Adult Transesophageal Echo (YG) W/ Cont if Necessary Per Protocol    Interpretation Summary  · Left ventricular systolic function is normal.  · Estimated EF appears to be in the  range of 56 - 60%.  · There is moderate calcification of the aortic valve mainly affecting the left and right coronary cusp(s).  · A bicuspid valve is suggested with fused left-sided cusps.  · Moderate aortic valve stenosis is present.  · Peak/mean aortic valve gradients are 41/22 mmHg respectively.  · Planimeter aortic valve area is 1.5 cm².  · Mild aortic valve regurgitation is present.  · Mild tricuspid valve regurgitation is present.      Labs Pending at Discharge:  Pending Labs     Order Current Status    Basic Metabolic Panel Collected (03/13/23 1904)    ACTH In process    Blood Culture - Blood, Arm, Left Preliminary result    Blood Culture - Blood, Hand, Left Preliminary result    CANDIDA AURIS SCREEN - Swab, Axilla Right, Axilla Left and Groin Preliminary result          Procedures Performed           Consults:   Consults     Date and Time Order Name Status Description    3/8/2023 11:10 AM Inpatient Nephrology Consult      3/8/2023 10:27 AM Inpatient Endocrinology Consult      3/5/2023  8:43 AM Inpatient Pulmonology Consult Completed     2/27/2023  3:27 PM Inpatient Urology Consult Completed     2/23/2023  8:02 AM Inpatient Nephrology Consult Completed     2/23/2023  8:00 AM Inpatient Nephrology Consult Completed             Discharge Details        Discharge Medications      New Medications      Instructions Start Date   linagliptin 5 MG tablet tablet  Commonly known as: TRADJENTA   5 mg, Oral, Daily         Continue These Medications      Instructions Start Date   amiodarone 200 MG tablet  Commonly known as: PACERONE   200 mg, Oral, 3 Times Daily      apixaban 5 MG tablet tablet  Commonly known as: ELIQUIS   5 mg, Oral, Every 12 Hours Scheduled      atorvastatin 40 MG tablet  Commonly known as: LIPITOR   40 mg, Oral, Nightly      Baqsimi One Pack 3 MG/DOSE powder  Generic drug: Glucagon   3 mg, Nasal, As Needed      bumetanide 1 MG tablet  Commonly known as: BUMEX   1 mg, Oral, Daily      clopidogrel 75 MG  "tablet  Commonly known as: PLAVIX   75 mg, Oral, Daily      folic acid 1 MG tablet  Commonly known as: FOLVITE   1 mg, Oral, Daily      glucose blood test strip   TEST 2 TIMES DAILY AS INSTRUCTED      insulin glargine 100 UNIT/ML injection  Commonly known as: LANT SEMGLEE   10 Units, Subcutaneous, Daily      Insulin Lispro (1 Unit Dial) 100 UNIT/ML solution pen-injector  Commonly known as: HUMALOG   Subcutaneous, 4 Times Daily, SSI: 151-200 2U 201-250 4U 251-300 6U 301-350 8U 351-400 10U      Insulin Pen Needle 29G X 12.7MM misc   ONE MISCELLANEOUS EVERY EVENING      Insulin Syringe-Needle U-100 30G X 1/2\" 0.5 ML misc   USES TWICE A DAY AS DIRECTED.  DX: 250.00      midodrine 5 MG tablet  Commonly known as: PROAMATINE   5 mg, Oral, 3 Times Daily Before Meals      Multivitamin Adult tablet tablet  Generic drug: multivitamin with minerals   1 mg, Oral, Daily      pantoprazole 40 MG EC tablet  Commonly known as: PROTONIX   40 mg, Oral, Daily      tamsulosin 0.4 MG capsule 24 hr capsule  Commonly known as: FLOMAX   0.4 mg, Oral, Daily      thiamine 100 MG tablet  Commonly known as: VITAMIN B1   100 mg, Oral, Daily         Stop These Medications    amoxicillin-clavulanate 500-125 MG per tablet  Commonly known as: AUGMENTIN            Allergies   Allergen Reactions   • Contrast Dye (Echo Or Unknown Ct/Mr) Itching         Discharge Disposition: SNF  Skilled Nursing Facility (DC - External)    Diet:  Hospital:  Diet Order   Procedures   • Diet: Cardiac Diets, Diabetic Diets; Healthy Heart (2-3 Na+); Consistent Carbohydrate; Texture: Regular Texture (IDDSI 7); Fluid Consistency: Thin (IDDSI 0)         Discharge Activity:         CODE STATUS:  Code Status and Medical Interventions:   Ordered at: 03/08/23 1027     Level Of Support Discussed With:    Patient     Code Status (Patient has no pulse and is not breathing):    No CPR (Do Not Attempt to Resuscitate)     Medical Interventions (Patient has pulse or is breathing):    " Full Support         Future Appointments   Date Time Provider Department Center   5/24/2023 12:00 PM [facility]  [facility]   5/24/2023 12:45 PM [facility]  [facility]   5/24/2023  2:00 PM ROOM 1,  [facility]  [facility] None           Time spent on Discharge including face to face service: 30 minutes        Signature: Electronically signed by Marky Funk MD, 03/13/23, 7:42 AM EDT.

## 2023-03-13 NOTE — PLAN OF CARE
Goal Outcome Evaluation:   Hypoglycemia resolved. Pt to be discharged back to [facility] after rounds with MD and CM. Transportation to be arranged.

## 2023-03-13 NOTE — CASE MANAGEMENT/SOCIAL WORK
Case Management Discharge Note      Final Note: [facility].  Dialysis scheduled at [facility].         Selected Continued Care - Admitted Since 3/8/2023     Destination Coordination complete.    Service Provider Selected Services Address Phone Fax Patient Preferred    [facility] [place] [facility] [address] [place] [address] [place] [address] [phone] --           Transportation Services  W/C Van: Skilled Nursing Facility Van    Final Discharge Disposition Code: 04 - intermediate care facility

## 2023-03-13 NOTE — PROGRESS NOTES
"[facility] PROGRESS NOTE     LOS: 5 days    Patient Care Team:  Arnulfo Monroy MD as PCP - General (Internal Medicine)  Kym Aparicio MD as Consulting Physician (Endocrinology)      Subjective   Has no new complaints today.  Denies any nausea or vomiting.  No fever no chills.  Oxygenation stable at 2 L per nasal cannula  Urine output recorded at 600 cc last 24 hours  Objective     Vital Sign Min/Max for last 24 hours  Temp:  [97.4 °F (36.3 °C)-98.2 °F (36.8 °C)] 97.8 °F (36.6 °C)  Heart Rate:  [66-82] 69  Resp:  [10-20] 16  BP: ()/(53-90) 98/58                       Flowsheet Rows    Flowsheet Row First Filed Value   Admission Height 177.8 cm (70\") Documented at 03/08/2023 0637   Admission Weight 74.8 kg (165 lb) Documented at 03/08/2023 0637          No intake/output data recorded.  I/O last 3 completed shifts:  In: 480 [P.O.:480]  Out: 850 [Urine:850]    Physical Exam:  Physical Exam    General Appearance: Chronically ill-appearing, brighter affect and stronger voice today, pleasant  Skin: warm and dry  HEENT: oral mucosa normal, nonicteric sclera  Neck: supple, flat jvp  Lungs: Few scattered wheezes bilaterally  Heart: RRR, normal S1 and S2  Abdomen: soft, nontender, nondistended  : no palpable bladder  Extremities: no edema.    Neuro: normal speech and mental status        LABS:  Lab Results   Component Value Date    CALCIUM 8.2 (L) 03/13/2023    PHOS 6.7 (H) 03/11/2023     Results from last 7 days   Lab Units 03/13/23  0728 03/12/23  0750 03/11/23  1249 03/10/23  0827 03/09/23  1554 03/08/23  1930 03/08/23  0654   SODIUM mmol/L 132* 134* 132* 135*  --    < > 131*   POTASSIUM mmol/L 4.6 5.0 4.7 5.0  --    < > 5.7*   CHLORIDE mmol/L 98 99 97* 99  --    < > 95*   CO2 mmol/L 22.0 20.0* 19.0* 19.0*  --    < > 19.0*   BUN mg/dL 57* 83* 78* 98*  --    < > 98*   CREATININE mg/dL 3.67* 4.40* 4.55* 5.00*  --    < > 4.81*   GLUCOSE mg/dL 309* 251* 310* 198*  --    < > 83   CALCIUM mg/dL 8.2* 8.2* 8.2* " 8.0*  --    < > 8.3*   WBC 10*3/mm3  --   --   --  5.00 4.20  --  4.20   HEMOGLOBIN g/dL  --   --   --  9.0* 8.3*  --  9.3*   PLATELETS 10*3/mm3  --   --   --  316 306  --  360    < > = values in this interval not displayed.     No results found for: CKTOTAL, CKMB, CKMBINDEX, TROPONINI, TROPONINT  Estimated Creatinine Clearance: 20.2 mL/min (A) (by C-G formula based on SCr of 3.67 mg/dL (H)).      Brief Urine Lab Results  (Last result in the past 365 days)      Color   Clarity   Blood   Leuk Est   Nitrite   Protein   CREAT   Urine HCG        03/04/23 2046             68.0         03/04/23 2046 Orange  Comment: Any Substance that causes an abnormal urine color can alter the accuracy of the chemical reactions.   Turbid   Large (3+)   Large (3+)   Negative   100 mg/dL (2+)               WEIGHTS:     Wt Readings from Last 1 Encounters:   03/13/23 0300 82 kg (180 lb 12.4 oz)   03/10/23 0521 78.5 kg (173 lb 1 oz)   03/09/23 0315 76.9 kg (169 lb 8.5 oz)   03/08/23 0637 74.8 kg (165 lb)       amiodarone, 200 mg, Oral, TID  atorvastatin, 40 mg, Oral, Nightly  budesonide, 0.5 mg, Nebulization, BID - RT  clopidogrel, 75 mg, Oral, Daily  folic acid, 1 mg, Oral, Daily  ipratropium-albuterol, 3 mL, Nebulization, 4x Daily - RT  levothyroxine, 25 mcg, Oral, Q AM  linagliptin, 5 mg, Oral, Daily  midodrine, 10 mg, Oral, TID AC  multivitamin with minerals, 1 tablet, Oral, Daily  pantoprazole, 40 mg, Oral, Daily  sodium chloride, 10 mL, Intravenous, Q12H  thiamine, 100 mg, Oral, Daily           Assessment & Plan       1.  Acute kidney injury requiring dialysis  Due to atn, patient had underlying chronic kidney disease prior to that, most likely due to diabetic nephropathy, due for dialysis today  2.  Hypotension.  ACTH stimulation test ordered by endocrinology  3.  Urinary retention.  Diaz cath in place  4.  Hypoglycemia.  5.  CHF.  Patient has underlying diastolic dysfunction and valvular heart disease/aortic stenosis  6.   Hyperkalemia.  Improved with medical management  7.  Hyperphosphatemia    Plan:  Patient dialyzed on Sunday with no reported problems  Plan to dialyze tomorrow and continue TTS schedule dialysis attempt UF as tolerated.  Continue surveillance labs    Lucia Lua MD  03/13/23  09:34 EDT

## 2023-03-13 NOTE — PLAN OF CARE
Problem: Adult Inpatient Plan of Care  Goal: Absence of Hospital-Acquired Illness or Injury  Intervention: Identify and Manage Fall Risk  Recent Flowsheet Documentation  Taken 3/13/2023 0400 by Lesli Oconnell LPN  Safety Promotion/Fall Prevention:   activity supervised   assistive device/personal items within reach   clutter free environment maintained   fall prevention program maintained   gait belt   lighting adjusted   mobility aid in reach   muscle strengthening facilitated   nonskid shoes/slippers when out of bed   room organization consistent   safety round/check completed  Taken 3/13/2023 0200 by Lesli Oconnell LPN  Safety Promotion/Fall Prevention:   activity supervised   assistive device/personal items within reach   clutter free environment maintained   fall prevention program maintained   lighting adjusted   gait belt   mobility aid in reach   muscle strengthening facilitated   nonskid shoes/slippers when out of bed   room organization consistent   safety round/check completed  Taken 3/13/2023 0000 by Lesli Oconnell LPN  Safety Promotion/Fall Prevention:   assistive device/personal items within reach   activity supervised   clutter free environment maintained   fall prevention program maintained   gait belt   lighting adjusted   mobility aid in reach   muscle strengthening facilitated   nonskid shoes/slippers when out of bed   room organization consistent   safety round/check completed  Taken 3/12/2023 2200 by Lesli Oconnell LPN  Safety Promotion/Fall Prevention:   activity supervised   assistive device/personal items within reach   clutter free environment maintained   gait belt   fall prevention program maintained   lighting adjusted   mobility aid in reach   muscle strengthening facilitated   nonskid shoes/slippers when out of bed   room organization consistent   safety round/check completed  Taken 3/12/2023 2000 by Lesli Oconnell LPN  Safety Promotion/Fall Prevention:   assistive  device/personal items within reach   activity supervised   clutter free environment maintained   fall prevention program maintained   gait belt   lighting adjusted   mobility aid in reach   muscle strengthening facilitated   nonskid shoes/slippers when out of bed   room organization consistent   safety round/check completed  Intervention: Prevent Skin Injury  Recent Flowsheet Documentation  Taken 3/13/2023 0400 by Lesli Oconnell LPN  Body Position:   position changed independently   weight shifting  Skin Protection:   adhesive use limited   incontinence pads utilized   protective footwear used   pulse oximeter probe site changed   silicone foam dressing in place   skin sealant/moisture barrier applied   skin-to-skin areas padded   transparent dressing maintained   tubing/devices free from skin contact  Taken 3/13/2023 0200 by Lesli Oconnell LPN  Body Position:   position changed independently   weight shifting  Skin Protection:   adhesive use limited   incontinence pads utilized   silicone foam dressing in place   skin-to-skin areas padded   transparent dressing maintained   tubing/devices free from skin contact   skin sealant/moisture barrier applied  Taken 3/13/2023 0000 by Lesli Oconnell LPN  Body Position:   position changed independently   weight shifting   turned  Skin Protection:   adhesive use limited   incontinence pads utilized   protective footwear used   silicone foam dressing in place   skin sealant/moisture barrier applied   skin-to-skin areas padded   transparent dressing maintained   tubing/devices free from skin contact  Taken 3/12/2023 2200 by Lesli Oconnell LPN  Body Position:   turned   weight shifting  Skin Protection:   adhesive use limited   incontinence pads utilized   protective footwear used   silicone foam dressing in place   skin sealant/moisture barrier applied   skin-to-skin areas padded   transparent dressing maintained   tubing/devices free from skin contact  Taken 3/12/2023 2000  by Stepro, Belana, LPN  Body Position:   neutral body alignment   neutral head position   weight shifting  Skin Protection:   adhesive use limited   incontinence pads utilized   protective footwear used   silicone foam dressing in place   skin sealant/moisture barrier applied   skin-to-skin areas padded   transparent dressing maintained   tubing/devices free from skin contact  Intervention: Prevent and Manage VTE (Venous Thromboembolism) Risk  Recent Flowsheet Documentation  Taken 3/13/2023 0400 by Lesli Oconnell LPN  Activity Management:   activity adjusted per tolerance   activity encouraged  Taken 3/13/2023 0200 by Lesli Oconnell LPN  Activity Management:   activity adjusted per tolerance   activity encouraged  Taken 3/13/2023 0000 by Lesli Oconnell LPN  Activity Management:   activity encouraged   activity adjusted per tolerance  Taken 3/12/2023 2200 by Lesli Oconnell LPN  Activity Management: activity adjusted per tolerance  Taken 3/12/2023 2000 by Lesli Oconnell LPN  Activity Management: activity adjusted per tolerance  VTE Prevention/Management:   compression stockings off   sequential compression devices off   bilateral  Range of Motion: active ROM (range of motion) encouraged  Intervention: Prevent Infection  Recent Flowsheet Documentation  Taken 3/13/2023 0400 by Lesli Oconnell LPN  Infection Prevention:   environmental surveillance performed   equipment surfaces disinfected   hand hygiene promoted   personal protective equipment utilized   single patient room provided   rest/sleep promoted  Taken 3/13/2023 0200 by Lesli Oconnell LPN  Infection Prevention:   environmental surveillance performed   equipment surfaces disinfected   hand hygiene promoted   personal protective equipment utilized   rest/sleep promoted   single patient room provided  Taken 3/13/2023 0000 by Lesli Oconnell LPN  Infection Prevention:   environmental surveillance performed   hand hygiene promoted   equipment surfaces  disinfected   personal protective equipment utilized   rest/sleep promoted   single patient room provided  Taken 3/12/2023 2000 by Lesli Oconnell LPN  Infection Prevention:   environmental surveillance performed   equipment surfaces disinfected   hand hygiene promoted   personal protective equipment utilized   rest/sleep promoted   single patient room provided  Goal: Optimal Comfort and Wellbeing  Intervention: Monitor Pain and Promote Comfort  Recent Flowsheet Documentation  Taken 3/13/2023 0400 by Lesli Oconnell LPN  Pain Management Interventions:   care clustered   quiet environment facilitated  Taken 3/13/2023 0000 by Lesli Oconnell LPN  Pain Management Interventions:   care clustered   position adjusted   pillow support provided   quiet environment facilitated  Taken 3/12/2023 2200 by Lesli Oconnell LPN  Pain Management Interventions:   care clustered   quiet environment facilitated  Taken 3/12/2023 2000 by Lesli Oconnell LPN  Pain Management Interventions:   care clustered   quiet environment facilitated  Intervention: Provide Person-Centered Care  Recent Flowsheet Documentation  Taken 3/12/2023 2000 by Lesli Oconnell LPN  Trust Relationship/Rapport:   care explained   choices provided   empathic listening provided   emotional support provided   questions answered   questions encouraged   reassurance provided   thoughts/feelings acknowledged     Problem: Fall Injury Risk  Goal: Absence of Fall and Fall-Related Injury  Intervention: Identify and Manage Contributors  Recent Flowsheet Documentation  Taken 3/13/2023 0400 by Lesli Oconnell LPN  Medication Review/Management: medications reviewed  Taken 3/13/2023 0200 by Lesli Oconnell LPN  Medication Review/Management: medications reviewed  Taken 3/13/2023 0000 by Lesli Oconnell LPN  Medication Review/Management: medications reviewed  Taken 3/12/2023 2200 by Lesli Oconnell LPN  Medication Review/Management: medications reviewed  Taken 3/12/2023 2000 by  Lesli Oconnell LPN  Medication Review/Management: medications reviewed  Self-Care Promotion: independence encouraged  Intervention: Promote Injury-Free Environment  Recent Flowsheet Documentation  Taken 3/13/2023 0400 by Lesli Oconnell LPN  Safety Promotion/Fall Prevention:   activity supervised   assistive device/personal items within reach   clutter free environment maintained   fall prevention program maintained   gait belt   lighting adjusted   mobility aid in reach   muscle strengthening facilitated   nonskid shoes/slippers when out of bed   room organization consistent   safety round/check completed  Taken 3/13/2023 0200 by Lesli Oconnell LPN  Safety Promotion/Fall Prevention:   activity supervised   assistive device/personal items within reach   clutter free environment maintained   fall prevention program maintained   lighting adjusted   gait belt   mobility aid in reach   muscle strengthening facilitated   nonskid shoes/slippers when out of bed   room organization consistent   safety round/check completed  Taken 3/13/2023 0000 by Lesli Oconnell LPN  Safety Promotion/Fall Prevention:   assistive device/personal items within reach   activity supervised   clutter free environment maintained   fall prevention program maintained   gait belt   lighting adjusted   mobility aid in reach   muscle strengthening facilitated   nonskid shoes/slippers when out of bed   room organization consistent   safety round/check completed  Taken 3/12/2023 2200 by Lesli Oconnell LPN  Safety Promotion/Fall Prevention:   activity supervised   assistive device/personal items within reach   clutter free environment maintained   gait belt   fall prevention program maintained   lighting adjusted   mobility aid in reach   muscle strengthening facilitated   nonskid shoes/slippers when out of bed   room organization consistent   safety round/check completed  Taken 3/12/2023 2000 by Lesli Oconnell LPN  Safety Promotion/Fall  Prevention:   assistive device/personal items within reach   activity supervised   clutter free environment maintained   fall prevention program maintained   gait belt   lighting adjusted   mobility aid in reach   muscle strengthening facilitated   nonskid shoes/slippers when out of bed   room organization consistent   safety round/check completed     Problem: Pain Acute  Goal: Acceptable Pain Control and Functional Ability  Intervention: Prevent or Manage Pain  Recent Flowsheet Documentation  Taken 3/13/2023 0400 by Lesli Oconnell LPN  Medication Review/Management: medications reviewed  Taken 3/13/2023 0200 by Lesli Oconnell LPN  Medication Review/Management: medications reviewed  Taken 3/13/2023 0000 by Lesli Oconnell LPN  Medication Review/Management: medications reviewed  Taken 3/12/2023 2200 by Lesli Oconnell LPN  Medication Review/Management: medications reviewed  Taken 3/12/2023 2000 by Lesli Oconnell LPN  Sleep/Rest Enhancement: awakenings minimized  Medication Review/Management: medications reviewed  Intervention: Develop Pain Management Plan  Recent Flowsheet Documentation  Taken 3/13/2023 0400 by Lesli Oconnell LPN  Pain Management Interventions:   care clustered   quiet environment facilitated  Taken 3/13/2023 0000 by Lesli Oconnell LPN  Pain Management Interventions:   care clustered   position adjusted   pillow support provided   quiet environment facilitated  Taken 3/12/2023 2200 by Lesli Oconnell LPN  Pain Management Interventions:   care clustered   quiet environment facilitated  Taken 3/12/2023 2000 by Lesli Oconnell LPN  Pain Management Interventions:   care clustered   quiet environment facilitated  Intervention: Optimize Psychosocial Wellbeing  Recent Flowsheet Documentation  Taken 3/12/2023 2000 by Lesli Oconnell LPN  Diversional Activities: television     Problem: Breathing Pattern Ineffective  Goal: Effective Breathing Pattern  Intervention: Promote Improved Breathing  Pattern  Recent Flowsheet Documentation  Taken 3/13/2023 0400 by Lesli Oconnell LPN  Head of Bed (HOB) Positioning: HOB at 30-45 degrees  Taken 3/13/2023 0200 by Lesli Oconnell LPN  Head of Bed (HOB) Positioning: HOB at 30-45 degrees  Taken 3/13/2023 0000 by Lesli Oconnell LPN  Head of Bed (HOB) Positioning: HOB at 30-45 degrees  Taken 3/12/2023 2200 by Lesli Oconnell LPN  Head of Bed (HOB) Positioning: HOB at 30-45 degrees  Taken 3/12/2023 2000 by Lesli Oconnell LPN  Head of Bed (HOB) Positioning: HOB at 30-45 degrees     Problem: Skin Injury Risk Increased  Goal: Skin Health and Integrity  Intervention: Optimize Skin Protection  Recent Flowsheet Documentation  Taken 3/13/2023 0400 by Lesli Oconnell LPN  Pressure Reduction Techniques:   frequent weight shift encouraged   heels elevated off bed   positioned off wounds   pressure points protected   rest period provided between sit times   weight shift assistance provided  Head of Bed (HOB) Positioning: HOB at 30-45 degrees  Pressure Reduction Devices:   heel offloading device utilized   positioning supports utilized   pressure-redistributing mattress utilized   specialty bed utilized  Skin Protection:   adhesive use limited   incontinence pads utilized   protective footwear used   pulse oximeter probe site changed   silicone foam dressing in place   skin sealant/moisture barrier applied   skin-to-skin areas padded   transparent dressing maintained   tubing/devices free from skin contact  Taken 3/13/2023 0200 by Lesli Oconnell LPN  Pressure Reduction Techniques:   frequent weight shift encouraged   heels elevated off bed   positioned off wounds   pressure points protected   rest period provided between sit times   weight shift assistance provided  Head of Bed (HOB) Positioning: HOB at 30-45 degrees  Pressure Reduction Devices:   heel offloading device utilized   positioning supports utilized   pressure-redistributing mattress utilized   specialty bed  utilized  Skin Protection:   adhesive use limited   incontinence pads utilized   silicone foam dressing in place   skin-to-skin areas padded   transparent dressing maintained   tubing/devices free from skin contact   skin sealant/moisture barrier applied  Taken 3/13/2023 0000 by Lesli Oconnell LPN  Pressure Reduction Techniques:   rest period provided between sit times   positioned off wounds   pressure points protected   heels elevated off bed   frequent weight shift encouraged   weight shift assistance provided  Head of Bed (HOB) Positioning: HOB at 30-45 degrees  Pressure Reduction Devices:   pressure-redistributing mattress utilized   specialty bed utilized   heel offloading device utilized   positioning supports utilized  Skin Protection:   adhesive use limited   incontinence pads utilized   protective footwear used   silicone foam dressing in place   skin sealant/moisture barrier applied   skin-to-skin areas padded   transparent dressing maintained   tubing/devices free from skin contact  Taken 3/12/2023 2200 by Lesli Oconnell LPN  Pressure Reduction Techniques:   frequent weight shift encouraged   heels elevated off bed   positioned off wounds   pressure points protected   rest period provided between sit times   weight shift assistance provided  Head of Bed (HOB) Positioning: HOB at 30-45 degrees  Pressure Reduction Devices:   pressure-redistributing mattress utilized   specialty bed utilized   heel offloading device utilized   positioning supports utilized  Skin Protection:   adhesive use limited   incontinence pads utilized   protective footwear used   silicone foam dressing in place   skin sealant/moisture barrier applied   skin-to-skin areas padded   transparent dressing maintained   tubing/devices free from skin contact  Taken 3/12/2023 2000 by Lesli Oconnell LPN  Pressure Reduction Techniques:   frequent weight shift encouraged   heels elevated off bed   positioned off wounds   pressure points  protected   rest period provided between sit times   weight shift assistance provided  Head of Bed (HOB) Positioning: HOB at 30-45 degrees  Pressure Reduction Devices:   specialty bed utilized   pressure-redistributing mattress utilized   positioning supports utilized   heel offloading device utilized  Skin Protection:   adhesive use limited   incontinence pads utilized   protective footwear used   silicone foam dressing in place   skin sealant/moisture barrier applied   skin-to-skin areas padded   transparent dressing maintained   tubing/devices free from skin contact   Goal Outcome Evaluation:      Patient is a 78 y/o man admitted for severe hypoglycemia. Patient also has pneumonia. Endocrinology is on board and has ordered to hold all long acting insulin and sliding scale insulin as patient is very sensitive with their blood sugar. Endocrinology has also suggested to try and keep patient's blood sugar within 150-250 range. Patient's glucose has been well this shift with most current reading being 280 and trending down from previous labs.Will continue to monitor. Patient's vitals have been stable throughout current shift, and patient has been overall pleasant. Patient can be forgetful at times. Will continue to monitor patient and their well being during current shift, and strive to attain goals within current care plan during current shift.

## 2023-03-16 NOTE — PROGRESS NOTES
"Enter Query Response Below      Query Response: Acute on chronic HFrEF             If applicable, please update the problem list.   Patient: Holger Corley        : 1945  Account: [id number]           Admit Date: 3/8/2023        How to Respond to this query:       a. Click New Note     b. Answer query within the yellow box.                c. Update the Problem List, if applicable.      If you have any questions about this query contact me at: [email or web address]  [phone]     Dr. Funk:     76 yo M with documented diagnosis of HFpEF in the medical record. No past medical history of CHF noted.  H&P included initial assessment diagnoses of, \"Diastolic dysfunction, non-ischemic cardiomyopathy, aortic sentosis.\" He was noted to have \"BLE pitting and scrotal edema,\" per physical exam. proBNP > 70,000.0 on admission. CXR with impression of \"There are bilateral pulmonary infiltrates which have been noted. Density in the lower lungs in part could relate to underlying pleural effusions. Prominence of perihilar pulmonary markings may be reflective of edema. A multifocal pneumonia not excluded.\" Nephrology consult progress note (3/9/2023) included, \" He was admitted with fluid overload and his renal function has worsened with diuresis. SHAHRAM.\" He underwent tunneled catheter placement and hemodialysis with discharge diagnosis of ESRD.    Can the patient's \"HFpEF\" acuity be further specified as:    Acute HFpEF  Acute on Chronic HFpEF  Chronic HFpEF with fluid overload   Other, please specify: ___________  Unable to determine     By submitting this query, we are merely seeking further clarification of documentation to accurately reflect all conditions that you are monitoring, evaluating, treating or that extend the hospitalization or utilize additional resources of care. Please utilize your independent clinical judgment when addressing the question(s) above.     This query and your response, once completed, will " be entered into the legal medical record.    Sincerely,    NAYAN Guevara, RN,CCDS -   Clinical Documentation Integrity Program

## 2023-03-16 NOTE — PROGRESS NOTES
"Enter Query Response Below      Query Response: HFrEF secondary to severe aortic stenosis             If applicable, please update the problem list.   Patient: Holger Corley        : 1945  Account: [id number]           Admit Date: 3/8/2023        How to Respond to this query:       a. Click New Note     b. Answer query within the yellow box.                c. Update the Problem List, if applicable.      If you have any questions about this query contact me at: [email or web address]  [phone]     Dr. Funk:     76 yo M with diagnoses of HFpEF, HTN, Non-ischemic cardiomyopathy, and Aortic stenosis documented in the medical record. Also noted, \"Recently at [facility] and underwent a cardiac cath, was deemed too frail to undergo a TAVR at that time, especially with progressive renal insufficiency, follows heart failure clinic and they are still proceeding with work-up for possible TAVR in the near future. According to care everywhere records last 2D echo 23 showed EF 33% with moderate to severe aortic stenosis.\" She was also noted to be admitted with \"SHAHRAM due to ATN most likely due to diabetic nephropathy\" by nephrology. The patient underwent tunneled catheter placement and hemodialysis with a discharge diagnosis of ESRD.     Please clarify the etiology for this patient's heart failure as:    -HFpEF due to Hypertensive cardiovascular disease (HCVD)  -HFpEF due to Aortic stenosis  -HFpEF due to Non-Ischemic cardiomyopathy   -All of the above  -Not all of the above, please specify: _____________  -Other, please specify: ____________  -Unable to determine       By submitting this query, we are merely seeking further clarification of documentation to accurately reflect all conditions that you are monitoring, evaluating, treating or that extend the hospitalization or utilize additional resources of care. Please utilize your independent clinical judgment when addressing the question(s) " above.     This query and your response, once completed, will be entered into the legal medical record.    Sincerely,    NAYAN Guevara, RN, CCDS -   Clinical Documentation Integrity Program

## 2023-03-16 NOTE — PROGRESS NOTES
"Enter Query Response Below      Query Response: Bacterial pneumonia, no specific organism or type of organism likely suspected             If applicable, please update the problem list.   Patient: Holger Corley        : 1945  Account: [id number]           Admit Date: 3/8/2023        How to Respond to this query:       a. Click New Note     b. Answer query within the yellow box.                c. Update the Problem List, if applicable.      If you have any questions about this query contact me at: [email or web address]  [phone]     Dr. Funk:     78 yo M with noted history of COPD, DM type 2, Alcoholism, CKD, CHF, and COVID infection with hospitalization in 2023 admitted with \"community-acquired pneumonia.\" H&P included, \"CXR reviewed, worsening opacities, and was dc'd on Augmentin.\" Procalcitonin 0.45. She received IV Zosyn (3/8-3/12).    After study, can the patient's \"community-acquired pneumonia\" be further specified as:    -(likely or suspected) Gram negative pneumonia (excluding Haemophilus influenzae)  - bacterial pneumonia, no specific organism or type of organism likely or suspected  - other ______________________________  - unable to clinically determine      By submitting this query, we are merely seeking further clarification of documentation to accurately reflect all conditions that you are monitoring, evaluating, treating or that extend the hospitalization or utilize additional resources of care. Please utilize your independent clinical judgment when addressing the question(s) above.     This query and your response, once completed, will be entered into the legal medical record.    Sincerely,    NAYAN Guevara, RN, CCDS -   Clinical Documentation Integrity Program     "

## 2023-03-22 NOTE — CONSULTS
Palliative Care Social Work Progress Note    Code Status:do not resuscitate    Goals of Care: Limited Additonal Intervention     Narrative: Palliative care  met with pt to assess for goals of care.  Pt is alert at time of contact and oriented to self, place, and time.  Pt is oriented to his situation somewhat, but seems to have difficulty understanding some of the more abstract elements of his care.  Pt affirms being a DNR, but wants to continue with all aggressive measures.  Per the hospitalist team PA, pt's daughter-in-law (Who is also the pt's healthcare surrogate, documents in Epic) had requested our teams referral to discuss broader goals.  Pt's daughter-in-law, Manasa, was called to address.  Manasa reports that she wants to support whatever the pt wants, but also wants to investigate every possible option.  Hospice discussed and Manasa informed that pt's on dialysis are typically unable to utilize hospice services unless they discontinue dialysis.  She indicated understanding.  She requested more information about the overall state of pt's kidney health, and information about pt's long-term plan for managing his diabetes.  Nephrologist and endocrinologist were messaged via secure chat to invite to family meeting with Manasa and pt tomorrow at 10am.  No other needs assessed by Manasa.  Emotional support provided.      Plan:  -Family meeting at 10am tomorrow, 3/23/23  -Will continue to follow.          DORIE Armstrong

## 2023-03-22 NOTE — H&P
[facility]      Patient Name: Holger Ibrahim  : 1945  MRN: [id number]  Primary Care Physician:  Arnulfo Monroy MD  Date of admission: 3/22/2023      Subjective      Chief Complaint: Hypoglycemic and altered mental status    History of Present Illness: Holger Ibrahim is a 77 y.o. male end-stage renal disease on hemodialysis, DM 2, hypotension, hyperlipidemia who presented to [facility] on 3/22/2023 complaining of altered mental status and hypoglycemia from extended-care facility.  According to record, patient came in with blood sugars less than 20 and altered mental status.  He was initiated on hypoglycemic protocol with improvement.  Patient is now alert and speaking.  He states that last night he had dinner as per usual.  He states that the nursing staff at his extended care facility woke him up and said his blood sugar was low and gave him a glass of orange juice.  This is his third hospitalization for the same issue in 1 month.  Patient states that he is taking his DM2 medicines appropriately given by [facility] facility.  Patient states he is on O2 at his extended care facility but is unsure of the amount.  He is currently on 4 L and denies having shortness of air.  He denies any recent nausea, vomiting, congestion or fever.  He denies having any chest pains.  He denies having any abdominal pain.  He denies any issues with constipation or diarrhea.  He has chronic Diaz catheter in place.  Patient reports heel wound on left heel with history of peripheral vascular disease and bypass.  Patient reports that he does not ambulate.    In the emergency department he was initiated on hypoglycemic protocol, with improvement.  He was initiated on ceftriaxone for UA.  Femoral line started due to poor access elsewhere, PICC line is being considered.  Per RN, daughter called and wants palliative care/hospice consult.  Nephrology has been consulted for  hemodialysis.    Sensitivity troponin T 209, 215 with troponin T delta 6.  Repeat pending.  UA positive, culture pending.  Blood cultures pending.  Glucose currently 196.  Creatinine 4.21.  Potassium 4.3.  Sodium 134.  Lipase 5.  Pro time 18.0.  INR 1.81.  .3.  White blood count 6.10.  Hemoglobin globin 7.7.  Platelets 168.    CXR Bibasilar consolidations which may represent atelectasis/pneumonia, with small to moderate layering pleural effusions, greatest on the right, without significant change.   Suspected background interstitial edema changes, stable.       Review of Systems   All other systems reviewed and are negative.  Except those noted in HPI      Personal History     Past Medical History:   Diagnosis Date   • Alcoholism (HCC)     hx of   • DM2 (diabetes mellitus, type 2) (HCC)    • Hyperlipidemia    • Hypertension    • PVD (peripheral vascular disease) ([facility])     PTA 2017       Past Surgical History:   Procedure Laterality Date   • APPENDECTOMY     • CARDIAC CATHETERIZATION N/A 11/5/2019    Procedure: PERIPHERAL ANGIOGRAPHY, Rt popliteal PTA;  Surgeon: Jonny Ferguson MD;  Location: [facility] CATH INVASIVE LOCATION;  Service: Cardiovascular   • EYE SURGERY Right    • HERNIA REPAIR     • LEG SURGERY  01/2020   • POPLITEAL ARTERY ANGIOPLASTY Right 2017   • SPLENECTOMY      partial spleen removal       Family History: family history includes Cancer in his mother; Hypertension in his brother and father. Otherwise pertinent FHx was reviewed and not pertinent to current issue.    Social History:  reports that he has been smoking cigarettes. He has been smoking an average of .25 packs per day. He has never used smokeless tobacco. He reports that he does not drink alcohol and does not use drugs.    Home Medications:  Prior to Admission Medications     Prescriptions Last Dose Informant Patient Reported? Taking?    amiodarone (PACERONE) 200 MG tablet   Yes No    Take 1 tablet by mouth 3 (Three) Times a  "Day.    apixaban (ELIQUIS) 5 MG tablet tablet   Yes No    Take 1 tablet by mouth Every 12 (Twelve) Hours.    atorvastatin (LIPITOR) 40 MG tablet   Yes No    Take 1 tablet by mouth Every Night.    bumetanide (BUMEX) 1 MG tablet   No No    Take 1 tablet by mouth Daily.    clopidogrel (PLAVIX) 75 MG tablet   Yes No    Take 1 tablet by mouth Daily.    folic acid (FOLVITE) 1 MG tablet   No No    Take 1 tablet by mouth Daily.    Glucagon (Baqsimi One Pack) 3 MG/DOSE powder   Yes No    3 mg into the nostril(s) as directed by provider As Needed.    glucose blood test strip   Yes No    TEST 2 TIMES DAILY AS INSTRUCTED    insulin glargine (LANTUS, SEMGLEE) 100 UNIT/ML injection   Yes No    Inject 10 Units under the skin into the appropriate area as directed Daily.    Insulin Lispro, 1 Unit Dial, (HUMALOG) 100 UNIT/ML solution pen-injector   Yes No    Inject  under the skin into the appropriate area as directed 4 (Four) Times a Day. SSI:  151-200 2U  201-250 4U  251-300 6U  301-350 8U  351-400 10U    Insulin Pen Needle 29G X 12.7MM misc   Yes No    ONE MISCELLANEOUS EVERY EVENING    Insulin Syringe-Needle U-100 30G X 1/2\" 0.5 ML misc   Yes No    USES TWICE A DAY AS DIRECTED.  DX: 250.00    linagliptin (TRADJENTA) 5 MG tablet tablet   No No    Take 1 tablet by mouth Daily for 30 days.    midodrine (PROAMATINE) 5 MG tablet   No No    Take 1 tablet by mouth 3 (Three) Times a Day Before Meals.    Multiple Vitamins-Minerals (MULTIVITAMIN ADULT) tablet   Yes No    Take 1 mg by mouth Daily.    pantoprazole (PROTONIX) 40 MG EC tablet   Yes No    Take 1 tablet by mouth Daily.    thiamine (VITAMIN B1) 100 MG tablet   No No    Take 1 tablet by mouth Daily.            Allergies:  Allergies   Allergen Reactions   • Contrast Dye (Echo Or Unknown Ct/Mr) Itching       Objective      Vitals:   Temp:  [97.7 °F (36.5 °C)] 97.7 °F (36.5 °C)  Heart Rate:  [69-84] 71  Resp:  [14-20] 14  BP: (89-99)/(50-69) 95/50  Flow (L/min):  [4] 4    Physical " Exam  Constitutional:       General: He is not in acute distress.     Appearance: He is ill-appearing.   HENT:      Head: Normocephalic and atraumatic.   Cardiovascular:      Rate and Rhythm: Normal rate and regular rhythm.      Pulses: Normal pulses.   Pulmonary:      Effort: Pulmonary effort is normal. No respiratory distress.      Breath sounds: Normal breath sounds.   Abdominal:      General: Bowel sounds are normal.      Palpations: Abdomen is soft.      Tenderness: There is no abdominal tenderness.   Musculoskeletal:         General: Swelling present. Normal range of motion.      Cervical back: Normal range of motion and neck supple.      Comments: Bilateral heel wounds   Neurological:      Mental Status: He is alert. Mental status is at baseline.   Psychiatric:         Mood and Affect: Mood normal.            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   - Hypoglycemia protocol ordered in ED, monitor, home DM2 meds  - Accu-Cheks before meals and at bedtime  - Healthy heart/consistent carb diet once he passes swallow test, discussed with RN  - A1c 7.4 on 2/23/23  - Nutrition consult  - Endocrine consult    UTI  -UA: Leuk esterase large 3+ bacteria trace protein 3+, follow cultures. May need urinary catheter changed.  -Ceftriaxone initiated in ED, continue  -WBC 6.10  -Blood culture pending    Bibasilar PNA    -CXR Bibasilar consolidations which may represent atelectasis/pneumonia, with small to moderate layering pleural effusions, greatest on the right, without significant change.   Suspected background interstitial edema changes, stable.   -Continue Rocephin  -DuoNebs and Pulmicort  -Currently on 4 L nasal cannula, continue to monitor    ESRD  Anemia of Chronic disease  -Patient dialyzes on Monday, Wednesday and Friday via right-sided tunneled catheter  -Nephrology has been consulted for hemodialysis orders  -Potassium 4.3  -Creatinine 4.21  -hgb 7.7     Bilateral heel wound  -WOCN consulted    H/O HTN  HLD  - BP 95/50  - Continue statin and midodrine    Diastolic dysfunction  Nonischemic cardiomyopathy  Aortic stenosis  Pitting edema  - Sees Dr. Rasheed outpatient  - Recently at [facility] and underwent a cardiac cath, was deemed too frail to undergo a TAVR at that time, especially with progressive renal insufficiency, follows heart failure clinic and they are still proceeding with work-up for possible TAVR in the near future.  - According to care everywhere records last 2D echo 2/1/23 showed EF 33% with moderate to severe aortic stenosis  - Continue home diuretics, may follow nephrology recommendations  - EKG Sinus rhythm. Prolonged [place] interval. Right bundle branch block. Inferior infarct, old  Nonspecific T abnormalities, lateral leads  -HS Troponin T- 209 Delta 6, trend could be associated with volume overload/need for HD  -check BNP    Urinary retention  - Continue chronic Diaz catheter to bedside drainage, present on admission  - According to care everywhere records was followed by urology at recent admission to [facility]  - Continue home Flomax  - U/A positive, follow culture     Proximal A-fib  -EKG showed NSR  -Continue home amiodarone and Eliquis  -monitor BP     COPD  Pulmonary nodule: 13mm RLL with an internal solid 3mm nodule, and a 6mm RLL  Recent COVID infection requiring hospitalization  at [facility] January 2023  - Currently on 4L, titrate as appropriate     Right leg claudication s/p popliteal artery angioplasty 2019    DVT prophylaxis:  Eliquis restarted     CODE STATUS:    Medical Intervention Limits: NO intubation (DNI); NO cardioversion  Code Status (Patient has no pulse and is not breathing): No CPR (Do Not Attempt to Resuscitate)  Medical Interventions (Patient has pulse or is breathing): Limited Support    Admission Status:  I believe this patient meets inpatient status.    I discussed the patient's findings and my recommendations with patient.      Signature: Electronically signed by Teresa Menjivar PA-C, 03/22/23, 10:59 AM EDT.

## 2023-03-22 NOTE — CONSULTS
Call to ED, Spoke with LISSETTE Lopez and ClareRN, still no OK from renal MD for line placement. Said central line bleeding is controlled. Advised to call when approval received for midline placement..

## 2023-03-22 NOTE — Clinical Note
Level of Care: Telemetry [5]   Admitting Physician: TONA HUTTON [[id number]]   Attending Physician: TONA HUTTON [[id number]]   Bed Request Comments: pcu

## 2023-03-22 NOTE — ED PROVIDER NOTES
Subjective   History of Present Illness  Chief complaint: Patient presents unresponsive and altered with a blood glucose of less than 20.  Per report he had been not as active over the last couple of days.  But was found to be unresponsive.  Has a history of diabetes and has been here with low glucose in the past.  Patient cannot provide further history.    Context:    Duration:    Timing:    Severity:    Associated Symptoms:        PCP:  LMP:        Review of Systems   Unable to perform ROS: Acuity of condition       Past Medical History:   Diagnosis Date   • Alcoholism (HCC)     hx of   • DM2 (diabetes mellitus, type 2) (HCC)    • Hyperlipidemia    • Hypertension    • PVD (peripheral vascular disease) (HCC)     PTA 2017       Allergies   Allergen Reactions   • Contrast Dye (Echo Or Unknown Ct/Mr) Itching       Past Surgical History:   Procedure Laterality Date   • APPENDECTOMY     • CARDIAC CATHETERIZATION N/A 11/5/2019    Procedure: PERIPHERAL ANGIOGRAPHY, Rt popliteal PTA;  Surgeon: Jonny Ferguson MD;  Location: [facility] CATH INVASIVE LOC[facility];  Service: Cardiovascular   • EYE SURGERY Right    • HERNIA REPAIR     • LEG SURGERY  01/2020   • POPLITEAL ARTERY ANGIOPLASTY Right 2017   • SPLENECTOMY      partial spleen removal       Family History   Problem Relation Age of Onset   • Cancer Mother    • Hypertension Father    • Hypertension Brother        Social History     Socioeconomic History   • Marital status:    Tobacco Use   • Smoking status: Some Days     Packs/day: 0.25     Types: Cigarettes   • Smokeless tobacco: Never   Vaping Use   • Vaping Use: Never used   Substance and Sexual Activity   • Alcohol use: No   • Drug use: No           Objective   Physical Exam  Vitals and nursing note reviewed.   Constitutional:       Appearance: He is ill-appearing and diaphoretic.      Comments: Unresponsive   HENT:      Mouth/Throat:      Comments: Patient with secretions and airway managing secretions  current  Cardiovascular:      Rate and Rhythm: Normal rate.      Heart sounds: Normal heart sounds.   Pulmonary:      Breath sounds: Rhonchi present.   Abdominal:      Tenderness: There is no abdominal tenderness.   Musculoskeletal:         General: Swelling present.   Neurological:      Comments: Unresponsive         Central Line At Bedside    Date/Time: 3/22/2023 5:59 AM  Performed by: Jed Haider DO  Authorized by: Jed Haider DO     Consent:     Consent obtained:  Emergent situation  Universal protocol:     Immediately prior to procedure, a time out was called: yes      Patient identity confirmed:  Arm band  Pre-procedure details:     Indication(s): central venous access      Hand hygiene: Hand hygiene performed prior to insertion      Sterile barrier technique: All elements of maximal sterile technique followed      Skin preparation:  Chlorhexidine    Skin preparation agent: Skin preparation agent completely dried prior to procedure    Anesthesia:     Anesthesia method:  Local infiltration    Local anesthetic:  Lidocaine 1% w/o epi  Procedure details:     Location:  R femoral    Site selection rationale:  Unsuccessful left IJ    Patient position:  Supine    Procedural supplies:  Triple lumen    Catheter size:  7 Fr    Landmarks identified: yes      Ultrasound guidance: yes      Ultrasound guidance timing: prior to insertion and real time      Sterile ultrasound techniques: Sterile gel and sterile probe covers were used      Number of attempts:  2    Successful placement: yes    Post-procedure details:     Post-procedure:  Dressing applied    Assessment:  Blood return through all ports and free fluid flow    Procedure completion:  Tolerated well, no immediate complications               ED Course           Results for orders placed or performed during the hospital encounter of 03/22/23   Comprehensive Metabolic Panel    Specimen: Blood   Result Value Ref Range    Glucose 196 (H) 65 - 99  mg/dL    BUN 33 (H) 8 - 23 mg/dL    Creatinine 4.21 (H) 0.76 - 1.27 mg/dL    Sodium 134 (L) 136 - 145 mmol/L    Potassium 4.3 3.5 - 5.2 mmol/L    Chloride 98 98 - 107 mmol/L    CO2 26.0 22.0 - 29.0 mmol/L    Calcium 7.8 (L) 8.6 - 10.5 mg/dL    Total Protein 5.6 (L) 6.0 - 8.5 g/dL    Albumin 2.3 (L) 3.5 - 5.2 g/dL    ALT (SGPT) 17 1 - 41 U/L    AST (SGOT) 38 1 - 40 U/L    Alkaline Phosphatase 132 (H) 39 - 117 U/L    Total Bilirubin 0.2 0.0 - 1.2 mg/dL    Globulin 3.3 gm/dL    A/G Ratio 0.7 g/dL    BUN/Creatinine Ratio 7.8 7.0 - 25.0    Anion Gap 10.0 5.0 - 15.0 mmol/L    eGFR 14.0 (L) >60.0 mL/min/1.73   Lipase    Specimen: Blood   Result Value Ref Range    Lipase 5 (L) 13 - 60 U/L   Protime-INR    Specimen: Blood   Result Value Ref Range    Protime 18.0 (H) 9.6 - 11.7 Seconds    INR 1.81 (H) 0.93 - 1.10   aPTT    Specimen: Blood   Result Value Ref Range    .3 (C) 61.0 - 76.5 seconds   Urinalysis With Culture If Indicated - Urine, Catheter    Specimen: Urine, Catheter   Result Value Ref Range    Color, UA Dark Yellow (A) Yellow, Straw    Appearance, UA Turbid (A) Clear    pH, UA <=5.0 5.0 - 8.0    Specific Gravity, UA 1.021 1.005 - 1.030    Glucose, UA Negative Negative    Ketones, UA Trace (A) Negative    Bilirubin, UA Small (1+) (A) Negative    Blood, UA Large (3+) (A) Negative    Protein, UA >=300 mg/dL (3+) (A) Negative    Leuk Esterase, UA Large (3+) (A) Negative    Nitrite, UA Negative Negative    Urobilinogen, UA 1.0 E.U./dL 0.2 - 1.0 E.U./dL   CBC Auto Differential    Specimen: Blood   Result Value Ref Range    WBC 6.10 3.40 - 10.80 10*3/mm3    RBC 2.72 (L) 4.14 - 5.80 10*6/mm3    Hemoglobin 7.7 (L) 13.0 - 17.7 g/dL    Hematocrit 24.0 (L) 37.5 - 51.0 %    MCV 88.2 79.0 - 97.0 fL    MCH 28.2 26.6 - 33.0 pg    MCHC 32.0 31.5 - 35.7 g/dL    RDW 14.9 12.3 - 15.4 %    RDW-SD 49.0 37.0 - 54.0 fl    MPV 9.8 6.0 - 12.0 fL    Platelets 168 140 - 450 10*3/mm3    Neutrophil % 91.0 (H) 42.7 - 76.0 %    Lymphocyte  % 2.1 (L) 19.6 - 45.3 %    Monocyte % 6.1 5.0 - 12.0 %    Eosinophil % 0.4 0.3 - 6.2 %    Basophil % 0.4 0.0 - 1.5 %    Neutrophils, Absolute 5.50 1.70 - 7.00 10*3/mm3    Lymphocytes, Absolute 0.10 (L) 0.70 - 3.10 10*3/mm3    Monocytes, Absolute 0.40 0.10 - 0.90 10*3/mm3    Eosinophils, Absolute 0.00 0.00 - 0.40 10*3/mm3    Basophils, Absolute 0.00 0.00 - 0.20 10*3/mm3    nRBC 0.1 0.0 - 0.2 /100 WBC   Urinalysis, Microscopic Only - Urine, Catheter    Specimen: Urine, Catheter   Result Value Ref Range    RBC, UA 13-20 (A) None Seen /HPF    WBC, UA 13-20 (A) None Seen /HPF    Bacteria, UA Trace (A) None Seen /HPF    Squamous Epithelial Cells, UA 0-2 None Seen, 0-2 /HPF    Yeast, UA Moderate/2+ Yeast None Seen /HPF    Hyaline Casts, UA 0-2 None Seen /LPF    Granular Casts, UA 0-2 None Seen /LPF    Methodology Manual Light Microscopy    High Sensitivity Troponin T    Specimen: Blood   Result Value Ref Range    HS Troponin T 209 (C) <15 ng/L   POC Glucose Once    Specimen: Blood   Result Value Ref Range    Glucose 76 70 - 105 mg/dL   POC Glucose Once    Specimen: Blood   Result Value Ref Range    Glucose 36 (C) 70 - 105 mg/dL   POC Glucose Once    Specimen: Blood   Result Value Ref Range    Glucose 81 70 - 105 mg/dL   POC Glucose Once    Specimen: Blood   Result Value Ref Range    Glucose 64 (L) 70 - 105 mg/dL   ECG 12 Lead Altered Mental Status   Result Value Ref Range    QT Interval 481 ms   Gold Top - SST   Result Value Ref Range    Extra Tube Hold for add-ons.           XR Chest 1 View    Result Date: 3/22/2023  Impression: 1. Bibasilar consolidations which may represent atelectasis/pneumonia, with small to moderate layering pleural effusions, greatest on the right, without significant change. 2. Suspected background interstitial edema changes, stable. Electronically Signed: Fawn Fitzgerald  3/22/2023 7:13 AM EDT  Workstation ID: [id number]                               Medical Decision Making  Patient was seen for  altered mental status    Differential diagnosis includes but is not limited to hypoglycemia, sepsis, UTI, fluid overload    Patient presented hypoglycemic.  He has had recurrent bouts of this recently.  Was discharged on the 13th of the month as well.  He is volume overloaded on exam and and was a very difficult IV access.  Emergently had to place a right femoral central line.  Did attempt left IJ unsuccessfully.  With multiple drops in his glucose he is awake and oriented but generally weak.  Did placed on a D10 drip here in the emergency department.  We will plan to admit to the hospital.  Urine shows signs of infection as well.  Patient states he did do a full dialysis on this past Monday.  However he does appear volume overloaded.  His chest x-ray has excessive fluid on it as well.  So will be admitted and renal to be consulted as well.  Discussed with Rosalba on-call for Dr. Rodrigues who agrees to admit the patient.  She will further work on the patient's persisting hypoglycemia.  As well as hypervolemia.  Antibiotics have been given for urinary tract infection.  Critical care time 55 minutes.              Hypoglycemia: acute illness or injury  Other hypervolemia: acute illness or injury  Amount and/or Complexity of Data Reviewed  Labs: ordered.  Radiology: ordered.  ECG/medicine tests: ordered and independent interpretation performed.     Details: EKG interpreted by myself shows sinus rhythm with prolonged NE interval and right bundle branch block old inferior Q waves and nonspecific T wave changes.  This is compared to prior EKG on 2/23/2023 sinus rhythm with right bundle branch block  Discussion of management or test interpretation with external provider(s): As above    Risk  Prescription drug management.  Decision regarding hospitalization.      Critical Care  Total time providing critical care: 55 minutes      Final diagnoses:   None   Hypoglycemia  Urinary tract infection  Volume overload  ED Disposition  ED  Disposition     None          No follow-up provider specified.       Medication List      No changes were made to your prescriptions during this visit.          Jed Haider,   03/22/23 [id number]

## 2023-03-22 NOTE — PLAN OF CARE
Goal Outcome Evaluation:               Swallow evaluation completed this date. Pt has natural dentition, missing many teeth. Pt awake, alert and following simple body commands for eval. Pt accepts trials of thin water via straw, puree applesauce, mechanical soft mixed consistency peaches and soft to chew fig schuster. Oral transit is WFL for liquids and puree, mildly prolonged w/peaches though WFL. Pt w/prolonged mastication of fig schuster w/coughing following trial. No overt clinical s/s of aspiration demonstrated w/thin water, puree or mechanical soft/ground mixed consistency.     [facility] ground w/thin liquids.   ST to continue to follow for diet tolerance w/further recommendations as indicated.

## 2023-03-22 NOTE — CONSULTS
Traditional midline placed in left upper arm under u/s guidance. Flushes easily and blood return noted. RN notified ok to use. Patient tolerated well.

## 2023-03-22 NOTE — CONSULTS
Spoke with LISSETTE Lopez from ED. Said patient needed to be seen by PICC team due to central line bleeding. Advised that would be unable to respond immediately due to other PICC line commitments, but recommended methods to achieve hemostasis.  Also, would need the OK with nephrologist to place a midline or PICC line.

## 2023-03-22 NOTE — NURSING NOTE
WOCN note:    77 yr old male admitted 3/22/23 from [facility] for hypoglycemia. WOCN consult received for pressure injuries noted upon admission.     Patient is known to this service from a previous admission 2 weeks ago. The sacral and left heel wounds have worsened since that time. He presents today with a full thickness stage 3 pressure injury to his sacrum measuring approximately 2x2cm. There is a silicone bordered foam dressing in place.     Patient also has an unstageable pressure injury to his left heel measuring 4x4cm. The wound is partially covered by a black eschar measuring approximately 3x3cm. There are 2 small deep tissue pressure injuries to the right heel and left lateral foot measuring less than 1cm. A silicone bordered foam dressing was applied to the right heel and a Betadine wet to dry dressing was applied to the right heel. The heels were floated off the bed surface with pillows.    Recommend wound care as above and will order an Agiliti low air loss bed surface. Initiate pressure injury prevention measures including foam off loading boots to the heels, frequent turning and repositioning and skin care for any episode of incontinence. We will continue to follow.

## 2023-03-22 NOTE — THERAPY EVALUATION
Acute Care - Speech Language Pathology   Swallow Initial Evaluation  Kvng     Patient Name: Holger Ibrahim  : 1945  MRN: [id number]  Today's Date: 3/22/2023               Admit Date: 3/22/2023    Visit Dx:     ICD-10-CM ICD-9-CM   1. Hypoglycemia  E16.2 251.2   2. Other hypervolemia  E87.79 276.69     Patient Active Problem List   Diagnosis   • Aortic stenosis, moderate   • Abnormal ankle brachial index (JYOTHI)   • PVD (peripheral vascular disease) with claudication (HCC)   • Diastolic dysfunction   • Essential hypertension   • Hypoglycemia   • Unstageable pressure ulcer of sacral region (HCC)   • Unstageable pressure ulcer of right heel (HCC)   • Unstageable pressure ulcer of left heel (HCC)     Past Medical History:   Diagnosis Date   • Alcoholism (HCC)     hx of   • DM2 (diabetes mellitus, type 2) (HCC)    • Hyperlipidemia    • Hypertension    • PVD (peripheral vascular disease) ([facility])     PTA      Past Surgical History:   Procedure Laterality Date   • APPENDECTOMY     • CARDIAC CATHETERIZATION N/A 2019    Procedure: PERIPHERAL ANGIOGRAPHY, Rt popliteal PTA;  Surgeon: Jonny Ferguson MD;  Location: [facility] INVASIVE LOCATION;  Service: Cardiovascular   • EYE SURGERY Right    • HERNIA REPAIR     • LEG SURGERY  2020   • POPLITEAL ARTERY ANGIOPLASTY Right 2017   • SPLENECTOMY      partial spleen removal       SLP Recommendation and Plan  SLP Swallowing Diagnosis: mild, oral dysphagia, R/O pharyngeal dysphagia (23 1500)  SLP Diet Recommendation: mechanical ground textures, thin liquids (23 1500)  Recommended Precautions and Strategies: upright posture during/after eating, small bites of food and sips of liquid (23 1500)  SLP Rec. for Method of Medication Administration: as tolerated (23 1500)     Monitor for Signs of Aspiration: yes, notify SLP if any concerns, cough, gurgly voice, right lower lobe infiltrates (23 1500)  Recommended Diagnostics: reassess  via clinical swallow evaluation (03/22/23 1500)  Swallow Criteria for Skilled Therapeutic Interventions Met: demonstrates skilled criteria (03/22/23 1500)     Rehab Potential/Prognosis, Swallowing: good, to achieve stated therapy goals (03/22/23 1500)  Therapy Frequency (Swallow): PRN (03/22/23 1500)  Predicted Duration Therapy Intervention (Days): until discharge (03/22/23 1500)                                               SWALLOW EVALUATION (last 72 hours)     SLP Adult Swallow Evaluation     Row Name 03/22/23 1500          Document Type evaluation  -EC    Patient Observations alert;cooperative;agree to therapy  -EC    Patient Effort good  -EC          Patient Profile Reviewed yes  -EC    Pertinent History Of Current Problem Holger Ibrahim is a 77 y.o. male end-stage renal disease on hemodialysis, DM 2, hypotension, hyperlipidemia who presented to [facility] on 3/22/2023 complaining of altered mental status and [facility]. CXR: Bibasilar consolidations which may represent atelectasis/pneumonia, with small to moderate layering pleural effusions, greatest on the right, without significant change. Suspected background interstitial edema changes, stable.  -EC    Current Method of Nutrition regular textures;thin liquids  -EC    Prior Level of Function-Swallowing regular textures;thin liquids  Pt reports eating sausage at breakfast and chicken at lunch, states it is not chopped/ground  -EC    Plans/Goals Discussed with patient  -EC          Additional Documentation --  no pain reported at eval  -EC          Dentition Assessment missing teeth;teeth are in poor condition  -EC    Secretion Management WNL/WFL  -EC    Mucosal Quality moist, healthy  -EC          Oral Motor General Assessment WFL  -EC          Respiratory Support Currently in Use nasal cannula  -EC    O2 Liters 4L  -EC    Eating/Swallowing Skills fed by SLP  -EC    Positioning During Eating upright in bed  -EC     Utensils Used spoon;straw  -EC    Consistencies Trialed soft to chew textures;chopped;mixed consistency;pureed;thin liquids  -EC          Clinical Swallow Evaluation Summary Swallow evaluation completed this date. Pt has natural dentition, missing many teeth. Pt awake, alert and following simple body commands for eval. Pt accepts trials of thin water via straw, puree applesauce, mechanical soft mixed consistency peaches and soft to chew fig schuster. Oral transit is WFL for liquids and puree, mildly prolonged w/peaches though WFL. Pt w/prolonged mastication of fig schuster w/coughing following trial. No overt clinical s/s of aspiration demonstrated w/thin water, puree or mechanical soft/ground mixed consistency. Recommend mech ground w/thin liquids. ST to continue to follow for diet tolerance w/further recommendations as indicated.  -EC          SLP Swallowing Diagnosis mild;oral dysphagia;R/O pharyngeal dysphagia  -EC    Functional Impact risk of aspiration/pneumonia  -EC    Rehab Potential/Prognosis, Swallowing good, to achieve stated therapy goals  -EC    Swallow Criteria for Skilled Therapeutic Interventions Met demonstrates skilled criteria  -EC          Care Plan Review evaluation/treatment results reviewed;patient/other agree to care plan  -EC          Therapy Frequency (Swallow) PRN  -EC    Predicted Duration Therapy Intervention (Days) until discharge  -EC    SLP Diet Recommendation mechanical ground textures;thin liquids  -EC    Recommended Diagnostics reassess via clinical swallow evaluation  -EC    Recommended Precautions and Strategies upright posture during/after eating;small bites of food and sips of liquid  -EC    Oral Care Recommendations Oral Care BID/PRN  -EC    SLP Rec. for Method of Medication Administration as tolerated  -EC    Monitor for Signs of Aspiration yes;notify SLP if any concerns;cough;gurgly voice;right lower lobe infiltrates  -EC          Swallow LTGs Swallow Long Term Goal (free text)   -EC    Swallow STGs diet tolerance goal selection (SLP)  -EC    Diet Tolerance Goal Selection (SLP) Swallow Short Term Goal 1  -EC          (LTG) Swallow The patient will maximize swallow function for least restrictive po diet, exhibiting no complications associated with dysphagia, adequate po intake, and demonstrating independent use of safe swallow compensations.  -EC    [place] (Swallow Long Term Goal) with minimal cues (75-90% accuracy)  -EC    Time Frame (Swallow Long Term Goal) by discharge  -EC          (STG) Swallow 1 The patient will participate in ongoing assessment of swallow, including re-evaluation clinically and/or including instrumental assessment of swallow if indicated, to further assess swallow function  -EC    [place] (Swallow Short Term Goal 1) with minimal cues (75-90% accuracy)  -EC    Time Frame (Swallow Short Term Goal 1) 1 week  -EC          User Key  (r) = Recorded By, (t) = Taken By, (c) = Cosigned By    Initials Name Effective Dates    EC Phoebe Keisha 06/16/21 -                 EDUCATION  The patient has been educated in the following areas:   Dysphagia (Swallowing Impairment).        SLP GOALS     Row Name 03/22/23 1500             (LTG) Swallow    (LTG) Swallow The patient will maximize swallow function for least restrictive po diet, exhibiting no complications associated with dysphagia, adequate po intake, and demonstrating independent use of safe swallow compensations.  -EC      [place] (Swallow Long Term Goal) with minimal cues (75-90% accuracy)  -EC      Time Frame (Swallow Long Term Goal) by discharge  -EC         (STG) Swallow 1    (STG) Swallow 1 The patient will participate in ongoing assessment of swallow, including re-evaluation clinically and/or including instrumental assessment of swallow if indicated, to further assess swallow function  -EC      [place] (Swallow Short Term Goal 1) with minimal cues (75-90% accuracy)  -EC      Time Frame (Swallow  Short Term Goal 1) 1 week  -EC            User Key  (r) = Recorded By, (t) = Taken By, (c) = Cosigned By    Initials Name Provider Type    Keisha Vital Speech and Language Pathologist                   Time Calculation:                Keisha Sandhu  3/22/2023

## 2023-03-22 NOTE — PROGRESS NOTES
Dr. Lua added to treatment team instead of Dr. Beauchapm for unclear reasons.  Consultant adjusted and Dr Beauchamp notified of admission.

## 2023-03-22 NOTE — CASE MANAGEMENT/SOCIAL WORK
Discharge Planning Assessment   Kvng     Patient Name: Holger Ibrahim  MRN: [id number]  Today's Date: 3/22/2023    Admit Date: 3/22/2023    [facility] No precert required. Current dialysis at  [facility]. Pt will need transportation provided by [employer] at d/c.   Discharge Needs Assessment     Row Name 03/22/23 1200       Living Environment    People in Home facility resident    Current Living Arrangements extended care facility    Primary Care Provided by self    Provides Primary Care For no one, unable/limited ability to care for self    Family Caregiver if Needed child(elena), adult    Quality of Family Relationships supportive    Able to Return to Prior Arrangements yes       Resource/Environmental Concerns    Resource/Environmental Concerns none    Transportation Concerns none       Transition Planning    Patient/Family Anticipates Transition to long-term care facility    Patient/Family Anticipated Services at Transition skilled nursing    Transportation Anticipated health plan transportation       Discharge Needs Assessment    Readmission Within the Last 30 Days previous discharge plan unsuccessful    Current Outpatient/Agency/Support Group skilled nursing facility    Equipment Currently Used at Home wheelchair;walker, rolling    Concerns to be Addressed discharge planning    Anticipated Changes Related to Illness none    Equipment Needed After Discharge none    Outpatient/Agency/Support Group Needs skilled nursing facility               Discharge Plan     Row Name 03/22/23 1201       Plan    Plan [facility]. No precert required. Current dialysis at  [facility]. Pt will need transportation provided by [employer] at d/c.    Patient/Family in Agreement with Plan yes    Plan Comments Unable to perform CM assessment in ED. Called and spoke with patient daughter-in-law, Manasa Gonzales. [facility] and will want to return at discharge per  daughter-in-law. Verified with liaison at facility patient can return, no precert or PT eval required for return. Facility pharmacy updated in Epic. Pt uses [facility]W and WC at facility. He will need transportation provided by [facility]. Currently receives dialysis at facility [facility]. Endocrinology, Nephrology, Palliative consults pending. IV abx - UTI, pending blood cultures, D10 gtt.              Continued Care and Services - Admitted Since 3/22/2023     Destination     Service Provider Request Status Selected Services Address Phone Fax Patient Preferred    [facility] [place] Pending - Request Sent N/A [address] [place] [address] [phone] --            Selected Continued Care - Prior Encounters Includes continued care and service providers with selected services from prior encounters from 12/22/2022 to 3/22/2023    Discharged on 3/13/2023 Admission date: 3/8/2023 - Discharge disposition: Skilled Nursing Facility (DC - External)    Destination     Service Provider Selected Services Address Phone Fax Patient Preferred    [facility] [place] [address] [place] [address] [place] [address] [phone] --                Discharged on 3/7/2023 Admission date: 2/23/2023 - Discharge disposition: Skilled Nursing Facility (DC - External)    Destination     Service Provider Selected Services Address Phone Fax Patient Preferred    [facility] [place] Skilled Nursing [address] [place] [address] [place] [address] [phone] --       Internal Comment last updated by Naegele, Megan, RN 3/7/2023 1625    Return as skilled per Yolanda                               Expected Discharge Date and Time     Expected Discharge Date Expected Discharge Time    Mar 27, 2023          Demographic Summary     Row Name 03/22/23 1159       General Information    Admission Type inpatient    Arrived From emergency department    Referral  Source emergency department    Reason for Consult discharge planning    Preferred Language English               Functional Status     Row Name 03/22/23 1159       Functional Status    Usual Activity Tolerance moderate    Current Activity Tolerance moderate       Functional Status, IADL    Medications assistive person    Meal Preparation assistive person    Housekeeping assistive person    Laundry assistive person    Shopping assistive person       Mental Status Summary    Recent Changes in Mental Status/Cognitive Functioning unable to assess                      Debora Clifford RN

## 2023-03-22 NOTE — NURSING NOTE
Nursing report ED to floor  Holger QUEENIE Blaine  77 y.o.  male    HPI:   Chief Complaint   Patient presents with   • Hypoglycemia       Admitting doctor:   Segun Rodrigues MD    Admitting diagnosis:   The primary encounter diagnosis was Hypoglycemia. A diagnosis of Other hypervolemia was also pertinent to this visit.    Code status:   Current Code Status     Date Active Code Status Order ID Comments User Context       3/22/2023 1027 No CPR (Do Not Attempt to Resuscitate) [id number]  Teresa Menjivar, [facility]      Question Answer    Code Status (Patient has no pulse and is not breathing) No CPR (Do Not Attempt to Resuscitate)    Medical Interventions (Patient has pulse or is breathing) Limited Support    Medical Intervention Limits: NO intubation (DNI)     NO cardioversion                Allergies:   Contrast dye (echo or unknown ct/mr)    Isolation:  Contact     Fall Risk:  Fall Risk Assessment was completed, and patient is at high risk for falls.   Predictive Model Details         79 (High) Factor Value    Calculated 3/22/2023 14:10 Age 77    Risk of Fall Model Active Peripheral IV Present     Imaging order in this encounter Present     Number of Distinct Medication Classes administered 8     Musculoskeletal Assessment X     Diastolic BP 50     Albumin 2.3 g/dL     Magnesium not on file     Calcium 7.8 mg/dL     Financial Class Medicare     Nicholas Scale 14     Drug Use No     Creatinine 4.21 mg/dL     Tobacco Use Current     Chloride 98 mmol/L     Clinically Relevant Sex Not Female     Gastrointestinal Assessment WDL     Days after Admission 0.409     Cardiac Assessment WDL     Respiratory Rate 14     Number of administrations of Anti-Coagulants 1     Skin Assessment X     Peripheral Vascular Assessment X     Potassium 4.3 mmol/L     Total Protein 5.6 g/dL     Total Bilirubin 0.2 mg/dL     ALT 17 U/L         Weight:       03/22/23  0413   Weight: 82 kg (180 lb 12.4 oz)       Intake and Output    Intake/Output Summary (Last  24 hours) at 3/22/2023 1437  Last data filed at 3/22/2023 1331  Gross per 24 hour   Intake 340 ml   Output --   Net 340 ml       Diet:   Dietary Orders (From admission, onward)     Start     Ordered    03/22/23 1027  Diet: Cardiac Diets, Diabetic Diets; Healthy Heart (2-3 Na+); Consistent Carbohydrate; Texture: Regular Texture (IDDSI 7); Fluid Consistency: Thin (IDDSI 0)  Diet Effective Now        References:    Diet Order [place]   Question Answer Comment   Diets: Cardiac Diets    Diets: Diabetic Diets    Cardiac Diet: Healthy Heart (2-3 Na+)    Diabetic Diet: Consistent Carbohydrate    Texture: Regular Texture (IDDSI 7)    Fluid Consistency: Thin (IDDSI 0)        03/22/23 1026                 Most recent vitals:   Vitals:    03/22/23 1041 03/22/23 1130 03/22/23 1135 03/22/23 1400   BP:   (!) 85/50 (!) 81/55   BP Location:       Pulse: 71 70 71    Resp:  14 14    Temp:       TempSrc:       SpO2: 100% 98% 100% 100%   Weight:       Height:           Active LDAs/IV Access:   Lines, Drains & Airways     Active LDAs     Name Placement date Placement time Site Days    CVC Triple Lumen 03/22/23 Right Femoral 03/22/23  0601  Femoral  less than 1    Peripheral IV 03/22/23 0942 Left;Posterior Hand 03/22/23  0942  Hand  less than 1    Urethral Catheter Coude 16 Fr. 03/02/23  1230  -- 20    Hemodialysis Cath Double 03/10/23  1518  Internal Jugular  11                Skin Condition:   Skin Assessments (last day)     Date/Time Skin WDL Skin Color/Characteristics Skin Temperature Skin Moisture Skin Elasticity Skin Integrity Sensory Perception Moisture Activity Mobility Nutrition Friction and Shear Nicholas Score    03/22/23 1135 X;color;characteristics dusky;other (see comments)  warm flaky;dry slow return to original state wound;pressure injury;cracked;abrasion;bruised (ecchymotic) 3-->slightly limited 3-->occasionally moist 2-->chairfast 2-->very limited 2-->probably inadequate 2-->potential problem 14    Skin  Color/Characteristics: coccyx with wound, blateral heels, stratches on bilateral arms, scrotum with open area (on LDA) wound care consulted at 03/22/23 1135           Labs (abnormal labs have a star):   Labs Reviewed   COMPREHENSIVE METABOLIC PANEL - Abnormal; Notable for the following components:       Result Value    Glucose 196 (*)     BUN 33 (*)     Creatinine 4.21 (*)     Sodium 134 (*)     Calcium 7.8 (*)     Total Protein 5.6 (*)     Albumin 2.3 (*)     Alkaline Phosphatase 132 (*)     eGFR 14.0 (*)     All other components within normal limits    Narrative:     GFR Normal >60  Chronic Kidney Disease <60  Kidney Failure <15    The GFR formula is only valid for adults with stable renal function between ages 18 and 70.   LIPASE - Abnormal; Notable for the following components:    Lipase 5 (*)     All other components within normal limits   PROTIME-INR - Abnormal; Notable for the following components:    Protime 18.0 (*)     INR 1.81 (*)     All other components within normal limits   APTT - Abnormal; Notable for the following components:    .3 (*)     All other components within normal limits   URINALYSIS W/ CULTURE IF INDICATED - Abnormal; Notable for the following components:    Color, UA Dark Yellow (*)     Appearance, UA Turbid (*)     Ketones, UA Trace (*)     Bilirubin, UA Small (1+) (*)     Blood, UA Large (3+) (*)     Protein, UA >=300 mg/dL (3+) (*)     Leuk Esterase, UA Large (3+) (*)     All other components within normal limits    Narrative:     In absence of clinical symptoms, the presence of pyuria, bacteria, and/or nitrites on the urinalysis result does not correlate with infection.   CBC WITH AUTO DIFFERENTIAL - Abnormal; Notable for the following components:    RBC 2.72 (*)     Hemoglobin 7.7 (*)     Hematocrit 24.0 (*)     Neutrophil % 91.0 (*)     Lymphocyte % 2.1 (*)     Lymphocytes, Absolute 0.10 (*)     All other components within normal limits   URINALYSIS, MICROSCOPIC ONLY -  Abnormal; Notable for the following components:    RBC, UA 13-20 (*)     WBC, UA 13-20 (*)     Bacteria, UA Trace (*)     All other components within normal limits   TROPONIN - Abnormal; Notable for the following components:    HS Troponin T 209 (*)     All other components within normal limits    Narrative:     High Sensitive Troponin T Reference Range:  <10.0 ng/L- Negative Female for AMI  <15.0 ng/L- Negative Male for AMI  >=10 - Abnormal Female indicating possible myocardial injury.  >=15 - Abnormal Male indicating possible myocardial injury.   Clinicians would have to utilize clinical acumen, EKG, Troponin, and serial changes to determine if it is an Acute Myocardial Infarction or myocardial injury due to an underlying chronic condition.        HIGH SENSITIVITIY TROPONIN T 2HR - Abnormal; Notable for the following components:    HS Troponin T 215 (*)     Troponin T Delta 6 (*)     All other components within normal limits    Narrative:     High Sensitive Troponin T Reference Range:  <10.0 ng/L- Negative Female for AMI  <15.0 ng/L- Negative Male for AMI  >=10 - Abnormal Female indicating possible myocardial injury.  >=15 - Abnormal Male indicating possible myocardial injury.   Clinicians would have to utilize clinical acumen, EKG, Troponin, and serial changes to determine if it is an Acute Myocardial Infarction or myocardial injury due to an underlying chronic condition.        TROPONIN - Abnormal; Notable for the following components:    HS Troponin T 169 (*)     All other components within normal limits    Narrative:     High Sensitive Troponin T Reference Range:  <10.0 ng/L- Negative Female for AMI  <15.0 ng/L- Negative Male for AMI  >=10 - Abnormal Female indicating possible myocardial injury.  >=15 - Abnormal Male indicating possible myocardial injury.   Clinicians would have to utilize clinical acumen, EKG, Troponin, and serial changes to determine if it is an Acute Myocardial Infarction or myocardial  injury due to an underlying chronic condition.        POCT GLUCOSE FINGERSTICK - Abnormal; Notable for the following components:    Glucose 36 (*)     All other components within normal limits   POCT GLUCOSE FINGERSTICK - Abnormal; Notable for the following components:    Glucose 64 (*)     All other components within normal limits   URINE CULTURE - Normal   POCT GLUCOSE FINGERSTICK - Normal   POCT GLUCOSE FINGERSTICK - Normal   POCT GLUCOSE FINGERSTICK - Normal   POCT GLUCOSE FINGERSTICK - Normal   POCT GLUCOSE FINGERSTICK - Normal   POCT GLUCOSE FINGERSTICK - Normal   POCT GLUCOSE FINGERSTICK - Normal   BLOOD CULTURE   BLOOD CULTURE   CANDIDA AURIS SCREEN   POCT GLUCOSE FINGERSTICK   POCT GLUCOSE FINGERSTICK   CBC AND DIFFERENTIAL    Narrative:     The following orders were created for panel order CBC & Differential.  Procedure                               Abnormality         Status                     ---------                               -----------         ------                     CBC Auto Differential[[id number]]        Abnormal            Final result                 Please view results for these tests on the individual orders.   EXTRA TUBES    Narrative:     The following orders were created for panel order Extra Tubes.  Procedure                               Abnormality         Status                     ---------                               -----------         ------                     [facility] - [facility][[id number]]                                   Final result                 Please view results for these tests on the individual orders.   GOLD TOP - SST       LOC: Person, Place and Situation    Telemetry:  Telemetry    Cardiac Monitoring Ordered: yes    EKG:   ECG 12 Lead Altered Mental Status   Preliminary Result   HEART RATE= 75  bpm   RR Interval= 804  ms   MO Interval= 263  ms   P Horizontal Axis= -68  deg   P Front Axis= 0  deg   QRSD Interval= 138  ms   QT Interval= 481  ms   QRS Axis= -73  deg    T Wave Axis=   deg   - ABNORMAL ECG -   Sinus rhythm   Prolonged CT interval   Right bundle branch block   Inferior infarct, old   Nonspecific T abnormalities, lateral leads   Electronically Signed By:    Date and Time of Study: 2023-03-22 06:44:56          Medications Given in the ED:   Medications   dextrose (D50W) (25 g/50 mL) IV injection 50 mL (50 mL Intravenous Not Given 3/22/23 0758)   sodium chloride 0.9 % flush 10 mL (has no administration in time range)   dextrose 10 % infusion (75 mL/hr Intravenous Currently Infusing 3/22/23 1331)   sodium chloride 0.9 % flush 10 mL (10 mL Intravenous Not Given 3/22/23 1112)   sodium chloride 0.9 % flush 10 mL (has no administration in time range)   sodium chloride 0.9 % infusion 40 mL (has no administration in time range)   acetaminophen (TYLENOL) tablet 650 mg (has no administration in time range)     Or   acetaminophen (TYLENOL) 160 MG/5ML solution 650 mg (has no administration in time range)     Or   acetaminophen (TYLENOL) suppository 650 mg (has no administration in time range)   aluminum-magnesium hydroxide-simethicone (MAALOX MAX) 400-400-40 MG/5ML suspension 15 mL (has no administration in time range)   sennosides-docusate (PERICOLACE) 8.6-50 MG per tablet 2 tablet (2 tablets Oral Not Given 3/22/23 1129)     And   polyethylene glycol (MIRALAX) packet 17 g (has no administration in time range)     And   bisacodyl (DULCOLAX) EC tablet 5 mg (has no administration in time range)     And   bisacodyl (DULCOLAX) suppository 10 mg (has no administration in time range)   ondansetron (ZOFRAN) tablet 4 mg (has no administration in time range)     Or   ondansetron (ZOFRAN) injection 4 mg (has no administration in time range)   melatonin tablet 5 mg (has no administration in time range)   amiodarone (PACERONE) tablet 200 mg (has no administration in time range)   apixaban (ELIQUIS) tablet 5 mg (has no administration in time range)   atorvastatin (LIPITOR) tablet 40 mg (has  no administration in time range)   bumetanide (BUMEX) tablet 1 mg (has no administration in time range)   clopidogrel (PLAVIX) tablet 75 mg (has no administration in time range)   folic acid (FOLVITE) tablet 1 mg (has no administration in time range)   pantoprazole (PROTONIX) EC tablet 40 mg (has no administration in time range)   thiamine (VITAMIN B-1) tablet 100 mg (has no administration in time range)   tamsulosin (FLOMAX) 24 hr capsule 0.4 mg (has no administration in time range)   ipratropium-albuterol (DUO-NEB) nebulizer solution 3 mL (3 mL Nebulization Given 3/22/23 1130)   budesonide (PULMICORT) nebulizer solution 0.25 mg (0.25 mg Nebulization Not Given 3/22/23 1148)   midodrine (PROAMATINE) tablet 10 mg (10 mg Oral Given 3/22/23 1301)   povidone-iodine (BETADINE) external solution ( Topical Not Given 3/22/23 1313)   cefTRIAXone (ROCEPHIN) 1 g in sodium chloride 0.9 % 100 mL IVPB (has no administration in time range)   glucagon (human recombinant) (GLUCAGEN DIAGNOSTIC) 1 mg in sterile water (preservative free) 1 mL injection (1 mg Intramuscular Given 3/22/23 0424)   heparin injection 2,500 Units (2,500 Units Intracatheter Given 3/22/23 0441)   cefTRIAXone (ROCEPHIN) 2 g in sodium chloride 0.9 % 100 mL IVPB (0 g Intravenous Stopped 3/22/23 0830)   dextrose (D50W) (25 g/50 mL) IV injection 25 g (25 g Intravenous Given 3/22/23 0757)   sodium chloride 0.9 % bolus 250 mL (250 mL Intravenous New Bag 3/22/23 1229)       Imaging results:  XR Chest 1 View    Result Date: 3/22/2023  Impression: 1. Bibasilar consolidations which may represent atelectasis/pneumonia, with small to moderate layering pleural effusions, greatest on the right, without significant change. 2. Suspected background interstitial edema changes, stable. Electronically Signed: Fawn Fitzgerald  3/22/2023 7:13 AM EDT  Workstation ID: [id number]      Social issues:   Social History     Socioeconomic History   • Marital status:    Tobacco Use   •  Smoking status: Some Days     Packs/day: 0.25     Types: Cigarettes   • Smokeless tobacco: Never   Vaping Use   • Vaping Use: Never used   Substance and Sexual Activity   • Alcohol use: No   • Drug use: No       NIH Stroke Scale:  Interval: (not recorded)  1a. Level of Consciousness: (not recorded)  1b. LOC Questions: (not recorded)  1c. LOC Commands: (not recorded)  2. Best Gaze: (not recorded)  3. Visual: (not recorded)  4. Facial Palsy: (not recorded)  5a. Motor Arm, Left: (not recorded)  5b. Motor Arm, Right: (not recorded)  6a. Motor Leg, Left: (not recorded)  6b. Motor Leg, Right: (not recorded)  7. Limb Ataxia: (not recorded)  8. Sensory: (not recorded)  9. Best Language: (not recorded)  10. Dysarthria: (not recorded)  11. Extinction and Inattention (formerly Neglect): (not recorded)    Total (NIH Stroke Scale): (not recorded)     Additional notable assessment information:     Nursing report ED to floor:  [facility]    Roxy Calzada RN   03/22/23 14:37 EDT

## 2023-03-22 NOTE — CONSULTS
[facility] CONSULT NOTE    Referring Provider: Dr. Segun Rodrigues  Reason for Consultation: Renal insufficiency    Chief complaint.  Altered mental status    History of present illness:    Patient is 77 years old male well-known to me and was recently started on dialysis due to acute kidney injury on chronic kidney disease, diabetes, proteinuria, chronic hypertension, was brought to the hospital from nursing home due to altered mental status and patient was found to have hypoglycemia.  Patient was also hypoxic.  On oxygen via nasal cannula.  Patient has some dyspnea but denies any fever, cough, expectoration, nausea, vomiting.  Patient has chronic indwelling Diaz catheter due to urinary retention.  Chest x-ray showed bilateral consolidation with some mild to moderate pleural effusions as per HPI    History  Past Medical History:   Diagnosis Date   • Alcoholism (HCC)     hx of   • DM2 (diabetes mellitus, type 2) (HCC)    • Hyperlipidemia    • Hypertension    • PVD (peripheral vascular disease) ([facility])     PTA 2017     Past Surgical History:   Procedure Laterality Date   • APPENDECTOMY     • CARDIAC CATHETERIZATION N/A 11/5/2019    Procedure: PERIPHERAL ANGIOGRAPHY, Rt popliteal PTA;  Surgeon: Jonny Ferguson MD;  Location: [facility] INVASIVE LOCATION;  Service: Cardiovascular   • EYE SURGERY Right    • HERNIA REPAIR     • LEG SURGERY  01/2020   • POPLITEAL ARTERY ANGIOPLASTY Right 2017   • SPLENECTOMY      partial spleen removal     Social History     Tobacco Use   • Smoking status: Some Days     Packs/day: 0.25     Types: Cigarettes   • Smokeless tobacco: Never   Vaping Use   • Vaping Use: Never used   Substance Use Topics   • Alcohol use: No   • Drug use: No     Family History   Problem Relation Age of Onset   • Cancer Mother    • Hypertension Father    • Hypertension Brother        Review of Systems  ROS  As per HPI  Objective     Vital Signs  Temp:  [97.7 °F (36.5 °C)] 97.7 °F (36.5 °C)  Heart Rate:   [69-84] 71  Resp:  [14-20] 14  BP: (81-99)/(50-69) 81/55    I/O this shift:  In: 340 [P.O.:240; IV Piggyback:100]  Out: -   No intake/output data recorded.    Physical Exam:  Physical Exam    General Appearance: Chronically ill-appearing  Skin: warm and dry  HEENT: oral mucosa normal, nonicteric sclera  Neck: supple, no JVD  Lungs: Few scattered rhonchi  Heart: RRR, normal S1 and S2  Abdomen: soft, nontender, nondistended  : no palpable bladder  Extremities: 1+ bilateral lower extreme edema  Neuro: normal speech and mental status     Results Review:   I reviewed the patient's new clinical results.    Lab Results   Component Value Date    CALCIUM 7.8 (L) 03/22/2023    PHOS 6.7 (H) 03/11/2023     Results from last 7 days   Lab Units 03/22/23  0618   SODIUM mmol/L 134*   POTASSIUM mmol/L 4.3   CHLORIDE mmol/L 98   CO2 mmol/L 26.0   BUN mg/dL 33*   CREATININE mg/dL 4.21*   GLUCOSE mg/dL 196*   CALCIUM mg/dL 7.8*   WBC 10*3/mm3 6.10   HEMOGLOBIN g/dL 7.7*   PLATELETS 10*3/mm3 168   ALT (SGPT) U/L 17   AST (SGOT) U/L 38     Lab Results   Component Value Date    TROPONINT 169 (C) 03/22/2023     Estimated Creatinine Clearance: 17.6 mL/min (A) (by C-G formula based on SCr of 4.21 mg/dL (H)).No results found for: URICACID    Brief Urine Lab Results  (Last result in the past 365 days)      Color   Clarity   Blood   Leuk Est   Nitrite   Protein   CREAT   Urine HCG        03/22/23 0626 Dark Yellow   Turbid   Large (3+)   Large (3+)   Negative   >=300 mg/dL (3+)                 Prior to Admission medications    Medication Sig Start Date End Date Taking? Authorizing Provider   amiodarone (PACERONE) 200 MG tablet Take 1 tablet by mouth 3 (Three) Times a Day. 2/13/23  Yes Provider, MD Benjamin   apixaban (ELIQUIS) 5 MG tablet tablet Take 1 tablet by mouth Every 12 (Twelve) Hours. 2/13/23  Yes Provider, MD Benjamin   atorvastatin (LIPITOR) 40 MG tablet Take 1 tablet by mouth Every Night. 3/8/17  Yes Provider, MD Benjamin  "  bumetanide (BUMEX) 1 MG tablet Take 1 tablet by mouth Daily. 3/8/23  Yes Britney Joiner DO   clopidogrel (PLAVIX) 75 MG tablet Take 1 tablet by mouth Daily. 4/13/17  Yes Benjamin Bermudez MD   folic acid (FOLVITE) 1 MG tablet Take 1 tablet by mouth Daily. 3/8/23  Yes Britney Joiner DO   insulin glargine (LANTUS, SEMGLEE) 100 UNIT/ML injection Inject 5 Units under the skin into the appropriate area as directed Daily.   Yes Benjamin Bermudez MD   Insulin Lispro, 1 Unit Dial, (HUMALOG) 100 UNIT/ML solution pen-injector Inject  under the skin into the appropriate area as directed 4 (Four) Times a Day. SSI:  151-200 2U  201-250 4U  251-300 6U  301-350 8U  351-400 10U   Yes Benjamin Bermudez MD   linagliptin (TRADJENTA) 5 MG tablet tablet Take 1 tablet by mouth Daily for 30 days. 3/13/23 4/12/23 Yes Marky Funk MD   midodrine (PROAMATINE) 10 MG tablet Take 1 tablet by mouth 3 (Three) Times a Day Before Meals.   Yes Benjamin Bermudez MD   Multiple Vitamins-Minerals (MULTIVITAMIN ADULT) tablet Take 1 mg by mouth Daily.   Yes Benjamin Bermudez MD   pantoprazole (PROTONIX) 40 MG EC tablet Take 1 tablet by mouth Daily. 2/13/23  Yes Benjamin Bermudez MD   tamsulosin (FLOMAX) 0.4 MG capsule 24 hr capsule Take 1 capsule by mouth Daily.   Yes Benjamin Bermudez MD   thiamine (VITAMIN B1) 100 MG tablet Take 1 tablet by mouth Daily. 3/8/23  Yes Britney Joiner DO   Zinc Oxide 25 % ointment Apply 1 application topically Daily As Needed. May mix with antifungal and silver sulfadiazine   Yes Benjamin Bermudez MD   Glucagon (Baqsimi One Pack) 3 MG/DOSE powder 3 mg into the nostril(s) as directed by provider As Needed.    Benjamin Bermudez MD   glucose blood test strip TEST 2 TIMES DAILY AS INSTRUCTED 7/2/18   Benjamin Bermudez MD   Insulin Pen Needle 29G X 12.7MM misc ONE MISCELLANEOUS EVERY EVENING 12/17/14   Benjamin Bermudez MD   Insulin Syringe-Needle U-100 30G X 1/2\" 0.5 ML misc " USES TWICE A DAY AS DIRECTED.  DX: 250.00 12/27/13   Provider, MD Benjamin   midodrine (PROAMATINE) 5 MG tablet Take 1 tablet by mouth 3 (Three) Times a Day Before Meals. 3/7/23 3/22/23  Britney Joiner,        amiodarone, 200 mg, Oral, TID  apixaban, 5 mg, Oral, Q12H  atorvastatin, 40 mg, Oral, Nightly  budesonide, 0.25 mg, Nebulization, BID - RT  [START ON 3/23/2023] bumetanide, 1 mg, Oral, Daily  [START ON 3/23/2023] cefTRIAXone, 1 g, Intravenous, Q24H  [START ON 3/23/2023] clopidogrel, 75 mg, Oral, Daily  [START ON 3/23/2023] folic acid, 1 mg, Oral, Daily  ipratropium-albuterol, 3 mL, Nebulization, 4x Daily - RT  midodrine, 10 mg, Oral, TID AC  [START ON 3/23/2023] pantoprazole, 40 mg, Oral, Daily  povidone-iodine, , Topical, Daily  senna-docusate sodium, 2 tablet, Oral, BID  sodium chloride, 10 mL, Intravenous, Q12H  tamsulosin, 0.4 mg, Oral, Nightly  [START ON 3/23/2023] thiamine, 100 mg, Oral, Daily      dextrose, 75 mL/hr, Last Rate: 75 mL/hr (03/22/23 1331)        Assessment & Plan       1.  Acute kidney injury on CKD 3B  Patient was recently admitted to the hospital for acute kidney injury on chronic kidney disease and was started on hemodialysis for volume management.  Patient admitted with hypoglycemia and evidence of fluid overload.  Patient has been hypotensive.  2.  Chronic hypotension.  Patient is on midodrine.  Fluid removal with dialysis has been difficult due to low blood pressure  3.  Urinary retention.  Diaz cath in place  4.  Anemia of CKD.  Hemoglobin 7.7 this morning  5.  Hypoglycemia.  On hypoglycemia protocol    Plan:  I will order hemodialysis for today  Continue oral midodrine  Discontinue Flomax  IVAN with dialysis.  Follow iron studies  Surveillance labs    I discussed the patients findings and my recommendations with patient, nursing staff and consulting provider    Emanuel Beauchamp MD  03/22/23  14:54 EDT

## 2023-03-22 NOTE — CONSULTS
Inpatient Endocrine Consult  Consultation requested by hospitalist team for hypoglycemia and diabetes mellitus  Patient Care Team:  Arnulfo Monroy MD as PCP - General (Internal Medicine)  Kym Aparicio MD as Consulting Physician (Endocrinology)    Chief Complaint: Hypoglycemia and diabetes mellitus    HPI: This is a 77-year-old male with history of type 2 diabetes, hyperlipidemia, CKD on dialysis frequent admissions to the hospital with hypoglycemia was recently discharged and was seen by our service.  He was on Tradjenta however upon discharge Lantus as well as Humalog sliding scale was added.  He was brought in today with a blood sugar of less than 20 and change in the mental status.  He was given glucose support and currently on D10W.    Past Medical History:   Diagnosis Date   • Alcoholism (Formerly [facility])     hx of   • DM2 (diabetes mellitus, type 2) ([facility])    • Hyperlipidemia    • Hypertension    • PVD (peripheral vascular disease) ([facility])     PTA 2017       Social History     Socioeconomic History   • Marital status:    Tobacco Use   • Smoking status: Some Days     Packs/day: 0.25     Types: Cigarettes   • Smokeless tobacco: Never   Vaping Use   • Vaping Use: Never used   Substance and Sexual Activity   • Alcohol use: No   • Drug use: No       Family History   Problem Relation Age of Onset   • Cancer Mother    • Hypertension Father    • Hypertension Brother        Allergies   Allergen Reactions   • Contrast Dye (Echo Or Unknown Ct/Mr) Itching       ROS:   Constitutional:  Denies fatigue, tiredness.    Eyes:  Denies change in visual acuity   HENT:  Denies nasal congestion or sore throat   Respiratory: Denies cough, shortness of breath.   Cardiovascular:  Denies chest pain, edema   GI:  Denies abdominal pain, nausea, vomiting.   :  Denies polyuria and polydipsia  Musculoskeletal:  Denies back pain or joint pain   Integument:  Denies dry skin, rash   Neurologic:  Denies headache, focal weakness or sensory  changes   Endocrine:  Denies polyuria or polydipsia   Psychiatric:  Denies depression or anxiety      Vitals:    03/22/23 1657   BP: 93/55   Pulse: 72   Resp:    Temp:    SpO2:       Body mass index is 25.94 kg/m².     Physical Exam:  GEN: NAD, conversant  EYES: EOMI, PERRL, no conjunctival erythema  NECK: no thyromegaly, full ROM   CV: RRR, no murmurs/rubs/gallops, no peripheral edema  LUNG: CTAB, no wheezes/rales/rhonchi  SKIN: no rashes, no acanthosis  MSK: no deformities, full ROM of all extremities  NEURO: no tremors, DTR normal  PSYCH: AOX3, appropriate mood, affect normal      Results Review:     I reviewed the patient's new clinical results.    Lab Results   Component Value Date    GLUCOSE 196 (H) 03/22/2023    BUN 33 (H) 03/22/2023    CREATININE 4.21 (H) 03/22/2023    EGFRIFNONA 49 (L) 11/05/2019    BCR 7.8 03/22/2023    K 4.3 03/22/2023    CO2 26.0 03/22/2023    CALCIUM 7.8 (L) 03/22/2023    ALBUMIN 2.3 (L) 03/22/2023    LABIL2 1.3 03/04/2022    AST 38 03/22/2023    ALT 17 03/22/2023       Lab Results   Component Value Date    HGBA1C 7.4 (H) 02/23/2023    HGBA1C 7.6 (H) 02/02/2023    HGBA1C 10.4 (H) 12/13/2022     Lab Results   Component Value Date    CREATININE 4.21 (H) 03/22/2023     Results from last 7 days   Lab Units 03/22/23  1608 03/22/23  1152 03/22/23  1101 03/22/23  1021 03/22/23  0921 03/22/23  0828   GLUCOSE mg/dL 193* 92 73 70 79 92       Medication Review: Reviewed.       Current Facility-Administered Medications:   •  acetaminophen (TYLENOL) tablet 650 mg, 650 mg, Oral, Q4H PRN **OR** acetaminophen (TYLENOL) 160 MG/5ML solution 650 mg, 650 mg, Oral, Q4H PRN **OR** acetaminophen (TYLENOL) suppository 650 mg, 650 mg, Rectal, Q4H PRN, Teresa Menjivar PA-C  •  albumin human 25 % IV SOLN 12.5 g, 12.5 g, Intravenous, PRN, Emanuel Beauchamp MD  •  aluminum-magnesium hydroxide-simethicone (MAALOX MAX) 400-400-40 MG/5ML suspension 15 mL, 15 mL, Oral, Q6H PRN, Teresa Menjivar PA-C  •  amiodarone (PACERONE)  tablet 200 mg, 200 mg, Oral, TID, Teresa Menjivar PA-C, 200 mg at 03/22/23 1657  •  apixaban (ELIQUIS) tablet 5 mg, 5 mg, Oral, Q12H, Teresa Menjivar PA-C  •  atorvastatin (LIPITOR) tablet 40 mg, 40 mg, Oral, Nightly, Teresa Menjivar PA-C  •  sennosides-docusate (PERICOLACE) 8.6-50 MG per tablet 2 tablet, 2 tablet, Oral, BID **AND** polyethylene glycol (MIRALAX) packet 17 g, 17 g, Oral, Daily PRN **AND** bisacodyl (DULCOLAX) EC tablet 5 mg, 5 mg, Oral, Daily PRN **AND** bisacodyl (DULCOLAX) suppository 10 mg, 10 mg, Rectal, Daily PRN, Teresa Menjivar PA-C  •  budesonide (PULMICORT) nebulizer solution 0.25 mg, 0.25 mg, Nebulization, BID - RT, Teresa Menjivar PA-C  •  [START ON 3/23/2023] bumetanide (BUMEX) tablet 1 mg, 1 mg, Oral, Daily, Teresa Menjivar PA-C  •  [START ON 3/23/2023] cefTRIAXone (ROCEPHIN) 1 g in sodium chloride 0.9 % 100 mL IVPB, 1 g, Intravenous, Q24H, Teresa Menjivar PA-C  •  [START ON 3/23/2023] clopidogrel (PLAVIX) tablet 75 mg, 75 mg, Oral, Daily, Teresa Menjivar PA-C  •  dextrose (D50W) (25 g/50 mL) IV injection 50 mL, 50 mL, Intravenous, Q1H PRN, Jed Haider DO, 50 mL at 03/22/23 0610  •  dextrose 10 % infusion, 75 mL/hr, Intravenous, Continuous, Jed Haider DO, Last Rate: 75 mL/hr at 03/22/23 1331, 75 mL/hr at 03/22/23 1331  •  Epoetin Ankush-epbx (RETACRIT) injection 20,000 Units, 20,000 Units, Intravenous, Once, Emanuel Beauchamp MD  •  [START ON 3/23/2023] folic acid (FOLVITE) tablet 1 mg, 1 mg, Oral, Daily, Teresa Menjivar PA-C  •  ipratropium-albuterol (DUO-NEB) nebulizer solution 3 mL, 3 mL, Nebulization, 4x Daily - RT, Teresa Menjivar PA-C, 3 mL at 03/22/23 1130  •  melatonin tablet 5 mg, 5 mg, Oral, Nightly PRN, Teresa Menjivar PA-C  •  midodrine (PROAMATINE) tablet 10 mg, 10 mg, Oral, TID AC, Segun Rodrigues MD, 10 mg at 03/22/23 1657  •  ondansetron (ZOFRAN) tablet 4 mg, 4 mg, Oral, Q6H PRN **OR** ondansetron (ZOFRAN) injection 4 mg, 4 mg, Intravenous, Q6H PRN,  Teresa Menjivar PA-C  •  [START ON 3/23/2023] pantoprazole (PROTONIX) EC tablet 40 mg, 40 mg, Oral, Daily, Teresa Menjivar PA-C  •  povidone-iodine (BETADINE) external solution, , Topical, Daily, Segun Rodrigues MD  •  [COMPLETED] Insert Peripheral IV, , , Once **AND** sodium chloride 0.9 % flush 10 mL, 10 mL, Intravenous, PRN, Jed Haider,   •  sodium chloride 0.9 % flush 10 mL, 10 mL, Intravenous, Q12H, Teresa Menjivar PA-C  •  sodium chloride 0.9 % flush 10 mL, 10 mL, Intravenous, PRN, Teresa Menjivar PA-C  •  sodium chloride 0.9 % infusion 40 mL, 40 mL, Intravenous, PRN, Teresa Menjivar PA-C  •  [START ON 3/23/2023] thiamine (VITAMIN B-1) tablet 100 mg, 100 mg, Oral, Daily, Teresa Menjivar PA-C          Assessment and plan:  Hypoglycemia: Due to insulin, currently on D10W.  We will follow blood sugars and taper off D10W as appropriate.    Diabetes mellitus type 2: Uncontrolled with several episodes of hypoglycemia.  This is a 77-year-old male with history of type 2 diabetes and chronic kidney disease very sensitive to insulin.  We have to be very careful and use of insulin in this gentleman.  At this time he is on D10 W, I will recommend to check blood sugars every hour.  Once his blood sugars are more than 180x2  then we can slowly taper off D10W.  And if he stays persistently hyperglycemic then we can consider adding Tradjenta.  Recommend to avoid sliding scale as well as any other insulin at this time.    CKD: On hemodialysis.    Thank you very much for the consultation.      Mekhi Vidal MD FACE.

## 2023-03-23 NOTE — NURSING NOTE
Dr. Valdez at bedside to evaluate. Spoke with patient regarding poor prognosis. Dr. Valdez called son to discuss goals of care and update on status/prognosis.

## 2023-03-23 NOTE — PROGRESS NOTES
Daily Progress Note    Patient Care Team:  Arnulfo Monroy MD as PCP - General (Internal Medicine)  Kym Aparicio MD as Consulting Physician (Endocrinology)    Chief Complaint: Follow-up type 2 diabetes/hypoglycemia    HPI: Patient seen, chart reviewed.  Blood sugars are now stable.  Eating well.  Currently not on any antidiabetic agents.    ROS:   Constitutional:  Denies fatigue, tiredness.    Respiratory: denies cough, shortness of breath.   Cardiovascular:  denies chest pain, edema   GI:  Denies abdominal pain, nausea, vomiting.         Vitals:    03/23/23 1328   BP:    Pulse: 86   Resp:    Temp:    SpO2: 90%     Body mass index is 25.37 kg/m².    Physical Exam:  GEN: NAD, conversant  PSYCH: Awake and coherent.      Results Review:     I reviewed the patient's new clinical results.    Glucose   Date Value Ref Range Status   03/23/2023 210 (H) 65 - 99 mg/dL Final     Sodium   Date Value Ref Range Status   03/23/2023 131 (L) 136 - 145 mmol/L Final     Potassium   Date Value Ref Range Status   03/23/2023 4.7 3.5 - 5.2 mmol/L Final     CO2   Date Value Ref Range Status   03/23/2023 23.0 22.0 - 29.0 mmol/L Final     Chloride   Date Value Ref Range Status   03/23/2023 95 (L) 98 - 107 mmol/L Final     Anion Gap   Date Value Ref Range Status   03/23/2023 13.0 5.0 - 15.0 mmol/L Final     Creatinine   Date Value Ref Range Status   03/23/2023 4.73 (H) 0.76 - 1.27 mg/dL Final     BUN   Date Value Ref Range Status   03/23/2023 34 (H) 8 - 23 mg/dL Final     BUN/Creatinine Ratio   Date Value Ref Range Status   03/23/2023 7.2 7.0 - 25.0 Final     Calcium   Date Value Ref Range Status   03/23/2023 7.9 (L) 8.6 - 10.5 mg/dL Final     Alkaline Phosphatase   Date Value Ref Range Status   03/23/2023 225 (H) 39 - 117 U/L Final     Total Protein   Date Value Ref Range Status   03/23/2023 6.2 6.0 - 8.5 g/dL Final     ALT (SGPT)   Date Value Ref Range Status   03/23/2023 30 1 - 41 U/L Final     AST (SGOT)   Date Value Ref  Range Status   03/23/2023 67 (H) 1 - 40 U/L Final     Total Bilirubin   Date Value Ref Range Status   03/23/2023 0.2 0.0 - 1.2 mg/dL Final     Albumin   Date Value Ref Range Status   03/23/2023 2.3 (L) 3.5 - 5.2 g/dL Final     Globulin   Date Value Ref Range Status   03/23/2023 3.9 gm/dL Final     Magnesium   Date Value Ref Range Status   03/23/2023 1.9 1.6 - 2.4 mg/dL Final     Phosphorus   Date Value Ref Range Status   03/23/2023 5.6 (H) 2.5 - 4.5 mg/dL Final     Lab Results   Component Value Date    HGBA1C 7.4 (H) 02/23/2023    HGBA1C 7.6 (H) 02/02/2023    HGBA1C 10.4 (H) 12/13/2022     No results found for: GLUF, MICROALBUR  Results from last 7 days   Lab Units 03/23/23  1305 03/23/23  0800 03/23/23  0509 03/23/23  0322 03/23/23  0056 03/22/23  2356   GLUCOSE mg/dL 113* 149* 162* 171* 208* 223*             Medication Review: Reviewed.     amiodarone, 200 mg, Oral, TID  apixaban, 5 mg, Oral, Q12H  atorvastatin, 40 mg, Oral, Nightly  budesonide, 0.5 mg, Nebulization, BID - RT  bumetanide, 1 mg, Oral, Daily  cefTRIAXone, 1 g, Intravenous, Q24H  clopidogrel, 75 mg, Oral, Daily  epoetin ney/ney-epbx, 20,000 Units, Intravenous, Once  folic acid, 1 mg, Oral, Daily  ipratropium-albuterol, 3 mL, Nebulization, 4x Daily - RT  midodrine, 10 mg, Oral, TID AC  pantoprazole, 40 mg, Oral, Daily  povidone-iodine, , Topical, Daily  senna-docusate sodium, 2 tablet, Oral, BID  sodium chloride, 10 mL, Intravenous, Q12H  thiamine, 100 mg, Oral, Daily              Assessment and plan:  Hypoglycemia: Better now off D10W..     Diabetes mellitus type 2: Uncontrolled with several episodes of hypoglycemia.  This is a 77-year-old male with history of type 2 diabetes and chronic kidney disease very sensitive to insulin.  We have to be very careful and use of insulin in this gentleman.    Is off D10W, blood sugars fair, will watch it for now.  If needed will add Tradjenta.  We have to be very careful in the use of insulin in this  gentleman.     CKD: On hemodialysis.    Mekhi Vidal MD. FACE

## 2023-03-23 NOTE — CONSULTS
Critical care consult   Holger Ibrahim : 1945 MRN:[id number] LOS:1     Reason for admission: Hypoglycemia     Assessment / Plan     Acute respiratory failure with hypoxia / Bilateral pleural effusions:   · Likely due to CHF and pleural effusions.   · BiPAP settings noted and adjusted as needed. CxR with bilateral effusions, Doubt pneumonia    Acute on chronic Systolic heart failure:   • Currently decompensated.   • Last ECHO from 2019 showed an EF of 65%. Bedside ECHO on 3/23 showed EF of 30% with severe aortic stenosis. Follow official echo results.   • Unable to give betablockers or ACEi due to shock state.  Continue with Bumex for now.  On dialysis for volume management.  • Monitor Input/Output very closely. Follow daily weights.   Net IO Since Admission: -1,380 mL [23 1702]    Cardiogenic shock:  · On dobutamine for a MAP target of 60-65. No further escalation of pressors to norepinephrine, per daughter in law    Severe aortic stenosis  · Recently seen at [facility] in [place], underwent a cardiac cath and was deemed too frail to undergo a TAVR procedure at that time.  · Poor long-term prognosis.    Acute cystitis  · Urine cultures growing gram negative rods, on ceftriaxone.  · Unclear if patient even have symptoms.     Diabetes mellitus Type 2, not insulin-dependent : well controlled.   • Hypoglycemia resolved. Endocrine following.  Off D10 drip.  DC diabetic diet.  • Accu checks ACHS and c/w humalog coverage as needed.   • Last A1c of 7.4    Chronic atrial fibrillation :   • Not on any rate control agents  • CHADS-VASc score of 5. Currently on anticoagulation with eliquis    Peripheral vascular disease, h/o right popliteal artery stent: On plavix  Anemia of renal disease: On Aranesp and iron supplements.  Chronic Urinary retention: Diaz in place  COPD: Chronic and stable, bronchodilators as needed.  History of lung nodule and right lower lobe: Outpatient follow-up.  Bilateral heel  wounds    Goals of care: Had a lengthy discussion with patient at the bedside.  Discussed about his multiorgan failure as well as end-stage heart failure.  Discussed about continuing aggressive care but involves some additional procedures as well as additional struggle with the hope of living little longer.  Also discussed about palliative route which involves less struggle and less pain at the cost of living less.  He understands that his heart is very weak and does not have many good options.  Opted for palliative route.  Did tell him that palliative route involves stopping dialysis and any aggressive measures and purely focus on comfort, he accepts it and understands that he will be dying soon.  RN present at the time of my conversation.    Subsequently, also spoke with son and daughter-in-law separately over the phone.  Discussed about my conversation with the patient.  Informed them that patient might not have a total understanding but did opt for palliative route.  Both son and daughter-in-law leaning towards hospice.  Wants to continue with current measures with no further escalation.  Discussed about potential need for Levophed and the need for central line.  However, daughter-in-law does not want any additional procedures and okay with not escalating vasopressors.  They will discuss tonight and get back to us with further direction.  Wants to keep him DNR/DNI for the time being.      Medical Intervention Limits: NO intubation (DNI); NO cardioversion  Code Status (Patient has no pulse and is not breathing): No CPR (Do Not Attempt to Resuscitate)  Medical Interventions (Patient has pulse or is breathing): Limited Support       Nutrition: Diet: Cardiac Diets; Healthy Heart (2-3 Na+); Texture: Mechanical Ground (NDD 2); Fluid Consistency: Thin (IDDSI 0)     DVT Prophylaxis:   Mechanical Order History:     None      Pharmalogical Order History:      Ordered     Dose Route Frequency Stop    03/23/23 9581  heparin  (porcine) injection 5,000 Units         5,000 Units IK As Needed --    03/22/23 1314  apixaban (ELIQUIS) tablet 5 mg         5 mg PO Every 12 Hours Scheduled --                 History of Present illness     A 77 y.o. old male patient with PMH of multiple comorbidities including CHF, aortic stenosis, ESRD on hemodialysis, diabetes, presents to the hospital with altered mental status.  Found to have blood glucose of 20 and improved with dextrose.  Apparently, patient had recurrent admissions with hypoglycemia.    Also found to have UTI, treated with ceftriaxone.  Chest x-ray with bibasilar infiltrates.  Nephrology was consulted for dialysis.  Subsequently, patient became hypotensive and needed escalating doses of dopamine.  Also became progressively hypoxic, transferred to ICU.    Currently on BiPAP, history limited due to being on BiPAP.    Subjective / Review of systems     Review of Systems   History limited by being on BiPAP.  Past Medical/Surgical/Social/Family History & Allergies     Past Medical History:   Diagnosis Date   • Alcoholism (HCC)     hx of   • DM2 (diabetes mellitus, type 2) ([facility])    • Hyperlipidemia    • Hypertension    • PVD (peripheral vascular disease) ([facility])     PTA 2017      Past Surgical History:   Procedure Laterality Date   • APPENDECTOMY     • CARDIAC CATHETERIZATION N/A 11/5/2019    Procedure: PERIPHERAL ANGIOGRAPHY, Rt popliteal PTA;  Surgeon: Jonny Ferguson MD;  Location: [facility] INVASIVE LOCATION;  Service: Cardiovascular   • EYE SURGERY Right    • HERNIA REPAIR     • LEG SURGERY  01/2020   • POPLITEAL ARTERY ANGIOPLASTY Right 2017   • SPLENECTOMY      partial spleen removal      Social History     Socioeconomic History   • Marital status:    Tobacco Use   • Smoking status: Some Days     Packs/day: 0.25     Types: Cigarettes   • Smokeless tobacco: Never   Vaping Use   • Vaping Use: Never used   Substance and Sexual Activity   • Alcohol use: No   • Drug use: No       Family History   Problem Relation Age of Onset   • Cancer Mother    • Hypertension Father    • Hypertension Brother       Allergies   Allergen Reactions   • Contrast Dye (Echo Or Unknown Ct/Mr) Itching        Home Medications     Prior to Admission medications    Medication Sig Start Date End Date Taking? Authorizing Provider   amiodarone (PACERONE) 200 MG tablet Take 1 tablet by mouth 3 (Three) Times a Day. 2/13/23  Yes Benjamin Bermudez MD   apixaban (ELIQUIS) 5 MG tablet tablet Take 1 tablet by mouth Every 12 (Twelve) Hours. 2/13/23  Yes Benjamin Bermudez MD   atorvastatin (LIPITOR) 40 MG tablet Take 1 tablet by mouth Every Night. 3/8/17  Yes Benjamin Bermudez MD   bumetanide (BUMEX) 1 MG tablet Take 1 tablet by mouth Daily. 3/8/23  Yes Britney Joiner DO   clopidogrel (PLAVIX) 75 MG tablet Take 1 tablet by mouth Daily. 4/13/17  Yes Benjamin Bermudez MD   folic acid (FOLVITE) 1 MG tablet Take 1 tablet by mouth Daily. 3/8/23  Yes Britney Joiner DO   insulin glargine (LANTUS, SEMGLEE) 100 UNIT/ML injection Inject 5 Units under the skin into the appropriate area as directed Daily.   Yes Benjamin Bermudez MD   Insulin Lispro, 1 Unit Dial, (HUMALOG) 100 UNIT/ML solution pen-injector Inject  under the skin into the appropriate area as directed 4 (Four) Times a Day. SSI:  151-200 2U  201-250 4U  251-300 6U  301-350 8U  351-400 10U   Yes Benjamin Bermudez MD   linagliptin (TRADJENTA) 5 MG tablet tablet Take 1 tablet by mouth Daily for 30 days. 3/13/23 4/12/23 Yes Marky Funk MD   midodrine (PROAMATINE) 10 MG tablet Take 1 tablet by mouth 3 (Three) Times a Day Before Meals.   Yes Benjamin Bermudez MD   Multiple Vitamins-Minerals (MULTIVITAMIN ADULT) tablet Take 1 mg by mouth Daily.   Yes Benjamin Bermudez MD   pantoprazole (PROTONIX) 40 MG EC tablet Take 1 tablet by mouth Daily. 2/13/23  Yes Benjamin Bermudez MD   tamsulosin (FLOMAX) 0.4 MG capsule 24 hr capsule Take 1 capsule  "by mouth Daily.   Yes Benjamin Bermudez MD   thiamine (VITAMIN B1) 100 MG tablet Take 1 tablet by mouth Daily. 3/8/23  Yes Britney Joiner,    Zinc Oxide 25 % ointment Apply 1 application topically Daily As Needed. May mix with antifungal and silver sulfadiazine   Yes Benjamin Bermudez MD   Glucagon (Baqsimi One Pack) 3 MG/DOSE powder 3 mg into the nostril(s) as directed by provider As Needed.    Benjamin Berumdez MD   glucose blood test strip TEST 2 TIMES DAILY AS INSTRUCTED 7/2/18   Benjamin Bermudez MD   Insulin Pen Needle 29G X 12.7MM misc ONE MISCELLANEOUS EVERY EVENING 12/17/14   Benjamin Bermudez MD   Insulin Syringe-Needle U-100 30G X 1/2\" 0.5 ML misc USES TWICE A DAY AS DIRECTED.  DX: 250.00 12/27/13   Benjamin Bermudez MD      Objective / Physical Exam   Vital signs:  Temp: 98.1 °F (36.7 °C)  BP: (!) 76/48  Heart Rate: 86  Resp: 17  SpO2: 90 %  Weight: 76.5 kg (168 lb 10.4 oz)    Admission Weight: Weight: 82 kg (180 lb 12.4 oz)    Physical Exam  Vitals and nursing note reviewed.   Constitutional:       General: He is not in acute distress.     Appearance: Normal appearance.   HENT:      Head: Normocephalic and atraumatic.      Nose: No congestion or rhinorrhea.      Mouth/Throat:      Mouth: Mucous membranes are moist.      Pharynx: No posterior oropharyngeal erythema.   Eyes:      General: No scleral icterus.     Extraocular Movements: Extraocular movements intact.      Conjunctiva/sclera: Conjunctivae normal.   Cardiovascular:      Rate and Rhythm: Normal rate.      Heart sounds: Normal heart sounds. No murmur heard.    No gallop.      Comments: Flow murmur present over the aortic area  Pulmonary:      Breath sounds: Rales (Bibasilar) present. No wheezing.      Comments: On BiPAP  Abdominal:      General: There is no distension.      Palpations: Abdomen is soft.      Tenderness: There is no abdominal tenderness.   Musculoskeletal:         General: No tenderness.      Cervical " back: Normal range of motion. No rigidity.      Right lower leg: Edema (++) present.      Left lower leg: Edema (++) present.      Comments: Bilateral heel ulcers with dressing in place   Skin:     General: Skin is warm and dry.   Neurological:      General: No focal deficit present.      Mental Status: He is alert and oriented to person, place, and time.   Psychiatric:         Mood and Affect: Mood normal.         Thought Content: Thought content normal.        Labs     Results from last 7 days   Lab Units 03/23/23  0012 03/22/23  0618   WBC 10*3/mm3 8.20 6.10   HEMATOCRIT % 24.5* 24.0*   PLATELETS 10*3/mm3 171 168      Results from last 7 days   Lab Units 03/23/23  0012 03/22/23  0618   SODIUM mmol/L 131* 134*   POTASSIUM mmol/L 4.7 4.3   CHLORIDE mmol/L 95* 98   CO2 mmol/L 23.0 26.0   BUN mg/dL 34* 33*   CREATININE mg/dL 4.73* 4.21*        Imaging     Chest X ray: My independent assessment showed large bilateral pleural effusions.    EKG: My independent evaluation showed normal sinus rhythm, right bundle branch block.    Current Medications   Scheduled Meds:amiodarone, 200 mg, Oral, TID  apixaban, 5 mg, Oral, Q12H  atorvastatin, 40 mg, Oral, Nightly  bumetanide, 1 mg, Oral, Daily  cefTRIAXone, 1 g, Intravenous, Q24H  clopidogrel, 75 mg, Oral, Daily  epoetin ney/ney-epbx, 20,000 Units, Intravenous, Once  folic acid, 1 mg, Oral, Daily  midodrine, 10 mg, Oral, TID AC  pantoprazole, 40 mg, Oral, Daily  povidone-iodine, , Topical, Daily  sodium chloride, 10 mL, Intravenous, Q12H  thiamine, 100 mg, Oral, Daily         Continuous Infusions:DOBUTamine, 2-20 mcg/kg/min, Last Rate: 6 mcg/kg/min (03/23/23 1610)       Patient continues to be critically ill, needed high complexity decision making and remains at high risk of clinical deterioration or death. I have spent a total of 45 minutes providing critical care services to this patient including but not limited to : review of labs/ microbiology/imaging/medications,  serial monitoring of vital signs, adjusting BiPAP settings as needed, review of other consultant's notes, monitoring input/output, review of treatment plan with bedside nurse and other treatment team, management of life support and nutrition needs.  Also spoke with nephrology and discussed the plan of care.    Time spent in performing separately billable procedures and updating family is not included in the critical care time.    Dante Valdez MD  Critical Care  03/23/23   17:02 EDT

## 2023-03-23 NOTE — CONSULTS
Palliative Care Social Work Progress Note    Code Status:do not resuscitate    Goals of Care: Limited Additonal Intervention     Narrative: Palliative care  met with pt's daughter-in-law to assess for goals of care while pt at dialysis.  Daughter-in-law, Manasa, is pt's healthcare surrogate per documentation on file.  Manasa reports that she wants to assist with clarifying pt's goals.  Manasa does not want the pt to suffer, and feels he has already endured much suffering.  We visited with pt at dialysis to discuss his goals of care.  Hospice discussed at length.  Pt is aware of his condition and acknowledges his feelings about continuing with aggressive care.  Pt reported to Manasa that he didn't want to keep living this way.  However, pt also reports that he wants to keep pursuing aggressive medical treatment, and does not wish to discontinue dialysis.  Code status to remain DNR/DNI.  No other needs expressed by pt.    Manasa debriefed with this writer after contact.  We discussed referral to an outpatient palliative care team, and was provided resources.  Manasa reports she will follow up if referral is desired.  Emotional support provided.      Plan:  -Will continue to follow.            DORIE Armstrong

## 2023-03-23 NOTE — PROGRESS NOTES
[facility] Medicine Services Daily Progress Note    Patient Name: Holger Ibrahim  : 1945  MRN: [id number]  Primary Care Physician:  Arnulfo Monroy MD  Date of admission: 3/22/2023      Subjective      Chief Complaint: Recurrent hypoglycemia diffuse edema      Patient Reports his not feeling well today.  Requiring dopamine.  Requiring BiPAP.  Dialyzed today.  Bladder with over 200 cc.  Irrigation requested.  Better output after irrigated catheter.  Pulmonary consulted    ROS negative except as above      Objective      Vitals:   Temp:  [97.4 °F (36.3 °C)-97.9 °F (36.6 °C)] 97.8 °F (36.6 °C)  Heart Rate:  [65-86] 86  Resp:  [14-18] 17  BP: ()/() 76/48  Flow (L/min):  [4-15] 15    Physical Exam  Vitals reviewed.   Constitutional:       Comments: Awake alert wearing BiPAP.  Diaz catheter in place  Nurse at bedside  Diffuse anasarca noted   HENT:      Head: Normocephalic.   Cardiovascular:      Rate and Rhythm: Normal rate.   Pulmonary:      Effort: Pulmonary effort is normal.      Breath sounds: Rhonchi and rales present.   Abdominal:      General: Abdomen is flat.      Palpations: Abdomen is soft.   Musculoskeletal:         General: Normal range of motion.      Cervical back: Normal range of motion.      Right lower leg: Edema present.      Left lower leg: Edema present.   Skin:     General: Skin is warm.   Neurological:      General: No focal deficit present.      Mental Status: He is alert and oriented to person, place, and time.   Psychiatric:         Mood and Affect: Mood normal.         Behavior: Behavior normal.             Result Review    Result Review:  I have personally reviewed the results from the time of this admission to 3/23/2023 14:40 EDT and agree with these findings:  [x]  Laboratory  []  Microbiology  []  Radiology  []  EKG/Telemetry   []  Cardiology/Vascular   []  Pathology  []  Old records  []  Other:  Most notable findings include: BUN 34 sodium  131    Wounds (last 24 hours)     LDA Wound     Row Name 03/23/23 0800 03/23/23 0400 03/23/23 0030       Wound 03/22/23 1203 Bilateral posterior scrotum Abrasion    Wound - Properties Group Placement Date: 03/22/23  -TL Placement Time: 1203  -TL Side: Bilateral  -TL Orientation: posterior  -TL Location: scrotum  -TL Primary Wound Type: Abrasion  -TL Additional Comments: reddness and swelling to scrotum area small area opened  -TL    Pressure Injury Stage U  -VA U  -SM --    Dressing Care open to air  -VA open to air  -SM --    Retired Wound - Properties Group Placement Date: 03/22/23  -TL Placement Time: 1203  -TL Side: Bilateral  -TL Orientation: posterior  -TL Location: scrotum  -TL Primary Wound Type: Abrasion  -TL Additional Comments: reddness and swelling to scrotum area small area opened  -TL    Retired Wound - Properties Group Date first assessed: 03/22/23  -TL Time first assessed: 1203  -TL Side: Bilateral  -TL Location: scrotum  -TL Primary Wound Type: Abrasion  -TL Additional Comments: reddness and swelling to scrotum area small area opened  -TL       Wound 02/23/23 1437 medial coccyx Pressure Injury    Wound - Properties Group Placement Date: 02/23/23  -SH Placement Time: 1437  -SH Present on Hospital Admission: Y  -SH Orientation: medial  -SH Location: coccyx  -SH Primary Wound Type: Pressure inj  -SH Additional Comments: stage 2  -SH    Pressure Injury Stage 2  -VA 2  -SM 2  -SM    Periwound pink  -VA pink  -SM pink  -SM    Care, Wound cleansed with  -VA -- --    Dressing Care -- silicone  -SM silicone  -SM    Retired Wound - Properties Group Placement Date: 02/23/23  -SH Placement Time: 1437  -SH Present on Hospital Admission: Y  -SH Orientation: medial  -SH Location: coccyx  -SH Primary Wound Type: Pressure inj  -SH Additional Comments: stage 2  -SH    Retired Wound - Properties Group Date first assessed: 02/23/23  -SH Time first assessed: 1437  -SH Present on Hospital Admission: Y  -SH Location:  coccyx  -SH Primary Wound Type: Pressure inj  -SH Additional Comments: stage 2  -SH       Wound 02/23/23 1715 Left posterior heel    Wound - Properties Group Placement Date: 02/23/23  -BS Placement Time: 1715 -BS Side: Left  -BS Orientation: posterior  -BS Location: heel  -BS    Pressure Injury Stage U  -VA U  -SM U  -SM    Care, Wound -- --  rosendo boot  -SM --    Dressing Care -- -- --  rosendo boot on  -SM    Periwound Care -- other (see comments)  -SM --    Retired Wound - Properties Group Placement Date: 02/23/23  -BS Placement Time: 1715  -BS Side: Left  -BS Orientation: posterior  -BS Location: heel  -BS    Retired Wound - Properties Group Date first assessed: 02/23/23  -BS Time first assessed: 1715  -BS Side: Left  -BS Location: heel  -BS       Wound 02/23/23 1712 Right posterior heel Pressure Injury    Wound - Properties Group Placement Date: 02/23/23  -BS Placement Time: 1712 -BS Present on Hospital Admission: Y  -BS Side: Right  -BS Orientation: posterior  -BS Location: heel  -BS Primary Wound Type: Pressure inj  -BS    Pressure Injury Stage U  -VA U  -SM --    Base dry  -VA dry  -SM dry  -SM    Dressing Care -- --  rosendo boot  -SM --  rosendo boot on  -SM    Retired Wound - Properties Group Placement Date: 02/23/23  -BS Placement Time: 1712  -BS Present on Hospital Admission: Y  -BS Side: Right  -BS Orientation: posterior  -BS Location: heel  -BS Primary Wound Type: Pressure inj  -BS    Retired Wound - Properties Group Date first assessed: 02/23/23  -BS Time first assessed: 1712  -BS Present on Hospital Admission: Y  -BS Side: Right  -BS Location: heel  -BS Primary Wound Type: Pressure inj  -BS       Wound 03/22/23 1518 Right posterior foot    Wound - Properties Group Placement Date: 03/22/23  -DM Placement Time: 1518  -DM Side: Right  -DM Orientation: posterior  -DM Location: foot  -DM    Pressure Injury Stage U  -VA U  -SM U  -SM    Base scab  -VA scab  -SM scab  -SM    Care, Wound -- rosendo boot  -SM --    Retired  Wound - Properties Group Placement Date: 03/22/23  -DM Placement Time: 1518  -DM Side: Right  -DM Orientation: posterior  -DM Location: foot  -DM    Retired Wound - Properties Group Date first assessed: 03/22/23  -DM Time first assessed: 1518  -DM Side: Right  -DM Location: foot  -DM       Wound 03/22/23 1526 Left gluteal    Wound - Properties Group Placement Date: 03/22/23  -DM Placement Time: 1526  -DM Side: Left  -DM Location: gluteal  -DM    Pressure Injury Stage -- 1  -SM 1  -SM    Base pink;blanchable  -VA pink;blanchable  -SM pink;blanchable  -SM    Drainage Amount none  -VA none  -SM none  -SM    Dressing Care -- silicone  -SM --    Retired Wound - Properties Group Placement Date: 03/22/23  -DM Placement Time: 1526  -DM Side: Left  -DM Location: gluteal  -DM    Retired Wound - Properties Group Date first assessed: 03/22/23  -DM Time first assessed: 1526  -DM Side: Left  -DM Location: gluteal  -DM    Row Name 03/22/23 2000 03/22/23 1600 03/22/23 1526       Wound 03/22/23 1203 Bilateral posterior scrotum Abrasion    Wound - Properties Group Placement Date: 03/22/23  -TL Placement Time: 1203  -TL Side: Bilateral  -TL Orientation: posterior  -TL Location: scrotum  -TL Primary Wound Type: Abrasion  -TL Additional Comments: reddness and swelling to scrotum area small area opened  -TL    Retired Wound - Properties Group Placement Date: 03/22/23  -TL Placement Time: 1203  -TL Side: Bilateral  -TL Orientation: posterior  -TL Location: scrotum  -TL Primary Wound Type: Abrasion  -TL Additional Comments: reddness and swelling to scrotum area small area opened  -TL    Retired Wound - Properties Group Date first assessed: 03/22/23  -TL Time first assessed: 1203  -TL Side: Bilateral  -TL Location: scrotum  -TL Primary Wound Type: Abrasion  -TL Additional Comments: reddness and swelling to scrotum area small area opened  -TL       Wound 02/23/23 1437 medial coccyx Pressure Injury    Wound - Properties Group Placement Date:  02/23/23  - Placement Time: 1437  -SH Present on Hospital Admission: Y  -SH Orientation: medial  -SH Location: coccyx  -SH Primary Wound Type: Pressure inj  -SH Additional Comments: stage 2  -SH    Pressure Injury Stage 2  -SM 2  -JM --    Periwound pink  -SM pink  -JM --    Dressing Care silicone  -SM dressing applied  -JM --    Retired Wound - Properties Group Placement Date: 02/23/23  -SH Placement Time: 1437  -SH Present on Hospital Admission: Y  -SH Orientation: medial  -SH Location: coccyx  -SH Primary Wound Type: Pressure inj  -SH Additional Comments: stage 2  -SH    Retired Wound - Properties Group Date first assessed: 02/23/23  -SH Time first assessed: 1437  -SH Present on Hospital Admission: Y  -SH Location: coccyx  -SH Primary Wound Type: Pressure inj  -SH Additional Comments: stage 2  -SH       Wound 02/23/23 1715 Left posterior heel    Wound - Properties Group Placement Date: 02/23/23  -BS Placement Time: 1715  -BS Side: Left  -BS Orientation: posterior  -BS Location: heel  -BS    Pressure Injury Stage U  -SM U  -JM --    Dressing Care --  dressing in place rosendo boots on  -SM -- --    Retired Wound - Properties Group Placement Date: 02/23/23  -BS Placement Time: 1715  -BS Side: Left  -BS Orientation: posterior  -BS Location: heel  -BS    Retired Wound - Properties Group Date first assessed: 02/23/23  -BS Time first assessed: 1715  -BS Side: Left  -BS Location: heel  -BS       Wound 02/23/23 1712 Right posterior heel Pressure Injury    Wound - Properties Group Placement Date: 02/23/23  -BS Placement Time: 1712  -BS Present on Hospital Admission: Y  -BS Side: Right  -BS Orientation: posterior  -BS Location: heel  -BS Primary Wound Type: Pressure inj  -BS    Pressure Injury Stage O  -SM O  flacky skin, some abrasion  -JM --    Base dry  -SM dry  -JM --    Dressing Care --  intact  rosendo boot on  -SM -- --    Retired Wound - Properties Group Placement Date: 02/23/23  -BS Placement Time: 1712  -BS Present on  Hospital Admission: Y  -BS Side: Right  -BS Orientation: posterior  -BS Location: heel  -BS Primary Wound Type: Pressure inj  -BS    Retired Wound - Properties Group Date first assessed: 02/23/23  -BS Time first assessed: 1712  -BS Present on Hospital Admission: Y  -BS Side: Right  -BS Location: heel  -BS Primary Wound Type: Pressure inj  -BS       Wound 03/22/23 1518 Right posterior foot    Wound - Properties Group Placement Date: 03/22/23  -DM Placement Time: 1518  -DM Side: Right  -DM Orientation: posterior  -DM Location: foot  -DM    Pressure Injury Stage U  -SM U  -JM --    Base scab  -SM scab  -JM --    Retired Wound - Properties Group Placement Date: 03/22/23  -DM Placement Time: 1518  -DM Side: Right  -DM Orientation: posterior  -DM Location: foot  -DM    Retired Wound - Properties Group Date first assessed: 03/22/23  -DM Time first assessed: 1518  -DM Side: Right  -DM Location: foot  -DM       Wound 03/22/23 1526 Left gluteal    Wound - Properties Group Placement Date: 03/22/23  -DM Placement Time: 1526  -DM Side: Left  -DM Location: gluteal  -DM    Wound Image -- -- Images linked: 1  -DM    Pressure Injury Stage 1  -SM 1  -JM --    Base pink;blanchable  -SM pink;blanchable  -JM --    Drainage Amount none  -SM -- --    Retired Wound - Properties Group Placement Date: 03/22/23  -DM Placement Time: 1526  -DM Side: Left  -DM Location: gluteal  -DM    Retired Wound - Properties Group Date first assessed: 03/22/23  -DM Time first assessed: 1526  -DM Side: Left  -DM Location: gluteal  -DM    Row Name 03/22/23 1525 03/22/23 1519 03/22/23 1518       Wound 03/22/23 1203 Bilateral posterior scrotum Abrasion    Wound - Properties Group Placement Date: 03/22/23  -TL Placement Time: 1203  -TL Side: Bilateral  -TL Orientation: posterior  -TL Location: scrotum  -TL Primary Wound Type: Abrasion  -TL Additional Comments: reddness and swelling to scrotum area small area opened  -TL    Retired Wound - Properties Group  Placement Date: 03/22/23  -TL Placement Time: 1203  -TL Side: Bilateral  -TL Orientation: posterior  -TL Location: scrotum  -TL Primary Wound Type: Abrasion  -TL Additional Comments: reddness and swelling to scrotum area small area opened  -TL    Retired Wound - Properties Group Date first assessed: 03/22/23  -TL Time first assessed: 1203  -TL Side: Bilateral  -TL Location: scrotum  -TL Primary Wound Type: Abrasion  -TL Additional Comments: reddness and swelling to scrotum area small area opened  -TL       Wound 02/23/23 1437 medial coccyx Pressure Injury    Wound - Properties Group Placement Date: 02/23/23  -SH Placement Time: 1437  -SH Present on Hospital Admission: Y  -SH Orientation: medial  -SH Location: coccyx  -SH Primary Wound Type: Pressure inj  -SH Additional Comments: stage 2  -SH    Wound Image Images linked: 1  -DM -- --    Retired Wound - Properties Group Placement Date: 02/23/23  -SH Placement Time: 1437  -SH Present on Hospital Admission: Y  -SH Orientation: medial  -SH Location: coccyx  -SH Primary Wound Type: Pressure inj  -SH Additional Comments: stage 2  -SH    Retired Wound - Properties Group Date first assessed: 02/23/23  -SH Time first assessed: 1437  -SH Present on Hospital Admission: Y  -SH Location: coccyx  -SH Primary Wound Type: Pressure inj  -SH Additional Comments: stage 2  -SH       Wound 02/23/23 1715 Left posterior heel    Wound - Properties Group Placement Date: 02/23/23  -BS Placement Time: 1715  -BS Side: Left  -BS Orientation: posterior  -BS Location: heel  -BS    Wound Image -- Images linked: 1  -DM --    Retired Wound - Properties Group Placement Date: 02/23/23  -BS Placement Time: 1715  -BS Side: Left  -BS Orientation: posterior  -BS Location: heel  -BS    Retired Wound - Properties Group Date first assessed: 02/23/23  -BS Time first assessed: 1715  -BS Side: Left  -BS Location: heel  -BS       Wound 02/23/23 1712 Right posterior heel Pressure Injury    Wound - Properties  Group Placement Date: 02/23/23  -BS Placement Time: 1712  -BS Present on Hospital Admission: Y  -BS Side: Right  -BS Orientation: posterior  -BS Location: heel  -BS Primary Wound Type: Pressure inj  -BS    Retired Wound - Properties Group Placement Date: 02/23/23  -BS Placement Time: 1712 -BS Present on Hospital Admission: Y  -BS Side: Right  -BS Orientation: posterior  -BS Location: heel  -BS Primary Wound Type: Pressure inj  -BS    Retired Wound - Properties Group Date first assessed: 02/23/23  -BS Time first assessed: 1712  -BS Present on Hospital Admission: Y  -BS Side: Right  -BS Location: heel  -BS Primary Wound Type: Pressure inj  -BS       Wound 03/22/23 1518 Right posterior foot    Wound - Properties Group Placement Date: 03/22/23  -DM Placement Time: 1518  -DM Side: Right  -DM Orientation: posterior  -DM Location: foot  -DM    Wound Image -- -- Images linked: 1  -DM    Retired Wound - Properties Group Placement Date: 03/22/23  -DM Placement Time: 1518  -DM Side: Right  -DM Orientation: posterior  -DM Location: foot  -DM    Retired Wound - Properties Group Date first assessed: 03/22/23  -DM Time first assessed: 1518  -DM Side: Right  -DM Location: foot  -DM       Wound 03/22/23 1526 Left gluteal    Wound - Properties Group Placement Date: 03/22/23  -DM Placement Time: 1526  -DM Side: Left  -DM Location: gluteal  -DM    Retired Wound - Properties Group Placement Date: 03/22/23  -DM Placement Time: 1526  -DM Side: Left  -DM Location: gluteal  -DM    Retired Wound - Properties Group Date first assessed: 03/22/23  -DM Time first assessed: 1526  -DM Side: Left  -DM Location: gluteal  -DM    Row Name 03/22/23 1516             Wound 03/22/23 1203 Bilateral posterior scrotum Abrasion    Wound - Properties Group Placement Date: 03/22/23  -TL Placement Time: 1203  -TL Side: Bilateral  -TL Orientation: posterior  -TL Location: scrotum  -TL Primary Wound Type: Abrasion  -TL Additional Comments: reddness and swelling  to scrotum area small area opened  -TL    Retired Wound - Properties Group Placement Date: 03/22/23  -TL Placement Time: 1203  -TL Side: Bilateral  -TL Orientation: posterior  -TL Location: scrotum  -TL Primary Wound Type: Abrasion  -TL Additional Comments: reddness and swelling to scrotum area small area opened  -TL    Retired Wound - Properties Group Date first assessed: 03/22/23  -TL Time first assessed: 1203  -TL Side: Bilateral  -TL Location: scrotum  -TL Primary Wound Type: Abrasion  -TL Additional Comments: reddness and swelling to scrotum area small area opened  -TL       Wound 02/23/23 1437 medial coccyx Pressure Injury    Wound - Properties Group Placement Date: 02/23/23  -SH Placement Time: 1437  -SH Present on Hospital Admission: Y  -SH Orientation: medial  -SH Location: coccyx  -SH Primary Wound Type: Pressure inj  -SH Additional Comments: stage 2  -SH    Retired Wound - Properties Group Placement Date: 02/23/23  -SH Placement Time: 1437  -SH Present on Hospital Admission: Y  -SH Orientation: medial  -SH Location: coccyx  -SH Primary Wound Type: Pressure inj  -SH Additional Comments: stage 2  -SH    Retired Wound - Properties Group Date first assessed: 02/23/23  -SH Time first assessed: 1437  -SH Present on Hospital Admission: Y  -SH Location: coccyx  -SH Primary Wound Type: Pressure inj  -SH Additional Comments: stage 2  -SH       Wound 02/23/23 1715 Left posterior heel    Wound - Properties Group Placement Date: 02/23/23  -BS Placement Time: 1715 -BS Side: Left  -BS Orientation: posterior  -BS Location: heel  -BS    Retired Wound - Properties Group Placement Date: 02/23/23  -BS Placement Time: 1715 -BS Side: Left  -BS Orientation: posterior  -BS Location: heel  -BS    Retired Wound - Properties Group Date first assessed: 02/23/23  -BS Time first assessed: 1715  -BS Side: Left  -BS Location: heel  -BS       Wound 02/23/23 1712 Right posterior heel Pressure Injury    Wound - Properties Group Placement  Date: 02/23/23  -BS Placement Time: 1712  -BS Present on Hospital Admission: Y  -BS Side: Right  -BS Orientation: posterior  -BS Location: heel  -BS Primary Wound Type: Pressure inj  -BS    Wound Image Images linked: 1  -DM      Retired Wound - Properties Group Placement Date: 02/23/23  -BS Placement Time: 1712 -BS Present on Hospital Admission: Y  -BS Side: Right  -BS Orientation: posterior  -BS Location: heel  -BS Primary Wound Type: Pressure inj  -BS    Retired Wound - Properties Group Date first assessed: 02/23/23  -BS Time first assessed: 1712  -BS Present on Hospital Admission: Y  -BS Side: Right  -BS Location: heel  -BS Primary Wound Type: Pressure inj  -BS       Wound 03/22/23 1518 Right posterior foot    Wound - Properties Group Placement Date: 03/22/23  -DM Placement Time: 1518  -DM Side: Right  -DM Orientation: posterior  -DM Location: foot  -DM    Retired Wound - Properties Group Placement Date: 03/22/23  -DM Placement Time: 1518  -DM Side: Right  -DM Orientation: posterior  -DM Location: foot  -DM    Retired Wound - Properties Group Date first assessed: 03/22/23  -DM Time first assessed: 1518  -DM Side: Right  -DM Location: foot  -DM          User Key  (r) = Recorded By, (t) = Taken By, (c) = Cosigned By    Initials Name Provider Type    Monik Harper RN Registered Nurse    Yudi Mireles RN Registered Nurse    Osmin Cornelius RN Registered Nurse    Jim Barlow RN Registered Nurse    Sejal Emerson RN Registered Nurse    Ledy Saul LPN Licensed Nurse    Robert Coburn III RN Registered Nurse                 Assessment & Plan    77-year-old gentleman with hypoglycemia and progression of CKD to end-stage renal disease     Active Hospital Problems:       Active Hospital Problems     Diagnosis     • **Hypoglycemia        Plan:     Hypoxic respiratory failure due to fluid overload due to end-stage renal disease  BiPAP ordered  Pulmonary  consulted    Hypotension  Likely multifactorial including hemodialysis  Dopamine as needed to keep MAP above 65  Midodrine 3 times daily  Palliative consulted    Hypoglycemia  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   - Hypoglycemia protocol ordered in ED, monitor, home DM2 meds  - Accu-Cheks before meals and at bedtime  - Healthy heart/consistent carb diet once he passes swallow test, discussed with RN  - A1c 7.4 on 2/23/23  - Nutrition consulted  - Endocrine consulted     UTI  -UA: Leuk esterase large 3+ bacteria trace protein 3+, follow cultures. May need urinary catheter changed.  -Ceftriaxone initiated in ED, continue  -WBC 6.10  -Blood culture pending     Bibasilar PNA   -CXR Bibasilar consolidations which may represent atelectasis/pneumonia, with small to moderate layering pleural effusions, greatest on the right, without significant change.   Suspected background interstitial edema changes, stable.   -Continue Rocephin  -DuoNebs and Pulmicort  -Currently on 4 L nasal cannula, continue to monitor     ESRD  Anemia of Chronic disease  -Patient dialyzes on Monday, Wednesday and Friday via right-sided tunneled catheter  -Nephrology has been consulted for hemodialysis orders  -Potassium 4.3  -Creatinine 4.21  -hgb 7.7      Bilateral heel wound  -WOCN consulted     H/O HTN  HLD  - BP 95/50  - Continue statin and midodrine     Diastolic dysfunction  Nonischemic cardiomyopathy  Aortic stenosis  Pitting edema  - Sees Dr. Rasheed outpatient  - Recently at [facility] and underwent a cardiac cath, was deemed too frail to undergo a TAVR at that time, especially with progressive renal insufficiency, follows heart failure clinic and they are still proceeding with work-up for possible TAVR in the near future.  - According to care everywhere records last 2D echo 2/1/23 showed EF 33% with moderate to severe  aortic stenosis  - Continue home diuretics, may follow nephrology recommendations  - EKG Sinus rhythm. Prolonged DC interval. Right bundle branch block. Inferior infarct, old  Nonspecific T abnormalities, lateral leads  -HS Troponin T- 209 Delta 6, trend could be associated with volume overload/need for HD  -check BNP     Urinary retention  - Continue chronic Diaz catheter to bedside drainage, present on admission  - According to care everywhere records was followed by urology at recent admission to [facility]  - Continue home Flomax  - U/A positive, follow culture     Proximal A-fib  -EKG showed NSR  -Continue home amiodarone and Eliquis  -monitor BP     COPD  Pulmonary nodule: 13mm RLL with an internal solid 3mm nodule, and a 6mm RLL  Recent COVID infection requiring hospitalization at [facility] January 2023  - Currently on 4L, titrate as appropriate      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     I discussed the patient's findings and my recommendations with patient.  03/23/23      Electronically signed by Segun Rodrigues MD, 03/23/23, 14:40 EDT.  [facility]ist [facility]

## 2023-03-23 NOTE — NURSING NOTE
[facility] Dialysis    HD completed  Pt tolerated tx ok  Albumin x 2 doses used during hd  Was able to remove 2kg  Pt access ran well

## 2023-03-23 NOTE — NURSING NOTE
Daughter in law and son at bedside,discussed with patient how he wanted to proceed with plan of care. Patient does not wish to continue on AVAPS mask and daughter in law states she had spoke with Dr. Valdez about trying a different type of NC. (airvo)   Contacted respiratory to set up airvo. Upon discussing with the patient further he stated again that he did not want to wear the AVAPS  mask. Family was okay with continuing Dobutamine infusion at this time but nothing further as far as aggressive care. Patient is alert and oriented at this time and complains of no pain.

## 2023-03-23 NOTE — PLAN OF CARE
Goal Outcome Evaluation:               Patient remains on 100% AVAPs. Dobutamine gtt. AOx4, follows commands. FC present. Pending next decisions.

## 2023-03-23 NOTE — CASE MANAGEMENT/SOCIAL WORK
Continued Stay Note   [facility]     Patient Name: Holger Ibrahim  MRN: [id number]  Today's Date: 3/23/2023    Admit Date: 3/22/2023    Plan: Return to [facility], [facility]. No precert required. Current dialysis at  M/W/F. Pt will need transportation provided by [facility] at d/c.   Discharge Plan     Row Name 03/23/23 2349       Plan    Plan Comments Barrier to D/C: IV abx, HD today, dopamine gtt.                Expected Discharge Date and Time     Expected Discharge Date Expected Discharge Time    Mar 27, 2023                   Case Management Readmission Assessment Note    Case Management Readmission Assessment (all recorded)     Readmission Interview     Row Name 03/23/23 1837             Readmission Indications    Is this hospitalization related to the prior hospital diagnosis? Yes  hypoglycemia      What was the reason you were admitted? Patient admitted both times with hypoglycemia. Previous admit, hypoglycemia resolved and endocrinology consulted to adjust meds. Patient d/c'd back to [facility]. Per facility liaison, patient was give glucagon prior to sending to ER. Liaison states facility added 4am blood sugar checks as patient was dropping at night. Once patient returns they will check blood sugar at 1am and 4am, along with normal check times.      Row Name 03/23/23 5441             Recommendation for rehospitalization    Did you speak with your physician prior to coming to the hospital No      Who recommended you return to the hospital? --  [facility] staff      Did you seek care elsewhere prior to coming to the hospital? No      Row Name 03/23/23 6478             Follow up appointment    Do you have a PCP? Yes  at facility      Did you have an appointment with PCP/specialist after hospitalization within 7 days? Yes      Did you keep your appointment? Yes  seen by facility MD on 3/16, 3/17, 3/20      Row Name 03/23/23 0533             Medications    Did you have newly prescribed medications at  discharge? Yes      Did you understand the reasons for your medications at discharge and how to take them? Yes      Did you understand the side effects of your medications? Yes      Are you taking all of you prescribed medications? Yes      Row Name 03/23/23 1333             Discharge Instructions    Did you understand your discharge instructions? Yes      Row Name 03/23/23 1333             Index discharge location/services    Where did you go upon discharge? Skilled Nursing Facility  [facility]      Row Name 03/23/23 1333             Discharge Readiness    On a scale of 1-5 (5 being well prepared), how ready were you for discharge 4                Phone communication or documentation only - no physical contact with patient or family.    CHAIM Waite[place], [place] [address]    Office: [phone]  Fax: [phone]

## 2023-03-23 NOTE — PLAN OF CARE
Goal Outcome Evaluation:           Progress: no change  Outcome Evaluation: Patient alert and oriented, patient continues on O2@5liters via nc, Patients , patient received dialysis for 1.5 hrs, per dialysis nurse no fluid taken off, patients BP up and down, BP 70/30, midodrine given, FC patent, tea color urine, Midline ELIE patent, patient turned q 2 hrs, patient did not rest well this shift.

## 2023-03-23 NOTE — PROGRESS NOTES
"[facility] PROGRESS NOTE     LOS: 1 day    Patient Care Team:  Arnulfo Monroy MD as PCP - General (Internal Medicine)  Kym Aparicio MD as Consulting Physician (Endocrinology)      Subjective     Patient was seen and examined this morning.  He was resting comfortably.  Denied any chest pain, shortness of breath, nausea or vomiting.  His blood pressure has been running low overnight  Patient receiving dialysis this morning and his blood pressure is in mid 90s during dialysis    Objective     Vital Sign Min/Max for last 24 hours  Temp:  [97.4 °F (36.3 °C)-97.9 °F (36.6 °C)] 97.7 °F (36.5 °C)  Heart Rate:  [66-76] 72  Resp:  [14-18] 15  BP: ()/() 68/32                       Flowsheet Rows    Flowsheet Row First Filed Value   Admission Height 177.8 cm (70\") Documented at 03/22/2023 0413   Admission Weight 82 kg (180 lb 12.4 oz) Documented at 03/22/2023 0413          No intake/output data recorded.  I/O last 3 completed shifts:  In: 820 [P.O.:720; IV Piggyback:100]  Out: 200 [Urine:200]    Physical Exam:  Physical Exam    General Appearance: Chronically ill-appearing  Skin: warm and dry  HEENT: oral mucosa normal, nonicteric sclera  Neck: supple, no JVD  Lungs: Few scattered rhonchi  Heart: RRR, normal S1 and S2  Abdomen: soft, nontender, nondistended  : no palpable bladder  Extremities: 2+ bilateral lower extreme edema  Neuro: normal speech and mental status       LABS:  Lab Results   Component Value Date    CALCIUM 7.9 (L) 03/23/2023    PHOS 5.6 (H) 03/23/2023     Results from last 7 days   Lab Units 03/23/23  0012 03/22/23  0618   MAGNESIUM mg/dL 1.9  --    SODIUM mmol/L 131* 134*   POTASSIUM mmol/L 4.7 4.3   CHLORIDE mmol/L 95* 98   CO2 mmol/L 23.0 26.0   BUN mg/dL 34* 33*   CREATININE mg/dL 4.73* 4.21*   GLUCOSE mg/dL 210* 196*   CALCIUM mg/dL 7.9* 7.8*   WBC 10*3/mm3 8.20 6.10   HEMOGLOBIN g/dL 8.0* 7.7*   PLATELETS 10*3/mm3 171 168   ALT (SGPT) U/L 30 17   AST (SGOT) U/L 67* 38     Lab " Results   Component Value Date    TROPONINT 169 (C) 03/22/2023     Estimated Creatinine Clearance: 15.3 mL/min (A) (by C-G formula based on SCr of 4.73 mg/dL (H)).      Brief Urine Lab Results  (Last result in the past 365 days)      Color   Clarity   Blood   Leuk Est   Nitrite   Protein   CREAT   Urine HCG        03/22/23 0626 Dark Yellow   Turbid   Large (3+)   Large (3+)   Negative   >=300 mg/dL (3+)               WEIGHTS:     Wt Readings from Last 1 Encounters:   03/23/23 0500 80.2 kg (176 lb 12.9 oz)   03/22/23 0413 82 kg (180 lb 12.4 oz)       amiodarone, 200 mg, Oral, TID  apixaban, 5 mg, Oral, Q12H  atorvastatin, 40 mg, Oral, Nightly  budesonide, 0.5 mg, Nebulization, BID - RT  bumetanide, 1 mg, Oral, Daily  cefTRIAXone, 1 g, Intravenous, Q24H  clopidogrel, 75 mg, Oral, Daily  epoetin ney/ney-epbx, 20,000 Units, Intravenous, Once  folic acid, 1 mg, Oral, Daily  ipratropium-albuterol, 3 mL, Nebulization, 4x Daily - RT  midodrine, 10 mg, Oral, TID AC  pantoprazole, 40 mg, Oral, Daily  povidone-iodine, , Topical, Daily  senna-docusate sodium, 2 tablet, Oral, BID  sodium chloride, 10 mL, Intravenous, Q12H  thiamine, 100 mg, Oral, Daily      DOPamine, 2-20 mcg/kg/min        Assessment & Plan       1.  Acute kidney injury on CKD 3B  Hemodialysis dependent.  Receiving dialysis this morning.    Edema++.  Electrolytes acceptable  2.  Chronic hypotension.  Patient is on midodrine.  Fluid removal with dialysis has been difficult due to low blood pressure  3.  Urinary retention.  Diaz cath in place  4.  Anemia of CKD and iron deficiency.  Hemoglobin 8.0 this morning  5.  Hypoglycemia.  On hypoglycemia protocol     Plan:  Patient receiving hemodialysis this morning  Fluid removal with dialysis as tolerated but has been difficulty to hypertension.  IV albumin with dialysis as tolerated  IV iron with dialysis      Emanuel Beauchamp MD  03/23/23  09:57 EDT

## 2023-03-23 NOTE — NURSING NOTE
Dr. Valdez at bedside to evaluate. Spoke with patient regarding poor prognosis. Dr. Valdez called son to discuss goals of care and update on status/prognosis.     Son requested Dr. Valdez call his wife to discuss as well. Dr. Valdez called patient's daughter in law at this time.    Family to discuss goals of care together and will update staff after that discussion.

## 2023-03-24 NOTE — CASE MANAGEMENT/SOCIAL WORK
Continued Stay Note  ROGER Calderon     Patient Name: Holger Ibrahim  MRN: [id number]  Today's Date: 3/24/2023    Admit Date: 3/22/2023    Plan: [facility] No precert required. Current dialysis at  M/W/F. Pt will need transportation provided by [facility] at d/c.   Discharge Plan     Row Name 23 0931       Plan    Final Discharge Disposition Code 41 -  in medical facility    Final Note  3/24/23                Expected Discharge Date and Time     Expected Discharge Date Expected Discharge Time    Mar 27, 2023       Phone communication or documentation only - no physical contact with patient or family.        Demetra Partida RN

## 2023-03-24 NOTE — PROGRESS NOTES
"[facility] PROGRESS NOTE     LOS: 2 days    Patient Care Team:  Arnulfo Monroy MD as PCP - General (Internal Medicine)  Kym Aparicio MD as Consulting Physician (Endocrinology)      Subjective     Patient was seen and examined this morning, he was transferred to ICU due to respite distress and hypotension.  Patient received hemodialysis yesterday.  Fluid removal has been very difficult due to low blood pressure  Patient was drowsy, confused but responds to verbal stimulus.  On dobutamine drip    Objective     Vital Sign Min/Max for last 24 hours  Temp:  [97.2 °F (36.2 °C)-99.6 °F (37.6 °C)] 98.8 °F (37.1 °C)  Heart Rate:  [65-96] 89  Resp:  [13-27] 27  BP: ()/(43-70) 81/49                       Flowsheet Rows    Flowsheet Row First Filed Value   Admission Height 177.8 cm (70\") Documented at 03/22/2023 0413   Admission Weight 82 kg (180 lb 12.4 oz) Documented at 03/22/2023 0413          No intake/output data recorded.  I/O last 3 completed shifts:  In: 1197 [P.O.:480; I.V.:717]  Out: 2250 [Urine:250; Other:2000]           LABS:  Lab Results   Component Value Date    CALCIUM 8.2 (L) 03/24/2023    PHOS 4.0 03/24/2023     Results from last 7 days   Lab Units 03/24/23  0525 03/23/23  0012 03/22/23  0618   MAGNESIUM mg/dL 1.9 1.9  --    SODIUM mmol/L 133* 131* 134*   POTASSIUM mmol/L 3.6 4.7 4.3   CHLORIDE mmol/L 95* 95* 98   CO2 mmol/L 25.0 23.0 26.0   BUN mg/dL 22 34* 33*   CREATININE mg/dL 3.19* 4.73* 4.21*   GLUCOSE mg/dL 119* 210* 196*   CALCIUM mg/dL 8.2* 7.9* 7.8*   WBC 10*3/mm3 7.40 8.20 6.10   HEMOGLOBIN g/dL 7.3* 8.0* 7.7*   PLATELETS 10*3/mm3 148 171 168   ALT (SGPT) U/L 42* 30 17   AST (SGOT) U/L 88* 67* 38     Lab Results   Component Value Date    TROPONINT 169 (C) 03/22/2023     Estimated Creatinine Clearance: 22 mL/min (A) (by C-G formula based on SCr of 3.19 mg/dL (H)).      Brief Urine Lab Results  (Last result in the past 365 days)      Color   Clarity   Blood   Leuk Est   Nitrite   " Protein   CREAT   Urine HCG        23 06 Dark Yellow   Turbid   Large (3+)   Large (3+)   Negative   >=300 mg/dL (3+)               WEIGHTS:     Wt Readings from Last 1 Encounters:   23 0500 77.9 kg (171 lb 11.8 oz)   23 1717 76.2 kg (168 lb)   23 1545 76.5 kg (168 lb 10.4 oz)   23 0500 80.2 kg (176 lb 12.9 oz)   23 0413 82 kg (180 lb 12.4 oz)       amiodarone, 200 mg, Oral, TID  apixaban, 5 mg, Oral, Q12H  atorvastatin, 40 mg, Oral, Nightly  bumetanide, 1 mg, Oral, Daily  cefTRIAXone, 1 g, Intravenous, Q24H  clopidogrel, 75 mg, Oral, Daily  epoetin ney/ney-epbx, 20,000 Units, Intravenous, Once  folic acid, 1 mg, Oral, Daily  midodrine, 10 mg, Oral, TID AC  pantoprazole, 40 mg, Oral, Daily  povidone-iodine, , Topical, Daily  sodium chloride, 10 mL, Intravenous, Q12H  thiamine, 100 mg, Oral, Daily      DOBUTamine, 2-20 mcg/kg/min, Last Rate: 17 mcg/kg/min (23 0756)        Assessment & Plan       1.  Acute kidney injury on CKD 3B  Hemodialysis dependent.  Received hemodialysis yesterday  Edema++.  Fluid removal has been difficult due to low blood pressure 2.   Hypotension.  On oral midodrine and dobutamine drip since yesterday  3.  Urinary retention.  Diaz cath in place  4.  Anemia of CKD and iron deficiency.  Hemoglobin 7.3 this morning.  Patient received IV iron with dialysis yesterday  5.  Hypoglycemia.        Plan:  Patient was seen and examined this morning.  Hemodialysis was difficult yesterday due to low blood pressure  Family had decided for hospice care.  As per report patient  this morning      Emanuel Beauchamp MD  23  10:53 EDT

## 2023-03-24 NOTE — PLAN OF CARE
Goal Outcome Evaluation:      Pt remains on Airvo 60L max and dobutamine gtt per MAR. Pt remains confused at this time. All questions and concerns addressed.

## 2023-03-24 NOTE — NURSING NOTE
Patient's family notified around 0715 of patient's status declining. They mentioned that they would be coming in to see him later this morning.     Patient passed away at 0844. Family was notified as patient was passing. JAH Yu and JESUS Reddy were present with patient and then pronounced.

## 2023-03-24 NOTE — DISCHARGE SUMMARY
"Discharge Note   Holger Ibrahim 1945 [id number] 2     Date of Admission:3/22/2023     Date of Discharge: 03/24/23     Admission Diagnosis: Hypoglycemia [E16.2]  Other hypervolemia [E87.79]     Discharge Diagnosis:     Hypoglycemia  Acute respiratory failure with hypoxia / Bilateral pleural effusions:   Acute on chronic Systolic heart failure, decompensated   Cardiogenic shock:  Severe aortic stenosis  Acute cystitis   Diabetes mellitus Type 2, not insulin-dependent  Chronic atrial fibrillation :   Peripheral vascular disease, h/o right popliteal artery stent  Anemia of renal disease  Chronic Urinary retention  COPD  History of lung nodule and right lower lobe  Bilateral heel wounds      Cause of death:  Acute hypoxemic respiratory failure secondary to acutely decompensated systolic heart failure      Consults: Pulmonology, Critical Care, Endocrinology, Nephrology, Palliative Care    Hospital Course:     77 y.o. old male patient with PMH of multiple comorbidities including CHF, aortic stenosis, ESRD on hemodialysis, diabetes, presents to the hospital with altered mental status.  Found to have blood glucose of 20 and improved with dextrose.  Apparently, patient had recurrent admissions with hypoglycemia.     Also found to have UTI, treated with ceftriaxone.  Chest x-ray with bibasilar infiltrates.  Nephrology was consulted for dialysis.  Subsequently, patient became hypotensive and needed escalating doses of dopamine.  Also became progressively hypoxic, transferred to ICU.     My partner, Dr. Valdez discussed code status with patient and family last night.  Per his note, \"Had a lengthy discussion with patient at the bedside.  Discussed about his multiorgan failure as well as end-stage heart failure.  Discussed about continuing aggressive care but involves some additional procedures as well as additional struggle with the hope of living little longer.  Also discussed about palliative route which involves less " "struggle and less pain at the cost of living less.  He understands that his heart is very weak and does not have many good options.  Opted for palliative route.  Did tell him that palliative route involves stopping dialysis and any aggressive measures and purely focus on comfort, he accepts it and understands that he will be dying soon.  RN present at the time of my conversation.     Subsequently, also spoke with son and daughter-in-law separately over the phone.  Discussed about my conversation with the patient.  Informed them that patient might not have a total understanding but did opt for palliative route.  Both son and daughter-in-law leaning towards hospice.  Wants to continue with current measures with no further escalation.  Discussed about potential need for Levophed and the need for central line.  However, daughter-in-law does not want any additional procedures and okay with not escalating vasopressors.  They will discuss tonight and get back to us with further direction.  Wants to keep him DNR/DNI for the time being.\"    Pt began further decompensating around 0700 this am.  Family was notified.  He subsequently passed away prior to being examined by me this morning.  TOD was 0844.  Family notified.          Vitals:    23 0800   BP: (!) 81/49   Pulse: 89   Resp: 27   Temp: 98.8 °F (37.1 °C)   SpO2: 96%      Disposition: Morgue     Discharged Condition:     Labs:    Results from last 7 days   Lab Units 23  0525 23  0012 23  0618   WBC 10*3/mm3 7.40 8.20 6.10   HEMOGLOBIN g/dL 7.3* 8.0* 7.7*   HEMATOCRIT % 23.1* 24.5* 24.0*   PLATELETS 10*3/mm3 148 171 168       Results from last 7 days   Lab Units 23  0525 23  0012 23  0618   SODIUM mmol/L 133* 131* 134*   POTASSIUM mmol/L 3.6 4.7 4.3   CHLORIDE mmol/L 95* 95* 98   CO2 mmol/L 25.0 23.0 26.0   BUN mg/dL 22 34* 33*   CREATININE mg/dL 3.19* 4.73* 4.21*            Spent 31 minutes on discharge.    Dalton JEROME" DO [facility]al  Critical Care  03/24/23   17:23 EDT

## 2023-03-25 LAB — BACTERIA SPEC AEROBE CULT: ABNORMAL

## 2023-03-27 LAB
BACTERIA ISLT: NORMAL
BACTERIA SPEC AEROBE CULT: NORMAL
BACTERIA SPEC AEROBE CULT: NORMAL

## 2023-04-13 NOTE — PROGRESS NOTES
Enter Query Response Below      Query Response:     Related to chronic indwelling rader catheter. Present on admission.          If applicable, please update the problem list.         Patient: Holger Corley        : 1945  Account: [id number]           Admit Date:         How to Respond to this query:       a. Click New Note     b. Answer query within the yellow box.                c. Update the Problem List, if applicable.      If you have any questions about this query contact me at: [email or web address]    JAH Rice:        Patient admitted due to hypoglycemia, UTI, bibasilar pneumonia.  Patient noted to have chronic indwelling rader cathter present on admission that may need changed.  UA noted to have leuk esterase large 3+, bacteria trace, protein 3+, cultures resulted 2023: >100,000 CFU/mL Gram Negative Bacilli.  Medications included IV rocephin.    After study, can the UTI be specified as:    >related to chronic indwelling rader cathter  >not related to chronic indwelling rader catheter  >other, please specify____________  >unable to determine    By submitting this query, we are merely seeking further clarification of documentation to accurately reflect all conditions that you are monitoring, evaluating, treating or that extend the hospitalization or utilize additional resources of care. Please utilize your independent clinical judgment when addressing the question(s) above.     This query and your response, once completed, will be entered into the legal medical record.    Sincerely,  Vandana Dc  Clinical Documentation Integrity Program

## (undated) DEVICE — PINNACLE INTRODUCER SHEATH: Brand: PINNACLE

## (undated) DEVICE — ST ACC MICROPUNCTURE STFF/CANN PLAT/TP 4F 21G 40CM

## (undated) DEVICE — MYNXGRIP 6F/7F: Brand: MYNXGRIP

## (undated) DEVICE — PTA BALLOON DILATATION CATHETER: Brand: MUSTANG™

## (undated) DEVICE — MICRO TIP WIPE: Brand: DEVON

## (undated) DEVICE — BOWL PLSTC MD 16OZ BLU STRL

## (undated) DEVICE — DEV INFL COMPAK W/ACCESSPLUS IN4530

## (undated) DEVICE — RADIFOCUS GLIDEWIRE: Brand: GLIDEWIRE

## (undated) DEVICE — STPCK 3WY HP ROT

## (undated) DEVICE — DESTINATION PERIPHERAL GUIDING SHEATH: Brand: DESTINATION

## (undated) DEVICE — PROVE COVER: Brand: UNBRANDED

## (undated) DEVICE — NAVICROSS SUPPORT CATHETER: Brand: NAVICROSS